# Patient Record
Sex: MALE | Race: BLACK OR AFRICAN AMERICAN | NOT HISPANIC OR LATINO | Employment: OTHER | URBAN - METROPOLITAN AREA
[De-identification: names, ages, dates, MRNs, and addresses within clinical notes are randomized per-mention and may not be internally consistent; named-entity substitution may affect disease eponyms.]

---

## 2017-01-04 ENCOUNTER — TELEPHONE (OUTPATIENT)
Dept: INTERNAL MEDICINE | Facility: CLINIC | Age: 82
End: 2017-01-04

## 2017-01-04 NOTE — TELEPHONE ENCOUNTER
----- Message from Bebeto Malcolm sent at 1/4/2017 10:41 AM CST -----  Contact: Maxwell Iglesias with [facility] [phone]  Pt is on coumadin daily but pt has not has an INR in weeks. Requesting orders for pt to receive INR by home health.   Pt also needs glucometer to check blood sugar daily. Blood sugar has not been checked in about 2 to 3 weeks.     [facility] Fax: [phone]

## 2017-01-06 RX ORDER — METOPROLOL TARTRATE 25 MG/1
TABLET, FILM COATED ORAL
Qty: 270 TABLET | Refills: 2 | Status: SHIPPED | OUTPATIENT
Start: 2017-01-06 | End: 2017-10-20 | Stop reason: SDUPTHER

## 2017-01-06 RX ORDER — GLIPIZIDE 2.5 MG/1
TABLET, EXTENDED RELEASE ORAL
Qty: 30 TABLET | Refills: 3 | Status: SHIPPED | OUTPATIENT
Start: 2017-01-06 | End: 2017-03-15

## 2017-01-12 ENCOUNTER — TELEPHONE (OUTPATIENT)
Dept: INTERNAL MEDICINE | Facility: CLINIC | Age: 82
End: 2017-01-12

## 2017-01-12 NOTE — TELEPHONE ENCOUNTER
[facility] called and states that the patient needs an order for him to do PT/INR order and he needs a glucometer and all the supplies/   Fax # [phone]  [facility] # cell [phone]

## 2017-01-18 RX ORDER — INSULIN PUMP SYRINGE, 3 ML
EACH MISCELLANEOUS
Qty: 1 EACH | Refills: 0 | Status: SHIPPED | OUTPATIENT
Start: 2017-01-18 | End: 2017-11-20 | Stop reason: SDUPTHER

## 2017-01-18 RX ORDER — LANCETS
1 EACH MISCELLANEOUS DAILY
Qty: 100 EACH | Refills: 4 | Status: SHIPPED | OUTPATIENT
Start: 2017-01-18 | End: 2018-04-03 | Stop reason: SDUPTHER

## 2017-01-18 NOTE — TELEPHONE ENCOUNTER
Called the Home health nurse [facility] @ [phone].. [facility] for a return call. Called [facility] To advise of the orders advised to fax them to [phone]    Faxed the orders to the office

## 2017-01-18 NOTE — TELEPHONE ENCOUNTER
Patient should be taking Coumadin 6 mg every evening.  Please check PT/INR every 2 weeks.  Glucometer, strips and lancets have been ordered and sent to the pharmacy electronically.

## 2017-01-20 ENCOUNTER — TELEPHONE (OUTPATIENT)
Dept: INTERNAL MEDICINE | Facility: CLINIC | Age: 82
End: 2017-01-20

## 2017-01-20 NOTE — TELEPHONE ENCOUNTER
Spoke with [facility] patient needs to continue  Home Health Care for 1x per week gave verbal order according to Dr Garcia will fax over the signed copy today.

## 2017-01-26 ENCOUNTER — TELEPHONE (OUTPATIENT)
Dept: INTERNAL MEDICINE | Facility: CLINIC | Age: 82
End: 2017-01-26

## 2017-01-26 NOTE — LETTER
January 27, 2017                 [facility]  [address] [facility]  [place] [address]  Phone: [phone]  Fax: [phone] January 27, 2017     Patient: Chris Asencio    YOB: 1931           To Whom It May Concern:    Please be advised that Chris Asencio is being cared for by his daughter, Ronal Asencio.    If you have any questions or concerns, please don't hesitate to call.    Sincerely,        Arleen Garcia MD

## 2017-01-26 NOTE — TELEPHONE ENCOUNTER
----- Message from Maria Fernanda Schneider sent at 1/26/2017  9:04 AM CST -----  Contact: Shiva daughter [phone]  Patient would like to get medical advice.  Symptoms (please be specific):  Cough Cold Congestion   How long has patient had these symptoms:  Today  Pharmacy name and phone #:  [facility] [phone]  Any drug allergies:    Comments:     Also pt  needs a refill on hydrocodone-acetaminophen 7.5-325mg and would like to get a letter stating his daughter takes care of him ,Please call

## 2017-01-27 RX ORDER — AMOXICILLIN 500 MG/1
500 TABLET, FILM COATED ORAL 2 TIMES DAILY
Qty: 20 TABLET | Refills: 0 | Status: SHIPPED | OUTPATIENT
Start: 2017-01-27 | End: 2017-03-15 | Stop reason: SDUPTHER

## 2017-01-27 RX ORDER — HYDROCODONE BITARTRATE AND ACETAMINOPHEN 7.5; 325 MG/1; MG/1
1 TABLET ORAL EVERY 6 HOURS PRN
Qty: 30 TABLET | Refills: 0 | Status: SHIPPED | OUTPATIENT
Start: 2017-02-24 | End: 2017-06-16 | Stop reason: SDUPTHER

## 2017-01-27 RX ORDER — HYDROCODONE BITARTRATE AND ACETAMINOPHEN 7.5; 325 MG/1; MG/1
1 TABLET ORAL EVERY 6 HOURS PRN
Qty: 30 TABLET | Refills: 0 | Status: SHIPPED | OUTPATIENT
Start: 2017-03-24 | End: 2017-03-10 | Stop reason: SDUPTHER

## 2017-01-27 RX ORDER — HYDROCODONE BITARTRATE AND ACETAMINOPHEN 7.5; 325 MG/1; MG/1
1 TABLET ORAL EVERY 6 HOURS PRN
Qty: 30 TABLET | Refills: 0 | Status: SHIPPED | OUTPATIENT
Start: 2017-01-27 | End: 2017-03-10 | Stop reason: SDUPTHER

## 2017-01-27 NOTE — TELEPHONE ENCOUNTER
Please inform patient that prescriptions are ready for pickup.  Prescription for amoxicillin has been sent to the pharmacy electronically.

## 2017-01-30 NOTE — TELEPHONE ENCOUNTER
Called Ronal the patients daughter @  [phone] There was no answer lmom for a return call    Called the primary # on file [phone] there was no answer lmom for a return call     Called the number listed for his daughter @  lmom for a return call

## 2017-02-10 ENCOUNTER — TELEPHONE (OUTPATIENT)
Dept: INTERNAL MEDICINE | Facility: CLINIC | Age: 82
End: 2017-02-10

## 2017-02-10 NOTE — TELEPHONE ENCOUNTER
Arleen Garcia MD        8:40 AM   Note      Patient should be taking Coumadin 6 mg every evening. Please check PT/INR every 2 weeks. Glucometer, strips and lancets have been ordered and sent to the pharmacy electronically.         January 12, 2017    Called and advised [facility] per the notes this is the order for the PT/INR [facility] ([facility])

## 2017-02-10 NOTE — TELEPHONE ENCOUNTER
----- Message from Argentina Albarran sent at 2/10/2017 10:52 AM CST -----  Contact: [facility]/ Hyacinth [phone]  When is the next PT/INR due for patient's coumadin check?

## 2017-03-07 RX ORDER — WARFARIN 1 MG/1
TABLET ORAL
Qty: 30 TABLET | Refills: 3 | Status: SHIPPED | OUTPATIENT
Start: 2017-03-07 | End: 2017-08-04 | Stop reason: SDUPTHER

## 2017-03-10 ENCOUNTER — HOSPITAL ENCOUNTER (OUTPATIENT)
Dept: RADIOLOGY | Facility: HOSPITAL | Age: 82
Discharge: HOME | End: 2017-03-10
Attending: INTERNAL MEDICINE
Payer: MEDICARE

## 2017-03-10 ENCOUNTER — HOSPITAL ENCOUNTER (EMERGENCY)
Facility: OTHER | Age: 82
Discharge: HOME | End: 2017-03-10
Attending: EMERGENCY MEDICINE
Payer: COMMERCIAL

## 2017-03-10 ENCOUNTER — OFFICE VISIT (OUTPATIENT)
Dept: INTERNAL MEDICINE | Facility: CLINIC | Age: 82
End: 2017-03-10
Payer: COMMERCIAL

## 2017-03-10 VITALS
SYSTOLIC BLOOD PRESSURE: 116 MMHG | WEIGHT: 200 LBS | BODY MASS INDEX: 27.09 KG/M2 | TEMPERATURE: 99 F | DIASTOLIC BLOOD PRESSURE: 66 MMHG | HEART RATE: 86 BPM | HEIGHT: 72 IN | OXYGEN SATURATION: 96 % | RESPIRATION RATE: 18 BRPM

## 2017-03-10 VITALS
RESPIRATION RATE: 16 BRPM | HEART RATE: 103 BPM | HEIGHT: 71 IN | BODY MASS INDEX: 30.34 KG/M2 | SYSTOLIC BLOOD PRESSURE: 149 MMHG | DIASTOLIC BLOOD PRESSURE: 86 MMHG | TEMPERATURE: 99 F | WEIGHT: 216.69 LBS
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
## 2017-03-10 DIAGNOSIS — M54.9 BACK PAIN, UNSPECIFIED BACK LOCATION, UNSPECIFIED BACK PAIN LATERALITY, UNSPECIFIED CHRONICITY: ICD-10-CM

## 2017-03-10 DIAGNOSIS — I10 ESSENTIAL HYPERTENSION: ICD-10-CM

## 2017-03-10 DIAGNOSIS — E11.8 TYPE 2 DIABETES MELLITUS WITH COMPLICATION, WITHOUT LONG-TERM CURRENT USE OF INSULIN: ICD-10-CM

## 2017-03-10 DIAGNOSIS — Z23 NEED FOR VACCINATION WITH 13-POLYVALENT PNEUMOCOCCAL CONJUGATE VACCINE: ICD-10-CM

## 2017-03-10 DIAGNOSIS — I89.0 ELEPHANTIASIS NOSTRA VERRUCOSA: ICD-10-CM

## 2017-03-10 DIAGNOSIS — M54.2 NECK PAIN: ICD-10-CM

## 2017-03-10 DIAGNOSIS — I48.20 CHRONIC ATRIAL FIBRILLATION: Primary | ICD-10-CM

## 2017-03-10 DIAGNOSIS — L85.9 HYPERKERATOSIS: ICD-10-CM

## 2017-03-10 PROCEDURE — 99999 PR PBB SHADOW E&M-EST. PATIENT-LVL IV: CPT | Mod: PBBFAC,,, | Performed by: INTERNAL MEDICINE

## 2017-03-10 PROCEDURE — 72100 X-RAY EXAM L-S SPINE 2/3 VWS: CPT | Mod: 26,,, | Performed by: RADIOLOGY

## 2017-03-10 PROCEDURE — 25000003 PHARM REV CODE 250: Performed by: EMERGENCY MEDICINE

## 2017-03-10 PROCEDURE — 99214 OFFICE O/P EST MOD 30 MIN: CPT | Mod: S$GLB,,, | Performed by: INTERNAL MEDICINE

## 2017-03-10 PROCEDURE — 72040 X-RAY EXAM NECK SPINE 2-3 VW: CPT | Mod: 26,,, | Performed by: RADIOLOGY

## 2017-03-10 PROCEDURE — 99284 EMERGENCY DEPT VISIT MOD MDM: CPT | Mod: 25

## 2017-03-10 RX ORDER — HYDROCODONE BITARTRATE AND ACETAMINOPHEN 7.5; 325 MG/1; MG/1
1 TABLET ORAL EVERY 6 HOURS PRN
Qty: 30 TABLET | Refills: 0 | Status: SHIPPED | OUTPATIENT
Start: 2017-05-19 | End: 2017-06-16 | Stop reason: SDUPTHER

## 2017-03-10 RX ORDER — ACETAMINOPHEN 325 MG/1
650 TABLET ORAL
Status: COMPLETED | OUTPATIENT
Start: 2017-03-10 | End: 2017-03-10

## 2017-03-10 RX ORDER — HYDROCODONE BITARTRATE AND ACETAMINOPHEN 7.5; 325 MG/1; MG/1
1 TABLET ORAL EVERY 6 HOURS PRN
Qty: 30 TABLET | Refills: 0 | Status: SHIPPED | OUTPATIENT
Start: 2017-04-21 | End: 2017-06-16 | Stop reason: SDUPTHER

## 2017-03-10 RX ORDER — TRIAMCINOLONE ACETONIDE 1 MG/G
CREAM TOPICAL 2 TIMES DAILY
Qty: 400 G | Refills: 2 | Status: SHIPPED | OUTPATIENT
Start: 2017-03-10 | End: 2017-10-20 | Stop reason: SDUPTHER

## 2017-03-10 RX ADMIN — ACETAMINOPHEN 650 MG: 325 TABLET ORAL at 03:03
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
## 2017-03-10 NOTE — DISCHARGE INSTRUCTIONS
Back Care Tips    Caring for your back  These are things you can do to prevent a recurrence of acute back pain and to reduce symptoms from chronic back pain:  · Maintain a healthy weight. If you are overweight, losing weight will help most types of back pain.  · Exercise is an important part of recovery from most types of back pain. The muscles behind and in front of the spine support the back. This means strengthening both the back muscles and the abdominal muscles will provide better support for your spine.   · Swimming and brisk walking are good overall exercises to improve your fitness level.  · Practice safe lifting methods (below).  · Practice good posture when sitting, standing and walking. Avoid prolonged sitting. This puts more stress on the lower back than standing or walking.  · Wear quality shoes with sufficient arch support. Foot and ankle alignment can affect back symptoms. Women should avoid wearing high heels.  · Therapeutic massage can help relax the back muscles without stretching them.  · During the first 24 to 72 hours after an acute injury or flare-up of chronic back pain, apply an ice pack to the painful area for 20 minutes and then remove it for 20 minutes, over a period of 60 to 90 minutes, or several times a day. As a safety precaution, do not use a heating pad at bedtime. Sleeping on a heating pad can lead to skin burns or tissue damage.  · You can alternate ice and heat therapies.  Medications  Talk to your healthcare provider before using medicines, especially if you have other medical problems or are taking other medicines.  · You may use acetaminophen or ibuprofen to control pain, unless your healthcare provider prescribed other pain medicine. If you have chronic conditions like diabetes, liver or kidney disease, stomach ulcers, or gastrointestinal bleeding, or are taking blood thinners, talk with your healthcare provider before taking any medicines.  · Be careful if you are given  prescription pain medicines, narcotics, or medicine for muscle spasm. They can cause drowsiness, affect your coordination, reflexes, and judgment. Do not drive or operate heavy machinery while taking these types of medicines. Take prescription pain medicine only as prescribed by your healthcare provider.  Lumbar stretch  Here is a simple stretching exercise that will help relax muscle spasm and keep your back more limber. If exercise makes your back pain worse, dont do it.  · Lie on your back with your knees bent and both feet on the ground.  · Slowly raise your left knee to your chest as you flatten your lower back against the floor. Hold for 5 seconds.  · Relax and repeat the exercise with your right knee.  · Do 10 of these exercises for each leg.  Safe lifting method  · Dont bend over at the waist to lift an object off the floor.  Instead, bend your knees and hips in a squat.   · Keep your back and head upright  · Hold the object close to your body, directly in front of you.  · Straighten your legs to lift the object.   · Lower the object to the floor in the reverse fashion.  · If you must slide something across the floor, push it.  Posture tips  Sitting  Sit in chairs with straight backs or low-back support. Keep your knees lower than your hips, with your feet flat on the floor.  When driving, sit up straight. Adjust the seat forward so you are not leaning toward the steering wheel.  A small pillow or rolled towel behind your lower back may help if you are driving long distances.   Standing  When standing for long periods, shift most of your weight to one leg at a time. Alternate legs every few minutes.   Sleeping  The best way to sleep is on your side with your knees bent. Put a low pillow under your head to support your neck in a neutral spine position. Avoid thick pillows that bend your neck to one side. Put a pillow between your legs to further relax your lower back. If you sleep on your back, put pillows  under your knees to support your legs in a slightly flexed position. Use a firm mattress. If your mattress sags, replace it, or use a 1/2-inch plywood board under the mattress to add support.  Follow-up care  Follow up with your healthcare provider, or as advised.  If X-rays, a CT scan or an MRI scan were taken, they will be reviewed by a radiologist. You will be notified of any new findings that may affect your care.  Call 911  Seek emergency medical care if any of the following occur:  · Trouble breathing  · Confusion  · Very drowsy  · Fainting or loss of consciousness  · Rapid or very slow heart rate  · Loss of  bowel or bladder control  When to seek medical care  Call your healthcare provider if any of the following occur:  · Pain becomes worse or spreads to your arms or legs  · Weakness or numbness in one or both arms or legs  · Numbness in the groin area  Date Last Reviewed: 6/1/2016  © 3735-9925 [employer]. [address] [place] [address], [place] [address]. All rights reserved. This information is not intended as a substitute for professional medical care. Always follow your healthcare professional's instructions.

## 2017-03-10 NOTE — MR AVS SNAPSHOT
[place] [facility]   [facility]  [place] [address]  Phone: [phone]  Fax: [phone]                  Chris Asencio   3/10/2017 10:00 AM   Office Visit    Description:  Male : 1931   Provider:  Arleen Garcia MD   Department:  [place] [facility]           Reason for Visit     Annual Exam           Diagnoses this Visit        Comments    Chronic atrial fibrillation    -  Primary     Hyperkeratosis         Elephantiasis nostra verrucosa         Type 2 diabetes mellitus with complication, without long-term current use of insulin         Back pain, unspecified back location, unspecified back pain laterality, unspecified chronicity         Essential hypertension         Need for vaccination with 13-polyvalent pneumococcal conjugate vaccine         Neck pain                To Do List           Goals (5 Years of Data)     None      Follow-Up and Disposition     Return in about 3 months (around 6/10/2017).       These Medications        Disp Refills Start End    triamcinolone acetonide 0.1% (KENALOG) 0.1 % cream 400 g 2 3/10/2017     Apply topically 2 (two) times daily. Apply to affected area twice daily as directed. - Topical (Top)    Pharmacy: [id number] - [address] Ph #: [phone]       hydrocodone-acetaminophen 7.5-325mg (NORCO) 7.5-325 mg per tablet 30 tablet 0 2017     Take 1 tablet by mouth every 6 (six) hours as needed for Pain. - Oral    Pharmacy: [id number] - [address] Ph #: [phone]       hydrocodone-acetaminophen 7.5-325mg (NORCO) 7.5-325 mg per tablet 30 tablet 0 2017     Take 1 tablet by mouth every 6 (six) hours as needed for Pain. - Oral    [email or web address] [address] [facility] [address] Ph #: [phone]         [facility]     [facility] Care [facility] -  Assistance  Registered nurses in  the [facility] provide clinical advisement, health education, appointment booking, and other advisory services.  Call for this free service at [phone].             Medications           Message regarding Medications     Verify the changes and/or additions to your medication regime listed below are the same as discussed with your clinician today.  If any of these changes or additions are incorrect, please notify your healthcare provider.        CHANGE how you are taking these medications     Start Taking Instead of    triamcinolone acetonide 0.1% (KENALOG) 0.1 % cream triamcinolone acetonide 0.1% (KENALOG) 0.1 % cream    Dosage:  Apply topically 2 (two) times daily. Apply to affected area twice daily as directed. Dosage:  Apply topically 2 (two) times daily. Apply to affected area twice daily as directed.    Reason for Change:  Reorder            Verify that the below list of medications is an accurate representation of the medications you are currently taking.  If none reported, the list may be blank. If incorrect, please contact your healthcare provider. Carry this list with you in case of emergency.           Current Medications     acitretin (SORIATANE) 25 MG capsule Take 1 capsule (25 mg total) by mouth once daily.    ammonium lactate (LAC-HYDRIN) 12 % lotion APPLY TOPICALLY ONCE DAILY TO BODY, ARMS, AND LEGS    ammonium lactate 12 % Crea Apply 1 application topically 2 (two) times daily. Apply to affected area    blood sugar diagnostic Strp 1 strip by Misc.(Non-Drug; Combo Route) route once daily.    blood-glucose meter kit Use as instructed    econazole nitrate 1 % cream AAA bid to feet    fluticasone (FLONASE) 50 mcg/actuation nasal spray 1 spray by Each Nare route once daily.    gentamicin (GARAMYCIN) 0.1 % ointment     glipiZIDE (GLUCOTROL) 2.5 MG TR24 take 1 tablet by mouth once daily    hydrocodone-acetaminophen 7.5-325mg (NORCO) 7.5-325 mg per tablet Take 1 tablet by mouth every 6  "(six) hours as needed for Pain.    hydrocodone-acetaminophen 7.5-325mg (NORCO) 7.5-325 mg per tablet Starting on Apr 21, 2017. Take 1 tablet by mouth every 6 (six) hours as needed for Pain.    hydrocodone-acetaminophen 7.5-325mg (NORCO) 7.5-325 mg per tablet Starting on May 19, 2017. Take 1 tablet by mouth every 6 (six) hours as needed for Pain.    lancets Misc 1 Units by Misc.(Non-Drug; Combo Route) route once daily.    metoprolol tartrate (LOPRESSOR) 25 MG tablet Take 1 tablet (25 mg total) by mouth 2 (two) times daily.    metoprolol tartrate (LOPRESSOR) 25 MG tablet take 1 tablet by mouth three times a day    metoprolol tartrate (LOPRESSOR) 25 MG tablet take 1 tablet by mouth three times a day    triamcinolone acetonide 0.1% (KENALOG) 0.1 % cream apply to affected area twice a day    triamcinolone acetonide 0.1% (KENALOG) 0.1 % cream Apply topically 2 (two) times daily. Apply to affected area twice daily as directed.    warfarin (COUMADIN) 1 MG tablet take 1 tablet by mouth once daily    warfarin (COUMADIN) 5 MG tablet take 1 tablet by mouth once daily           Clinical Reference Information           Your Vitals Were     BP Pulse Temp Resp    149/86 (BP Location: Right arm, Patient Position: Sitting, BP Method: Manual) 103 99.3 °F (37.4 °C) (Oral) 16    Height Weight BMI    5' 11" (1.803 m) 98.3 kg (216 lb 11.4 oz) 30.23 kg/m2      Blood Pressure          Most Recent Value    BP  (!)  149/86      Allergies as of 3/10/2017     No Known Allergies      Immunizations Administered on Date of Encounter - 3/10/2017     Name Date Dose VIS Date Route    Pneumococcal Conjugate - 13 Valent  Incomplete 0.5 mL 11/5/2015 Intramuscular      Orders Placed During Today's Visit      Normal Orders This Visit    Pneumococcal Conjugate Vaccine (13 Valent) (IM)     Future Labs/Procedures Expected by Expires    CBC auto differential  3/10/2017 5/9/2018    Comprehensive metabolic panel  3/10/2017 5/9/2018    Hemoglobin A1c  3/10/2017 " 3/10/2018    TSH  3/10/2017 5/9/2018    X-Ray Cervical Spine AP And Lateral  3/10/2017 3/10/2018    X-Ray Lumbar Spine Ap And Lateral  3/10/2017 3/10/2018      [facility] Sign-Up     Activating your [facility] account is as easy as 1-2-3!     1) Visit [email or web address], select Sign Up Now, enter this activation code and your date of birth, then select Next.  [email or web address]  Expires: 4/24/2017 11:00 AM      2) Create a username and password to use when you visit [facility] in the future and select a security question in case you lose your password and select Next.    3) Enter your e-mail address and click Sign Up!    Additional Information  If you have questions, please e-mail [email or web address] or call [phone] to talk to our [facility] staff. Remember, [facility] is NOT to be used for urgent needs. For medical emergencies, dial 911.         Language Assistance Services     ATTENTION: Language assistance services are available, free of charge. Please call [phone].      ATENCIÓN: Si habla español, tiene a lopez disposición servicios gratuitos de asistencia lingüística. Llame al [phone].     CHÚ Ý: N?u b?n nói Ti?ng Vi?t, có các d?ch v? h? tr? ngôn ng? mi?n phí dành cho b?n. G?i s? [phone].         [facility] complies with applicable Federal civil rights laws and does not discriminate on the basis of race, color, national origin, age, disability, or sex.

## 2017-03-10 NOTE — PROGRESS NOTES
CC: Annual review of chronic medical problems  HPI:  The patient is a 85 y.o. old male with atrial fibrillation and type 2 diabetes mellitus with complication who presents to the office for annual review of chronic medical problems.  The patient has not taken blood pressure medication yet today.    PAST MEDICAL HISTORY  Past Medical History:   Diagnosis Date    *Atrial fibrillation     Diabetes mellitus type II     Glaucoma     Hyperlipidemia     Hypertension        SURGICAL HISTORY:  Past Surgical History:   Procedure Laterality Date    CHOLECYSTECTOMY           MEDS:  Medcard reviewed and updated    ALLERGIES: Allergy Card reviewed and updated    SOCIAL HISTORY:   The patient is a nonsmoker, denies alcohol or illicit drug use.    ROS:  GENERAL: No fever, chills, fatigability or weight loss.  SKIN: Positive rash.  HEAD: No headaches or recent head trauma.  EYES: Blind.  EARS: Denies ear pain, discharge or vertigo.  NOSE: No epistaxis.  Positive postnasal drip.  MOUTH & THROAT: No hoarseness or change in voice.   NODES: Denies swollen glands.  CHEST: Positive shortness of breath occasionally.  Denies wheezing, cough and sputum production.  CARDIOVASCULAR: Denies chest pain or palpitations.  ABDOMEN: Appetite fine. Denies diarrhea, abdominal pain or constipation.  URINARY: No dysuria.  MUSCULOSKELETAL: Positive leg pain and swelling. Positive back pain.  NEUROLOGIC: No history of seizures.  ENDOCRINE: Denies polyuria or polydipsia.  PSYCHIATRIC: Denies mood swings, depression, anxiety, homicidal or suicidal thoughts.    SCREENINGS:  Last cholesterol: 2015  Last colonoscopy: several years ago  Last tetanus: unknown  Last Pneumovax: none  Last eye exam: none  Last PSA: unknown    PE:   Vitals:  Vitals:    03/10/17 0951   BP: (!) 149/86   Pulse: 103   Resp: 16   Temp: 99.3 °F (37.4 °C)       APPEARANCE: Well nourished, well developed, in no acute distress.    NECK: Pain with extension.  CHEST: Lungs clear to  auscultation with unlabored respirations.  CARDIOVASCULAR: Irregularly, irregular S1, S2. No murmurs. No carotid bruits. Bilateral pedal edema.  ABDOMEN: Bowel sounds normal. Not distended. Soft. No tenderness or masses. No organomegaly.  MUSCULOSKELETAL:  Normal gait   SKIN: Dry, scaling skin of bilateral legs.  PSYCHIATRIC: The patient is oriented to person, place, and time and has a pleasant affect.        ASSESSMENT/PLAN:  Chris was seen today for annual exam.    Diagnoses and all orders for this visit:    Chronic atrial fibrillation  -     Comprehensive metabolic panel; Future  -     TSH; Future  -     Continue Coumadin therapy  -     PT/INR; Future    Hyperkeratosis  -     triamcinolone acetonide 0.1% (KENALOG) 0.1 % cream; Apply topically 2 (two) times daily. Apply to affected area twice daily as directed.    Elephantiasis nostra verrucosa  -     triamcinolone acetonide 0.1% (KENALOG) 0.1 % cream; Apply topically 2 (two) times daily. Apply to affected area twice daily as directed.    Type 2 diabetes mellitus with complication, without long-term current use of insulin  -     Comprehensive metabolic panel; Future  -     Hemoglobin A1c; Future    Back pain, unspecified back location, unspecified back pain laterality, unspecified chronicity  -     X-Ray Lumbar Spine Ap And Lateral; Future    Essential hypertension  -     TSH; Future  -     CBC auto differential; Future  -     Blood pressure is mildly elevated, resume antihypertensive    Need for vaccination with 13-polyvalent pneumococcal conjugate vaccine  -     Pneumococcal Conjugate Vaccine (13 Valent) (IM)    Neck pain  -     X-Ray Cervical Spine AP And Lateral; Future    Other orders  -     hydrocodone-acetaminophen 7.5-325mg (NORCO) 7.5-325 mg per tablet; Take 1 tablet by mouth every 6 (six) hours as needed for Pain.  -     hydrocodone-acetaminophen 7.5-325mg (NORCO) 7.5-325 mg per tablet; Take 1 tablet by mouth every 6 (six) hours as needed for  Pain.
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
## 2017-03-15 ENCOUNTER — TELEPHONE (OUTPATIENT)
Dept: INTERNAL MEDICINE | Facility: CLINIC | Age: 82
End: 2017-03-15

## 2017-03-15 RX ORDER — AMOXICILLIN 500 MG/1
500 TABLET, FILM COATED ORAL 2 TIMES DAILY
Qty: 20 TABLET | Refills: 0 | Status: SHIPPED | OUTPATIENT
Start: 2017-03-15 | End: 2017-03-25

## 2017-03-15 NOTE — TELEPHONE ENCOUNTER
Please inform patient's daughter that we can authorize physical therapy at home.  He may need to be reevaluated if he is having significant pain.  Please advise and schedule follow-up.  Also, labs are good.  Diabetes is very well controlled.  Hemoglobin A1c is low at 4.7.  Recommend discontinuing glipizide to decrease the risk of low blood sugars.  Also, x-rays do show curvature of the spine.  This can contribute to back and neck pain.  Also, INR is normal.  Please advise if patient has been taking Coumadin as prescribed.

## 2017-03-15 NOTE — TELEPHONE ENCOUNTER
Patient's daughter  Said  She did not request the Norco  Only need  Amoxil patient has a runny nose  (clear  Mucus) and a cough
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
## 2017-03-15 NOTE — TELEPHONE ENCOUNTER
----- Message from Sheryl Oliveira MA sent at 3/15/2017 11:08 AM CDT -----  Contact: Ronal/Zvbwwyky-[phone]  Type: Rx    Name of medication(s): amoxicillin (AMOXIL) 500 MG Tab and hydrocodone-acetaminophen 7.5-325mg (NORCO) 7.5-325 mg per tablet    Is this a refill? New rx? Refill    Who prescribed medication?    Pharmacy Name, Phone, & Location: [facility] [place] - [address]    Comments: Please advise. Thanks!

## 2017-03-16 NOTE — TELEPHONE ENCOUNTER
daughter  Is concern about patient leg their is some swelling bur she think it because he sitting  up .

## 2017-03-16 NOTE — TELEPHONE ENCOUNTER
Advise patient to keep his legs elevated when seated.  Please advise if patient is experiencing shortness of breath.

## 2017-03-17 NOTE — TELEPHONE ENCOUNTER
Spoke to patient daughter and gave her  Doctor instruction .  Patient is not experience any shortness  Of breath

## 2017-03-23 ENCOUNTER — TELEPHONE (OUTPATIENT)
Dept: INTERNAL MEDICINE | Facility: CLINIC | Age: 82
End: 2017-03-23

## 2017-03-23 NOTE — TELEPHONE ENCOUNTER
----- Message from Katherin Christianson sent at 3/23/2017 10:07 AM CDT -----  Contact: [facility] [phone]  Will like orders to continue home health care services for the pt faxed to [phone],please advise

## 2017-05-08 RX ORDER — WARFARIN SODIUM 5 MG/1
TABLET ORAL
Qty: 30 TABLET | Refills: 3 | Status: SHIPPED | OUTPATIENT
Start: 2017-05-08 | End: 2017-09-03 | Stop reason: SDUPTHER

## 2017-06-05 RX ORDER — GLIPIZIDE 2.5 MG/1
TABLET, EXTENDED RELEASE ORAL
Qty: 30 TABLET | Refills: 3 | Status: SHIPPED | OUTPATIENT
Start: 2017-06-05 | End: 2017-10-03 | Stop reason: SDUPTHER

## 2017-06-15 ENCOUNTER — TELEPHONE (OUTPATIENT)
Dept: INTERNAL MEDICINE | Facility: CLINIC | Age: 82
End: 2017-06-15

## 2017-06-15 NOTE — TELEPHONE ENCOUNTER
----- Message from Taylor Bass sent at 6/15/2017  1:28 PM CDT -----  Contact: Ronal, phone [phone]  Ronal says Mr. Asencio will not have a ride until after 10:30 tomorrow morning. He is scheduled for 9:40.  She would like to know if you can get him in later tomorrow.  Please give her a call.    Thanks!

## 2017-06-16 ENCOUNTER — OFFICE VISIT (OUTPATIENT)
Dept: INTERNAL MEDICINE | Facility: CLINIC | Age: 82
End: 2017-06-16
Payer: MEDICARE

## 2017-06-16 VITALS
DIASTOLIC BLOOD PRESSURE: 60 MMHG | TEMPERATURE: 99 F | HEIGHT: 72 IN | HEART RATE: 73 BPM | WEIGHT: 199.94 LBS | BODY MASS INDEX: 27.08 KG/M2 | SYSTOLIC BLOOD PRESSURE: 142 MMHG

## 2017-06-16 DIAGNOSIS — I89.0 ELEPHANTIASIS NOSTRA VERRUCOSA: ICD-10-CM

## 2017-06-16 DIAGNOSIS — L85.9 HYPERKERATOSIS: ICD-10-CM

## 2017-06-16 DIAGNOSIS — E11.8 TYPE 2 DIABETES MELLITUS WITH COMPLICATION, WITHOUT LONG-TERM CURRENT USE OF INSULIN: Primary | ICD-10-CM

## 2017-06-16 PROCEDURE — 99999 PR PBB SHADOW E&M-EST. PATIENT-LVL III: CPT | Mod: PBBFAC,,, | Performed by: INTERNAL MEDICINE

## 2017-06-16 PROCEDURE — 99213 OFFICE O/P EST LOW 20 MIN: CPT | Mod: PBBFAC,PO | Performed by: INTERNAL MEDICINE

## 2017-06-16 PROCEDURE — 99213 OFFICE O/P EST LOW 20 MIN: CPT | Mod: S$PBB,,, | Performed by: INTERNAL MEDICINE

## 2017-06-16 PROCEDURE — 1159F MED LIST DOCD IN RCRD: CPT | Mod: ,,, | Performed by: INTERNAL MEDICINE

## 2017-06-16 RX ORDER — TRIAMCINOLONE ACETONIDE 1 MG/G
CREAM TOPICAL
Qty: 454 G | Refills: 3 | Status: SHIPPED | OUTPATIENT
Start: 2017-06-16 | End: 2017-10-04 | Stop reason: SDUPTHER

## 2017-06-16 RX ORDER — HYDROCODONE BITARTRATE AND ACETAMINOPHEN 7.5; 325 MG/1; MG/1
1 TABLET ORAL EVERY 6 HOURS PRN
Qty: 30 TABLET | Refills: 0 | Status: SHIPPED | OUTPATIENT
Start: 2017-08-11 | End: 2017-09-28 | Stop reason: SDUPTHER

## 2017-06-16 RX ORDER — HYDROCODONE BITARTRATE AND ACETAMINOPHEN 7.5; 325 MG/1; MG/1
1 TABLET ORAL EVERY 6 HOURS PRN
Qty: 30 TABLET | Refills: 0 | Status: SHIPPED | OUTPATIENT
Start: 2017-06-16 | End: 2017-10-20 | Stop reason: SDUPTHER

## 2017-06-16 RX ORDER — HYDROCODONE BITARTRATE AND ACETAMINOPHEN 7.5; 325 MG/1; MG/1
1 TABLET ORAL EVERY 6 HOURS PRN
Qty: 30 TABLET | Refills: 0 | Status: SHIPPED | OUTPATIENT
Start: 2017-07-14 | End: 2017-10-20 | Stop reason: SDUPTHER

## 2017-06-16 NOTE — PROGRESS NOTES
CC: followup of dermatitis  HPI:  The patient is a 86 y.o. year old male who presents to the office for followup of dermatitis.  His daughter reports worsening of dermatitis of leg.  The patient denies any chest pain, shortness of breath, headache, excessive fatigue, nausea or vomiting.  He complains of stiffness.    PAST MEDICAL HISTORY:  Past Medical History:   Diagnosis Date    *Atrial fibrillation     Diabetes mellitus type II     Glaucoma     Hyperlipidemia     Hypertension     Kyphosis        SURGICAL HISTORY:  Past Surgical History:   Procedure Laterality Date    CHOLECYSTECTOMY         MEDS:  Medcard reviewed and updated    ALLERGIES: Allergy Card reviewed and updated    SOCIAL HISTORY:   The patient is a nonsmoker.    PE:   APPEARANCE: Well nourished, well developed, in no acute distress.    CHEST: Lungs clear to auscultation with unlabored respirations.  CARDIOVASCULAR: Irregularly irregular S1, S2. No murmurs. No carotid bruits.  ABDOMEN: Bowel sounds normal. Not distended. Soft. No tenderness or masses.   SKIN: Positive hyperpigmentation of bilateral lower extremities, left greater than right.  Left leg with areas of crusted drainage.  PSYCHIATRIC: The patient is oriented to person, place, and time and has a pleasant affect.        ASSESSMENT/PLAN:  Chris was seen today for follow-up.    Diagnoses and all orders for this visit:    Type 2 diabetes mellitus with complication, without long-term current use of insulin  -     Good glycemic control    Elephantiasis nostra verrucosa  -     triamcinolone acetonide 0.1% (KENALOG) 0.1 % cream; apply to affected area twice a day    Hyperkeratosis    Other orders  -     hydrocodone-acetaminophen 7.5-325mg (NORCO) 7.5-325 mg per tablet; Take 1 tablet by mouth every 6 (six) hours as needed for Pain.  -     hydrocodone-acetaminophen 7.5-325mg (NORCO) 7.5-325 mg per tablet; Take 1 tablet by mouth every 6 (six) hours as needed for Pain.  -      hydrocodone-acetaminophen 7.5-325mg (NORCO) 7.5-325 mg per tablet; Take 1 tablet by mouth every 6 (six) hours as needed for Pain.

## 2017-08-04 RX ORDER — WARFARIN 1 MG/1
TABLET ORAL
Qty: 30 TABLET | Refills: 3 | Status: SHIPPED | OUTPATIENT
Start: 2017-08-04 | End: 2018-01-25 | Stop reason: SDUPTHER

## 2017-09-04 RX ORDER — WARFARIN SODIUM 5 MG/1
TABLET ORAL
Qty: 30 TABLET | Refills: 3 | Status: SHIPPED | OUTPATIENT
Start: 2017-09-04 | End: 2018-03-05 | Stop reason: SDUPTHER

## 2017-09-13 ENCOUNTER — TELEPHONE (OUTPATIENT)
Dept: INTERNAL MEDICINE | Facility: CLINIC | Age: 82
End: 2017-09-13

## 2017-09-13 NOTE — TELEPHONE ENCOUNTER
----- Message from Jerri Velasquez sent at 9/12/2017  1:59 PM CDT -----  Contact: [facility] Hyacinth [phone] ext [phone]  Hyacinth with [facility] would like a call back from the nurse/ regarding his coumidin. They state they were unable to get in to the apartment and would like to know if Dr Garcia would like them to try again to get to the pt.

## 2017-09-13 NOTE — TELEPHONE ENCOUNTER
They would like to get and order for pt inr for this week because the missed last week  Need to also get a order for and aid because patient is not getting bath  And possibles social work to eval his current living condition

## 2017-09-20 ENCOUNTER — TELEPHONE (OUTPATIENT)
Dept: INTERNAL MEDICINE | Facility: CLINIC | Age: 82
End: 2017-09-20

## 2017-09-20 NOTE — TELEPHONE ENCOUNTER
----- Message from Jerri Velasquez sent at 9/20/2017  3:58 PM CDT -----  Contact: [facility] Rafia [phone]  Rafia states she was supposed to collect PT INR but there was no answer so she will try and collect that tomorrow 9/21

## 2017-09-22 ENCOUNTER — HOSPITAL ENCOUNTER (EMERGENCY)
Facility: HOSPITAL | Age: 82
Discharge: HOME | End: 2017-09-22
Attending: EMERGENCY MEDICINE
Payer: MEDICARE

## 2017-09-22 ENCOUNTER — TELEPHONE (OUTPATIENT)
Dept: INTERNAL MEDICINE | Facility: CLINIC | Age: 82
End: 2017-09-22

## 2017-09-22 VITALS
DIASTOLIC BLOOD PRESSURE: 72 MMHG | HEIGHT: 73 IN | WEIGHT: 200 LBS | HEART RATE: 94 BPM | TEMPERATURE: 99 F | RESPIRATION RATE: 20 BRPM | OXYGEN SATURATION: 98 % | BODY MASS INDEX: 26.51 KG/M2 | SYSTOLIC BLOOD PRESSURE: 148 MMHG
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
## 2017-09-22 DIAGNOSIS — Z04.3: ICD-10-CM
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
## 2017-09-22 LAB
ALBUMIN SERPL BCP-MCNC: 2.5 G/DL
ALP SERPL-CCNC: 90 U/L
ALT SERPL W/O P-5'-P-CCNC: 13 U/L
ANION GAP SERPL CALC-SCNC: 9 MMOL/L
AST SERPL-CCNC: 28 U/L
BASOPHILS # BLD AUTO: 0.03 K/UL
BASOPHILS NFR BLD: 0.5 %
BILIRUB SERPL-MCNC: 1.6 MG/DL
BUN SERPL-MCNC: 14 MG/DL
CALCIUM SERPL-MCNC: 7.9 MG/DL
CHLORIDE SERPL-SCNC: 110 MMOL/L
CO2 SERPL-SCNC: 22 MMOL/L
CREAT SERPL-MCNC: 0.8 MG/DL
DIFFERENTIAL METHOD: ABNORMAL
EOSINOPHIL # BLD AUTO: 0 K/UL
EOSINOPHIL NFR BLD: 0.6 %
ERYTHROCYTE [DISTWIDTH] IN BLOOD BY AUTOMATED COUNT: 14.9 %
EST. GFR  (AFRICAN AMERICAN): >60 ML/MIN/1.73 M^2
EST. GFR  (NON AFRICAN AMERICAN): >60 ML/MIN/1.73 M^2
GLUCOSE SERPL-MCNC: 93 MG/DL
HCT VFR BLD AUTO: 31 %
HGB BLD-MCNC: 10.9 G/DL
LYMPHOCYTES # BLD AUTO: 1.1 K/UL
LYMPHOCYTES NFR BLD: 17.6 %
MCH RBC QN AUTO: 26.7 PG
MCHC RBC AUTO-ENTMCNC: 35.2 G/DL
MCV RBC AUTO: 76 FL
MONOCYTES # BLD AUTO: 0.6 K/UL
MONOCYTES NFR BLD: 8.8 %
NEUTROPHILS # BLD AUTO: 4.7 K/UL
NEUTROPHILS NFR BLD: 72 %
PLATELET # BLD AUTO: 128 K/UL
PMV BLD AUTO: 10 FL
POTASSIUM SERPL-SCNC: 3.6 MMOL/L
PROT SERPL-MCNC: 6.9 G/DL
RBC # BLD AUTO: 4.09 M/UL
SODIUM SERPL-SCNC: 141 MMOL/L
WBC # BLD AUTO: 6.48 K/UL

## 2017-09-22 PROCEDURE — 93010 ELECTROCARDIOGRAM REPORT: CPT | Mod: ,,, | Performed by: INTERNAL MEDICINE

## 2017-09-22 PROCEDURE — 85025 COMPLETE CBC W/AUTO DIFF WBC: CPT

## 2017-09-22 PROCEDURE — 80053 COMPREHEN METABOLIC PANEL: CPT

## 2017-09-22 PROCEDURE — 93005 ELECTROCARDIOGRAM TRACING: CPT

## 2017-09-22 PROCEDURE — 99283 EMERGENCY DEPT VISIT LOW MDM: CPT | Mod: ,,, | Performed by: EMERGENCY MEDICINE

## 2017-09-22 PROCEDURE — 99285 EMERGENCY DEPT VISIT HI MDM: CPT | Mod: 25

## 2017-09-22 RX ORDER — MELOXICAM 15 MG/1
7.5 TABLET ORAL DAILY
Qty: 20 TABLET | Refills: 0 | Status: SHIPPED | OUTPATIENT
Start: 2017-09-22 | End: 2018-08-18

## 2017-09-23 NOTE — ED NOTES
Supplies and education for urine specimen collection provided to patient. Patient verbalized understanding of procedure and agrees to call if assistance is needed.  Patient ambulatory to restroom with caregiver. Pt agrees to call for assistance if needed.  Safety Check  Bed locked and low, call bell within reach, side rails up X 2. Necessary monitoring equipment remains properly attached. Belongings remain within patients reach. Patient denies any needs at this time, advised and agrees to call with any needs or changes.

## 2017-09-23 NOTE — ED PROVIDER NOTES
"Encounter Date: 9/22/2017    SCRIBE #1 NOTE: I, Aric Haywood, am scribing for, and in the presence of,  Dr. Mcclendon. I have scribed the following portions of the note - the EKG reading and the Resident attestation.       History     Chief Complaint   Patient presents with    Fall     Pt with pain across the chest after falling asleep while sitting in bed and falling off his bed; denies hitting head.  Pt received ASA per EMS.     Pt is a 87 yo M with PMHx of Afib, Dm2, Glaucoma, HTN, HLD who presents to the ED s/p fall. Pt was at home sitting on the edge of the bed with his feet on the floor when he "dozed off" and fell off the bed landing on his side. Pts daughter who wss in the room during the interview states the the pt did not hit his head and did not have LOC. She states that the pt is behaving and acting appropriately. Pt endorses pain with movement that radiates across his chest but not presents as he rest. Pt does not have any pain at this time. He denies any Cp, SOB, N/V/D, back pain, weakness, dizziness, or adbominal pain.          Review of patient's allergies indicates:  No Known Allergies  Past Medical History:   Diagnosis Date    *Atrial fibrillation     Diabetes mellitus type II     Glaucoma     Hyperlipidemia     Hypertension     Kyphosis      Past Surgical History:   Procedure Laterality Date    CHOLECYSTECTOMY       Family History   Problem Relation Age of Onset    Melanoma Neg Hx      Social History   Substance Use Topics    Smoking status: Former Smoker     Start date: 4/16/1984    Smokeless tobacco: Former User    Alcohol use No     Review of Systems   Constitutional: Negative for fever.   HENT: Negative for congestion.    Eyes: Negative for visual disturbance.   Respiratory: Negative for shortness of breath.    Cardiovascular: Negative for chest pain.   Gastrointestinal: Negative for abdominal pain.   Endocrine: Negative for polyuria.   Genitourinary: Negative for dysuria. "   Musculoskeletal: Negative for back pain.   Skin: Negative for wound.   Neurological: Negative for weakness.       Physical Exam     Initial Vitals [09/22/17 1609]   BP Pulse Resp Temp SpO2   (!) 110/59 66 16 97.7 °F (36.5 °C) 98 %      MAP       76         Physical Exam    Nursing note and vitals reviewed.  Constitutional: He appears well-developed and well-nourished. No distress.   HENT:   Head: Normocephalic and atraumatic.   Eyes:   Pt is blind in both eyes   Neck: Normal range of motion. Neck supple. No JVD present.   No midline neck tenderness on palpiation   Cardiovascular: Normal rate and normal heart sounds.   No murmur heard.  A. Fib    Pulmonary/Chest: Breath sounds normal. No stridor. No respiratory distress. He has no wheezes. He has no rales.   Abdominal: Soft. Bowel sounds are normal. He exhibits no distension. There is no tenderness.   Musculoskeletal: Normal range of motion. He exhibits no edema or tenderness.   Neurological: He is alert. He has normal strength.   AAOx2 to person, place, but not day of the week   Skin: Skin is warm and dry. Capillary refill takes less than 2 seconds.   Hardened and dry flaky skin 2/2 to diabatic skin changes.         ED Course   Procedures  Labs Reviewed   CBC W/ AUTO DIFFERENTIAL - Abnormal; Notable for the following:        Result Value    RBC 4.09 (*)     Hemoglobin 10.9 (*)     Hematocrit 31.0 (*)     MCV 76 (*)     MCH 26.7 (*)     RDW 14.9 (*)     Platelets 128 (*)     Lymph% 17.6 (*)     All other components within normal limits   COMPREHENSIVE METABOLIC PANEL - Abnormal; Notable for the following:     CO2 22 (*)     Calcium 7.9 (*)     Albumin 2.5 (*)     Total Bilirubin 1.6 (*)     All other components within normal limits     EKG Readings: (Independently Interpreted)   Rhythm: Atrial Fibrillation. Heart Rate: 72. ST Segments: Normal ST Segments. T Waves: Normal.          Medical Decision Making:   History:   Old Medical Records: I decided to obtain old  medical records.  Initial Assessment:   Pt is a 87 yo M with PMHx of Afib, Dm2, Glaucoma, HTN, HLD who presents to the ED s/p fall. Pt was seen and evaluated by me. Pt according to daughter was mentating appropriately after the incident and is at his baseline here in the ED. In lieu of pt being on coumadin will obtain Head CT to rule out any intracranial bleeds along with CBC, CMP, and chest xray for possible rib fracture. Pt does not have c spine tenderness so I don't not believe cspine films are indicated. Pt endorsing only MSK chest complaint. I anticipate this pt will be discharged with treatment of pain pending negative labs and studies.     Reynold Bonner M.D.  [facility]  PGY-1     Pt workup was negative for any signs of acute intracranial bleeds. Patients chest Xray was also negative for any acute rib fractures but will discharge with recs on performing pulmonary toilet and encourage repetitive use of incentive spirometer w/ pain control meds PRN in the event rib fracture was not seen on xray during this admission. Pt is clinically stable for discharge and I have reviewed return precautions with the pt and his family.    Reynold Bonner M.D.  [facility]  PGY-1     Independently Interpreted Test(s):   I have ordered and independently interpreted EKG Reading(s) - see prior notes  Clinical Tests:   Lab Tests: Ordered and Reviewed  Radiological Study: Ordered and Reviewed  Medical Tests: Ordered and Reviewed            Scribe Attestation:   Scribe #1: I performed the above scribed service and the documentation accurately describes the services I performed. I attest to the accuracy of the note.    Attending Attestation:   Physician Attestation Statement for Resident:  As the supervising MD   Physician Attestation Statement: I have personally seen and examined this patient.   I agree with the above history. -: 86 year old male with history of atrial fibrillation on coumadin, DM, HTN, and HLD,  presents with chief compliant of a fall. Patient was sitting on the edge of bed, resting, when he fell asleep and fell onto the floor. Fall was witnessed by family. No LOC and did not hit his head. Patient endorses some right CP since the fall, described as a spasm. He has been compliant with medications. No fevers or recent illness. He is acting at his baseline per family.    On exam, his head is normocephalic and atraumatic.No midline tenderness to palpation of cervical spine. No tenderness to palpation throughout chest. Lungs clear. Normal heart sounds. Belly soft, non-tender. Pelvis stable. No tenderness to palpation throughout extremities.    Differential includes but is not limited to syncope, seizure, infectious issues, electrolyte abnormalities, intracranial bleed, rib fracture, and pneumothorax.As patient did not lose consciousness and did not hit his head, this is inconsistent with syncope, however CT head was obtained as patient is on coumadin. Will obtain imaging and labs and monitor in the Ed.    Reassessment: CT head negative for any acute intracranial process.  Chest x-ray does not reveal any acutely displaced fractures.  Throughout observation in the emergency department, patient remains resting at baseline according to family.  Patient's family informed that no acute fractures identified at this time however he will benefit from intensive spirometry at home.  Extensively counseled indications to return.  Close follow up with PCP.     As the supervising MD I agree with the above PE.    As the supervising MD I agree with the above treatment, course, plan, and disposition.          Physician Attestation for Scribe:  Physician Attestation Statement for Scribe #1: I, Dr. Mcclendon, reviewed documentation, as scribed by Aric Haywood in my presence, and it is both accurate and complete.                 ED Course      Clinical Impression:   The encounter diagnosis was Fall.                           Sohail  KAYLIN Mcclendon MD  09/22/17 6501

## 2017-09-23 NOTE — ED NOTES
Pt provided with IS device. Patient and caregiver provided education, both verbalized understanding

## 2017-09-26 ENCOUNTER — TELEPHONE (OUTPATIENT)
Dept: INTERNAL MEDICINE | Facility: CLINIC | Age: 82
End: 2017-09-26

## 2017-09-26 NOTE — TELEPHONE ENCOUNTER
Spoke with the patient daughter and she states she needs new orders and they have been forwarded to the PCP

## 2017-09-26 NOTE — TELEPHONE ENCOUNTER
----- Message from Maria Fernanda Schneider sent at 9/22/2017 11:12 AM CDT -----  Contact: Hyacinth [facility] [phone] ext [phone]  Hyacinth states when they went out to visit the pt for labs and home health no one was home she is calling to extend the pts Recertification,please call

## 2017-09-27 ENCOUNTER — TELEPHONE (OUTPATIENT)
Dept: INTERNAL MEDICINE | Facility: CLINIC | Age: 82
End: 2017-09-27

## 2017-09-27 DIAGNOSIS — S01.80XA OPEN WOUND OF CHIN, INITIAL ENCOUNTER: Primary | ICD-10-CM

## 2017-09-27 NOTE — TELEPHONE ENCOUNTER
----- Message from Jamey Smith sent at 9/26/2017  4:24 PM CDT -----  Contact: Rafia with [facility]   Pt has a left chin wound from a fall, needs wound care order    Please advise

## 2017-09-28 RX ORDER — HYDROCODONE BITARTRATE AND ACETAMINOPHEN 7.5; 325 MG/1; MG/1
1 TABLET ORAL EVERY 6 HOURS PRN
Qty: 30 TABLET | Refills: 0 | Status: SHIPPED | OUTPATIENT
Start: 2017-09-28 | End: 2017-10-20 | Stop reason: SDUPTHER

## 2017-09-28 NOTE — TELEPHONE ENCOUNTER
Called and spoke with Rafia and advised of the requested orders she understood and termed the call

## 2017-10-03 RX ORDER — GLIPIZIDE 2.5 MG/1
TABLET, EXTENDED RELEASE ORAL
Qty: 30 TABLET | Refills: 3 | Status: SHIPPED | OUTPATIENT
Start: 2017-10-03 | End: 2018-01-25 | Stop reason: SDUPTHER

## 2017-10-04 ENCOUNTER — TELEPHONE (OUTPATIENT)
Dept: INTERNAL MEDICINE | Facility: CLINIC | Age: 82
End: 2017-10-04

## 2017-10-04 DIAGNOSIS — I89.0 ELEPHANTIASIS NOSTRA VERRUCOSA: ICD-10-CM

## 2017-10-04 RX ORDER — AMOXICILLIN 500 MG/1
500 TABLET, FILM COATED ORAL EVERY 12 HOURS
Qty: 20 TABLET | Refills: 0 | Status: SHIPPED | OUTPATIENT
Start: 2017-10-04 | End: 2017-10-14

## 2017-10-04 RX ORDER — TRIAMCINOLONE ACETONIDE 1 MG/G
CREAM TOPICAL
Qty: 454 G | Refills: 3 | Status: SHIPPED | OUTPATIENT
Start: 2017-10-04 | End: 2017-12-21 | Stop reason: SDUPTHER

## 2017-10-11 ENCOUNTER — TELEPHONE (OUTPATIENT)
Dept: INTERNAL MEDICINE | Facility: CLINIC | Age: 82
End: 2017-10-11

## 2017-10-11 NOTE — TELEPHONE ENCOUNTER
----- Message from Soraya Butler sent at 10/10/2017  2:24 PM CDT -----  Contact: Brook/ CARRIE/ [phone]   Johnathan is calling to have the last office notes fax over. The fax number [phone]. Please call and advise     Thank you

## 2017-10-19 ENCOUNTER — TELEPHONE (OUTPATIENT)
Dept: INTERNAL MEDICINE | Facility: CLINIC | Age: 82
End: 2017-10-19

## 2017-10-19 NOTE — TELEPHONE ENCOUNTER
----- Message from Jerri Velasquez sent at 10/19/2017  4:04 PM CDT -----  Contact: [facility] Rafia [phone]  Rafia would like wound care order. Pt has open wounds on both of his feet on his toes.

## 2017-10-20 ENCOUNTER — HOSPITAL ENCOUNTER (OUTPATIENT)
Dept: RADIOLOGY | Facility: HOSPITAL | Age: 82
Discharge: HOME | End: 2017-10-20
Attending: INTERNAL MEDICINE
Payer: MEDICARE

## 2017-10-20 ENCOUNTER — OFFICE VISIT (OUTPATIENT)
Dept: INTERNAL MEDICINE | Facility: CLINIC | Age: 82
End: 2017-10-20
Payer: MEDICARE

## 2017-10-20 VITALS
HEART RATE: 79 BPM | HEIGHT: 69 IN | TEMPERATURE: 98 F | RESPIRATION RATE: 20 BRPM | DIASTOLIC BLOOD PRESSURE: 60 MMHG | SYSTOLIC BLOOD PRESSURE: 100 MMHG

## 2017-10-20 DIAGNOSIS — M25.511 ACUTE PAIN OF RIGHT SHOULDER: ICD-10-CM

## 2017-10-20 DIAGNOSIS — I10 ESSENTIAL HYPERTENSION: Primary | ICD-10-CM

## 2017-10-20 DIAGNOSIS — R07.9 CHEST PAIN, UNSPECIFIED TYPE: ICD-10-CM

## 2017-10-20 DIAGNOSIS — E11.8 TYPE 2 DIABETES MELLITUS WITH COMPLICATION, WITHOUT LONG-TERM CURRENT USE OF INSULIN: ICD-10-CM

## 2017-10-20 DIAGNOSIS — R39.89 URINE DISCOLORATION: ICD-10-CM

## 2017-10-20 DIAGNOSIS — I89.0 ELEPHANTIASIS NOSTRA VERRUCOSA: ICD-10-CM

## 2017-10-20 PROCEDURE — 71020 XR CHEST PA AND LATERAL: CPT | Mod: 26,,, | Performed by: RADIOLOGY

## 2017-10-20 PROCEDURE — 73030 X-RAY EXAM OF SHOULDER: CPT | Mod: 26,RT,, | Performed by: RADIOLOGY

## 2017-10-20 PROCEDURE — 99214 OFFICE O/P EST MOD 30 MIN: CPT | Mod: S$PBB,,, | Performed by: INTERNAL MEDICINE

## 2017-10-20 PROCEDURE — 71020 XR CHEST PA AND LATERAL: CPT | Mod: TC,PO

## 2017-10-20 PROCEDURE — 73030 X-RAY EXAM OF SHOULDER: CPT | Mod: TC,PO,RT

## 2017-10-20 PROCEDURE — 99999 PR PBB SHADOW E&M-EST. PATIENT-LVL V: CPT | Mod: PBBFAC,,, | Performed by: INTERNAL MEDICINE

## 2017-10-20 PROCEDURE — 99215 OFFICE O/P EST HI 40 MIN: CPT | Mod: PBBFAC,25,PO | Performed by: INTERNAL MEDICINE

## 2017-10-20 RX ORDER — HYDROCODONE BITARTRATE AND ACETAMINOPHEN 7.5; 325 MG/1; MG/1
1 TABLET ORAL EVERY 6 HOURS PRN
Qty: 30 TABLET | Refills: 0 | Status: SHIPPED | OUTPATIENT
Start: 2017-10-20 | End: 2018-02-26 | Stop reason: SDUPTHER

## 2017-10-20 RX ORDER — AMOXICILLIN 500 MG/1
500 CAPSULE ORAL EVERY 12 HOURS
Refills: 0 | COMMUNITY
Start: 2017-10-04 | End: 2018-03-27 | Stop reason: SDUPTHER

## 2017-10-20 RX ORDER — HYDROCODONE BITARTRATE AND ACETAMINOPHEN 7.5; 325 MG/1; MG/1
1 TABLET ORAL EVERY 6 HOURS PRN
Qty: 30 TABLET | Refills: 0 | Status: SHIPPED | OUTPATIENT
Start: 2017-12-15 | End: 2017-12-20 | Stop reason: SDUPTHER

## 2017-10-20 RX ORDER — HYDROCODONE BITARTRATE AND ACETAMINOPHEN 7.5; 325 MG/1; MG/1
1 TABLET ORAL EVERY 6 HOURS PRN
Qty: 30 TABLET | Refills: 0 | Status: SHIPPED | OUTPATIENT
Start: 2017-11-17 | End: 2018-02-26 | Stop reason: SDUPTHER

## 2017-10-20 NOTE — PROGRESS NOTES
CC: followup of hypertension and diabetes  HPI:  The patient is a 86 y.o. year old male who presents to the office for followup of hypertension and diabetes.  The patient denies any chest pain, shortness of breath, headache, excessive fatigue, nausea or vomiting.  His daughter reports he suffered a fall on two occasions recently.  The last episode, he fell out of the bed.  His daughter states he does not want to eat or drink.  The patient complains of right shoulder pain that extends across his back and chest.  His daughter reports his urine has been concentrated.    PAST MEDICAL HISTORY:  Past Medical History:   Diagnosis Date    *Atrial fibrillation     Diabetes mellitus type II     Glaucoma     Hyperlipidemia     Hypertension     Kyphosis        SURGICAL HISTORY:  Past Surgical History:   Procedure Laterality Date    CHOLECYSTECTOMY         MEDS:  Medcard reviewed and updated    ALLERGIES: Allergy Card reviewed and updated    SOCIAL HISTORY:   The patient is a nonsmoker.    PE:   APPEARANCE: Well nourished, well developed, in no acute distress.    CHEST: Lungs clear to auscultation with unlabored respirations.  CARDIOVASCULAR: Normal S1, S2. No murmurs. No carotid bruits. No pedal edema.  ABDOMEN: Bowel sounds normal. Not distended. Soft. No tenderness or masses.   PSYCHIATRIC: The patient is oriented to person, place, and time and has a pleasant affect.        ASSESSMENT/PLAN:  Chris was seen today for follow-up.    Diagnoses and all orders for this visit:    Essential hypertension  -     CBC auto differential; Future  -     Comprehensive metabolic panel; Future  -     TSH; Future  -      Blood pressure is controlled    Type 2 diabetes mellitus with complication, without long-term current use of insulin  -     Comprehensive metabolic panel; Future  -     Hemoglobin A1c; Future    Urine discoloration  -     Urinalysis; Future  -     Urine culture; Future    Acute pain of right shoulder  -     X-ray  Shoulder 2 or More Views Right; Future    Chest pain, unspecified type  -     X-Ray Chest PA And Lateral; Future    Elephantiasis nostra verrucosa  -     Ambulatory Referral to Wound Clinic    Other orders  -     hydrocodone-acetaminophen 7.5-325mg (NORCO) 7.5-325 mg per tablet; Take 1 tablet by mouth every 6 (six) hours as needed for Pain.  -     hydrocodone-acetaminophen 7.5-325mg (NORCO) 7.5-325 mg per tablet; Take 1 tablet by mouth every 6 (six) hours as needed for Pain.  -     hydrocodone-acetaminophen 7.5-325mg (NORCO) 7.5-325 mg per tablet; Take 1 tablet by mouth every 6 (six) hours as needed for Pain.

## 2017-10-24 ENCOUNTER — OUTPATIENT CASE MANAGEMENT (OUTPATIENT)
Dept: ADMINISTRATIVE | Facility: OTHER | Age: 82
End: 2017-10-24

## 2017-10-24 NOTE — PROGRESS NOTES
The following patient has been assigned to Luzma Birmingham RN with [facility] for high risk screening.    Reason: High Risk    Please contact [facility] at [phone] with any questions.    Thank you,  Tahira Wallace

## 2017-11-02 ENCOUNTER — OFFICE VISIT (OUTPATIENT)
Dept: WOUND CARE | Facility: CLINIC | Age: 82
End: 2017-11-02
Payer: MEDICARE

## 2017-11-02 ENCOUNTER — TELEPHONE (OUTPATIENT)
Dept: DERMATOLOGY | Facility: CLINIC | Age: 82
End: 2017-11-02

## 2017-11-02 VITALS
DIASTOLIC BLOOD PRESSURE: 50 MMHG | WEIGHT: 169.19 LBS | SYSTOLIC BLOOD PRESSURE: 87 MMHG | HEIGHT: 69 IN | BODY MASS INDEX: 25.06 KG/M2 | TEMPERATURE: 97 F | HEART RATE: 72 BPM

## 2017-11-02 DIAGNOSIS — E11.8 TYPE 2 DIABETES MELLITUS WITH COMPLICATION, WITHOUT LONG-TERM CURRENT USE OF INSULIN: ICD-10-CM

## 2017-11-02 DIAGNOSIS — B35.3 TINEA PEDIS OF BOTH FEET: ICD-10-CM

## 2017-11-02 DIAGNOSIS — L60.3 DYSTROPHIC NAIL: ICD-10-CM

## 2017-11-02 DIAGNOSIS — L85.9 HYPERKERATOSIS: Primary | ICD-10-CM

## 2017-11-02 PROCEDURE — 99215 OFFICE O/P EST HI 40 MIN: CPT | Mod: PBBFAC | Performed by: NURSE PRACTITIONER

## 2017-11-02 PROCEDURE — 99212 OFFICE O/P EST SF 10 MIN: CPT | Mod: S$PBB,,, | Performed by: NURSE PRACTITIONER

## 2017-11-02 PROCEDURE — 99999 PR PBB SHADOW E&M-EST. PATIENT-LVL V: CPT | Mod: PBBFAC,,, | Performed by: NURSE PRACTITIONER

## 2017-11-02 RX ORDER — ECONAZOLE NITRATE 10 MG/G
CREAM TOPICAL
Qty: 85 G | Refills: 2 | Status: SHIPPED | OUTPATIENT
Start: 2017-11-02 | End: 2018-05-18 | Stop reason: SDUPTHER

## 2017-11-02 RX ORDER — METOPROLOL TARTRATE 25 MG/1
TABLET, FILM COATED ORAL
Qty: 270 TABLET | Refills: 2 | Status: SHIPPED | OUTPATIENT
Start: 2017-11-02 | End: 2018-12-26

## 2017-11-02 NOTE — LETTER
November 2, 2017      Arleen Garcia MD  [address]  [place] [address]           [facility]  [address]  [facility] [address]  Phone: [phone]          Patient: Chris Asencio   MR Number: [id number]   YOB: 1931   Date of Visit: 11/2/2017       Dear Dr. Arleen Garcia:    Thank you for referring Chris Asencio to me for evaluation. Attached you will find relevant portions of my assessment and plan of care.    If you have questions, please do not hesitate to call me. I look forward to following Chris Asencio along with you.    Sincerely,    Kerry Rincon, JOSE JUAN    Enclosure  CC:  No Recipients    If you would like to receive this communication electronically, please contact [email or web address] or [phone] to request more information on [facility] access.    For providers and/or their staff who would like to refer a patient to [facility], please contact us through our one-stop-shop provider referral line, Efrem Bravo, at [phone].    If you feel you have received this communication in error or would no longer like to receive these types of communications, please e-mail [email or web address]

## 2017-11-02 NOTE — PATIENT INSTRUCTIONS
"Bathe legs daily with dove soap and water.  Triamcinolone cream to lower legs twice daily.  Spectazole cream to feet daily.  Medihoney gel to bilateral second toe ulcers and skin breakdown left dorsal foot daily, cover with gauze and secure with roll gauze.  Place cotton in between toes.  Cover legs with gauze and roll gauze.  Compression with two 4" ace wraps bilateral lower legs.    [facility] notified of orders via EPIC fax.  Elevate legs when seated.  Do not keep legs dependent.    "

## 2017-11-02 NOTE — PROGRESS NOTES
Subjective:       Patient ID: Chris Asencio is a 86 y.o. male.    Chief Complaint: Wound Check; Tinea Pedis; and Dermatitis    Wound Check     This patient is well known to my service.  He has had problems with hyperkeratosis of both lower legs for several years now.  At one time he was seen by Dr. Dumont who prescribed amlactin and soriatane.  He is supposed to be using triamcinolone cream on the legs twice daily and spectazole cream on the feet daily.  He has run out of the spectazole cream and it does not appear that the trimacinolone cream is being used as prescribed.  He also has wounds on both feet.  He still has the tinea pedis and stasis dermatitis.  Ace wraps are being used to control the edema.  He is afebrile.  He denies increased swelling, redness, or purulent drainage.  He does not have any pain.  His medical history is significant for type II diabetes.    Review of Systems   HENT: Negative for hearing loss, postnasal drip, rhinorrhea, sinus pressure, sneezing, tinnitus and trouble swallowing.    Eyes: Positive for visual disturbance (legally blind).   Respiratory: Positive for shortness of breath. Negative for apnea and wheezing.    Cardiovascular: Negative for palpitations and leg swelling.   Gastrointestinal: Negative for constipation and diarrhea.   Endocrine: Negative for cold intolerance, heat intolerance, polydipsia and polyuria.   Genitourinary: Negative for difficulty urinating, dysuria, frequency and hematuria.   Musculoskeletal: Negative for back pain.   Skin: Negative for wound.   Allergic/Immunologic: Negative for environmental allergies and food allergies.   Neurological: Negative for dizziness and light-headedness.   Hematological: Bruises/bleeds easily.   Psychiatric/Behavioral: Negative for confusion, decreased concentration, dysphoric mood and sleep disturbance. The patient is not nervous/anxious.        Objective:      Physical Exam   Constitutional: He is oriented to person, place,  and time. He appears well-developed and well-nourished. No distress.   HENT:   Head: Normocephalic and atraumatic.   Pulmonary/Chest: Effort normal. No respiratory distress.   Musculoskeletal: Normal range of motion. He exhibits no edema or tenderness.        Legs:       Feet:    Neurological: He is alert and oriented to person, place, and time.   Skin: Skin is warm and dry. No rash noted. He is not diaphoretic. No erythema.   Psychiatric: He has a normal mood and affect. His behavior is normal. Judgment and thought content normal.   Nursing note and vitals reviewed.      ..  Hemoglobin A1C   Date Value Ref Range Status   10/20/2017 4.8 4.0 - 5.6 % Final     Comment:     According to ADA guidelines, hemoglobin A1c <7.0% represents  optimal control in non-pregnant diabetic patients. Different  metrics may apply to specific patient populations.   Standards of Medical Care in Diabetes-2016.  For the purpose of screening for the presence of diabetes:  <5.7%     Consistent with the absence of diabetes  5.7-6.4%  Consistent with increasing risk for diabetes   (prediabetes)  >or=6.5%  Consistent with diabetes  Currently, no consensus exists for use of hemoglobin A1c  for diagnosis of diabetes for children.  This Hemoglobin A1c assay has significant interference with fetal   hemoglobin   (HbF). The results are invalid for patients with abnormal amounts of   HbF,   including those with known Hereditary Persistence   of Fetal Hemoglobin. Heterozygous hemoglobin variants (HbAS, HbAC,   HbAD, HbAE, HbA2) do not significantly interfere with this assay;   however, presence of multiple variants in a sample may impact the %   interference.     03/10/2017 4.7 4.5 - 6.2 % Final     Comment:     According to ADA guidelines, hemoglobin A1C <7.0% represents  optimal control in non-pregnant diabetic patients.  Different  metrics may apply to specific populations.   Standards of Medical Care in Diabetes - 2016.  For the purpose of  "screening for the presence of diabetes:  <5.7%     Consistent with the absence of diabetes  5.7-6.4%  Consistent with increasing risk for diabetes   (prediabetes)  >or=6.5%  Consistent with diabetes  Currently no consensus exists for use of hemoglobin A1C  for diagnosis of diabetes for children.     02/04/2016 5.1 4.5 - 6.2 % Final     Assessment:       1. Hyperkeratosis    2. Tinea pedis of both feet    3. Type 2 diabetes mellitus with complication, without long-term current use of insulin    4. Dystrophic nail        Plan:           Consult podiatry for nail reduction.  Consult dermatology for evaluation of hyperkeratosis.  Bathe legs daily with dove soap and water.  Triamcinolone cream to lower legs twice daily.  Spectazole cream to feet daily.  Medihoney gel to bilateral second toe ulcers and skin breakdown left dorsal foot daily, cover with gauze and secure with roll gauze.  Place cotton in between toes.  Cover legs with gauze and roll gauze.  Compression with two 4" ace wraps bilateral lower legs.    [facility] notified of orders via EPIC fax.  Skilled nurse visit-3 x weekly.  Elevate legs when seated.  Do not keep legs dependent.  Return to this clinic in 3 weeks.            Left foot    Right foot                                                                                                                                                                    "

## 2017-11-02 NOTE — TELEPHONE ENCOUNTER
Spk with chantal to inform that Dr. Malloy only sees patients on Tues and Thurs from 9:00am till 12:40. Chantal will call back to see when the patient can come in.  ----- Message from Gena Glynn sent at 11/2/2017  9:21 AM CDT -----  Contact: Wound Care Sara   BV-pt- Sara is calling to speak with the nurse pt needs to be seen asap for hyper Keratonosis Sara said its inflamed on both legs all over. Can you please call pts daughter Chantal  at [phone]. If you can please schedule the appt after 1:00 pt does better in the afternoon     POLO

## 2017-11-06 ENCOUNTER — TELEPHONE (OUTPATIENT)
Dept: INTERNAL MEDICINE | Facility: CLINIC | Age: 82
End: 2017-11-06

## 2017-11-06 NOTE — TELEPHONE ENCOUNTER
----- Message from Bebeto Malcolm sent at 11/6/2017  9:07 AM CST -----  Contact: Hyac[facility] at [phone]  Hyacinth calling to request a verbal order for pt to receive aid services for pt to receive a bath at home.

## 2017-11-06 NOTE — TELEPHONE ENCOUNTER
Per PCP okay for patient to received aide she states she has sent information and per Daughter they have not received the AIDE. Per Hyacinth they cant go into the home as of recently due to CMS guidelines as they have 3 people in the home and they are not giving the patient a bath daily.   Hyacinth states she will fax the form to the office as it is needed every 60 days for recert..

## 2017-11-08 ENCOUNTER — OFFICE VISIT (OUTPATIENT)
Dept: PODIATRY | Facility: CLINIC | Age: 82
End: 2017-11-08
Payer: MEDICARE

## 2017-11-08 VITALS — BODY MASS INDEX: 25.04 KG/M2 | WEIGHT: 169.06 LBS | HEIGHT: 69 IN

## 2017-11-08 DIAGNOSIS — E11.51 DIABETES MELLITUS WITH PERIPHERAL CIRCULATORY DISORDER: Primary | ICD-10-CM

## 2017-11-08 DIAGNOSIS — L57.0 KERATOSIS: ICD-10-CM

## 2017-11-08 DIAGNOSIS — I87.2 VENOUS INSUFFICIENCY: ICD-10-CM

## 2017-11-08 DIAGNOSIS — L97.521 TOE ULCER, LEFT, LIMITED TO BREAKDOWN OF SKIN: ICD-10-CM

## 2017-11-08 DIAGNOSIS — B35.1 ONYCHOMYCOSIS DUE TO DERMATOPHYTE: ICD-10-CM

## 2017-11-08 DIAGNOSIS — L97.511 SKIN ULCER OF TOE OF RIGHT FOOT, LIMITED TO BREAKDOWN OF SKIN: ICD-10-CM

## 2017-11-08 PROCEDURE — 99203 OFFICE O/P NEW LOW 30 MIN: CPT | Mod: S$GLB,,,

## 2017-11-08 NOTE — PROGRESS NOTES
Subjective:      Patient ID: Chris Asencio is a 86 y.o. male.    Chief Complaint: Diabetic Foot Exam (HgbA1c: 4.8 10/20/17 PCP: Jose 10/20/17)    Chris is a 86 y.o. male who presents to the clinic for evaluation and treatment of high risk feet. Chris has a past medical history of *Atrial fibrillation; Diabetes mellitus type II; Glaucoma; Hyperlipidemia; Hypertension; and Kyphosis. The patient's chief complaint are bilateral toe ulcers, severe dry legs and feet, le edema.  He does go to [facility] wound [facility] for his legs and toe ulcers, sees them q 3 weeks.  Compression dressings are placed by family.  This patient has documented high risk feet requiring routine maintenance secondary to peripheral vascular disease.    PCP: Arleen Garcia MD        Current shoe gear:  Affected Foot: Tennis shoes     Unaffected Foot: Tennis shoes    Hemoglobin A1C   Date Value Ref Range Status   10/20/2017 4.8 4.0 - 5.6 % Final     Comment:     According to ADA guidelines, hemoglobin A1c <7.0% represents  optimal control in non-pregnant diabetic patients. Different  metrics may apply to specific patient populations.   Standards of Medical Care in Diabetes-2016.  For the purpose of screening for the presence of diabetes:  <5.7%     Consistent with the absence of diabetes  5.7-6.4%  Consistent with increasing risk for diabetes   (prediabetes)  >or=6.5%  Consistent with diabetes  Currently, no consensus exists for use of hemoglobin A1c  for diagnosis of diabetes for children.  This Hemoglobin A1c assay has significant interference with fetal   hemoglobin   (HbF). The results are invalid for patients with abnormal amounts of   HbF,   including those with known Hereditary Persistence   of Fetal Hemoglobin. Heterozygous hemoglobin variants (HbAS, HbAC,   HbAD, HbAE, HbA2) do not significantly interfere with this assay;   however, presence of multiple variants in a sample may impact the %   interference.     03/10/2017 4.7 4.5 - 6.2 %  Final     Comment:     According to ADA guidelines, hemoglobin A1C <7.0% represents  optimal control in non-pregnant diabetic patients.  Different  metrics may apply to specific populations.   Standards of Medical Care in Diabetes - 2016.  For the purpose of screening for the presence of diabetes:  <5.7%     Consistent with the absence of diabetes  5.7-6.4%  Consistent with increasing risk for diabetes   (prediabetes)  >or=6.5%  Consistent with diabetes  Currently no consensus exists for use of hemoglobin A1C  for diagnosis of diabetes for children.     02/04/2016 5.1 4.5 - 6.2 % Final       Past Medical History:   Diagnosis Date    *Atrial fibrillation     Diabetes mellitus type II     Glaucoma     Hyperlipidemia     Hypertension     Kyphosis        Past Surgical History:   Procedure Laterality Date    CHOLECYSTECTOMY         Family History   Problem Relation Age of Onset    Melanoma Neg Hx        Social History     Social History    Marital status:      Spouse name: N/A    Number of children: N/A    Years of education: N/A     Social History Main Topics    Smoking status: Former Smoker     Start date: 4/16/1984    Smokeless tobacco: Former User    Alcohol use No    Drug use: No    Sexual activity: Not Currently     Partners: Female     Other Topics Concern    None     Social History Narrative    None       Current Outpatient Prescriptions   Medication Sig Dispense Refill    amoxicillin (AMOXIL) 500 MG capsule Take 500 mg by mouth every 12 (twelve) hours.  0    blood sugar diagnostic Strp 1 strip by Misc.(Non-Drug; Combo Route) route once daily. 100 strip 4    blood-glucose meter kit Use as instructed 1 each 0    econazole nitrate 1 % cream AAA bid to feet 85 g 2    glipiZIDE (GLUCOTROL) 2.5 MG TR24 take 1 tablet by mouth once daily 30 tablet 3    hydrocodone-acetaminophen 7.5-325mg (NORCO) 7.5-325 mg per tablet Take 1 tablet by mouth every 6 (six) hours as needed for Pain. 30 tablet  "0    [START ON 11/17/2017] hydrocodone-acetaminophen 7.5-325mg (NORCO) 7.5-325 mg per tablet Take 1 tablet by mouth every 6 (six) hours as needed for Pain. 30 tablet 0    [START ON 12/15/2017] hydrocodone-acetaminophen 7.5-325mg (NORCO) 7.5-325 mg per tablet Take 1 tablet by mouth every 6 (six) hours as needed for Pain. 30 tablet 0    lancets Misc 1 Units by Misc.(Non-Drug; Combo Route) route once daily. 100 each 4    meloxicam (MOBIC) 15 MG tablet Take 0.5 tablets (7.5 mg total) by mouth once daily. 20 tablet 0    metoprolol tartrate (LOPRESSOR) 25 MG tablet Take 1 tablet (25 mg total) by mouth 2 (two) times daily. 270 tablet 2    metoprolol tartrate (LOPRESSOR) 25 MG tablet take 1 tablet by mouth three times a day 270 tablet 2    metoprolol tartrate (LOPRESSOR) 25 MG tablet take 1 tablet by mouth three times a day 270 tablet 2    triamcinolone acetonide 0.1% (KENALOG) 0.1 % cream apply to affected area twice a day 454 g 3    warfarin (COUMADIN) 1 MG tablet take 1 tablet by mouth once daily 30 tablet 3    warfarin (COUMADIN) 5 MG tablet take 1 tablet by mouth once daily 30 tablet 3    zinc oxide 3 X 10 "-yard Bndg Apply 1 application topically Every 3 (three) days. 12 each 11     No current facility-administered medications for this visit.        Review of patient's allergies indicates:  No Known Allergies      ROS  ROS:  Constitution: Negative for chills, fever, weakness and malaise/fatigue.   HEENT: Negative for headaches.   Cardiovascular: Negative for chest pain and claudication.   Respiratory: Negative for cough and shortness of breath.   Musculoskeletal: Positive for foot pain.  Negative for muscle cramps and muscle weakness.   Gastrointestinal: Negative for nausea and vomiting.   Neurological:(--) for numbness, tingling and paresthesias.   Dermatological:  (+) for wound.          Objective:      Physical Exam  Constitutional:  Patient is oriented to person, place, and time. Vital signs are normal. "  Appears well-developed and well-nourished.     Vascular:  Dorsalis pedis pulses are 1/4 on the right side, and 1/4 on the left side.   Posterior tibial pulses are 1/4 on the right side, and 1/4 on the left side.   - digital hair growth, capillary fill time to all toes <3 seconds, toes are cool touch, mild LEl swelling    Skin/Dermatological:  Skin is warm and intact.  No cyanosis or clubbing.  No rashes noted.  No open wounds.  All ten toenails yellow discolored, thickened 2-4 mm to base with subungual debris.  Severe keratosis b/l LE, b/l 2nd toe ulcers dorsal pipj, granular    Musculoskeletal:      Hallux abducto valgus bilaterally, hammertoes 2-5 observed.  Pedal rom within normal limits.  (+) ankle joint DF restricted with both knee flexed and extened.    Neurological:  (--) deficits to sharp/dull, light touch or vibratory sensation bilateral feet, ten points tested.   Muscle strength to tibialis anterior, extensor hallucis longus, extensor digitorum longus, peroneal muscles, flexor hallucis/digotorum longus, posterior tibial and gastrosoleal complex is 5/5, normal tone without assymmetry   Patellar reflexes are 2+ on the right side and 2+ on the left side.  Achilles reflexes are 2+ on the right side and 2+ on the left side.        Assessment:       Encounter Diagnoses   Name Primary?    Diabetes mellitus with peripheral circulatory disorder Yes    Skin ulcer of toe of right foot, limited to breakdown of skin     Toe ulcer, left, limited to breakdown of skin     Venous insufficiency     Keratosis     Onychomycosis due to dermatophyte          Plan:       Chris was seen today for diabetic foot exam.    Diagnoses and all orders for this visit:    Diabetes mellitus with peripheral circulatory disorder    Skin ulcer of toe of right foot, limited to breakdown of skin    Toe ulcer, left, limited to breakdown of skin    Venous insufficiency    Keratosis    Onychomycosis due to dermatophyte    Other orders  -     " zinc oxide 3 X 10 "-yard Bndg; Apply 1 application topically Every 3 (three) days.      I counseled the patient on his conditions, their implications and medical management.    Shoe inspection. Diabetic Foot Education. Patient reminded of the importance of good nutrition and blood sugar control to help prevent podiatric complications of diabetes. Patient instructed on proper foot hygeine. We discussed wearing proper shoe gear, daily foot inspections, never walking without protective shoe gear, never putting sharp instruments to feet.  We also discussed padding and shoes with high toe boxes for foot deformities.    - With patient's permission, all ten toenails were aggressively reduced and debrided  to their soft tissue attachment mechanically with nail nipper, removing all offending nail and debris.Patient will continue to monitor the areas daily, inspect feet, wear protective shoe gear when ambulatory, moisturizer to maintain skin integrity and follow in this office in approximately 4 months, sooner p.r.n.    Continue wound care with Kerry Rincon.  He will soak feet tonight and wash with soap and water, resume compression therapy.    Border dressings to the toe ulcers.  These are nearly healed.    Too Vazquez, LOGAN        "

## 2017-11-13 ENCOUNTER — TELEPHONE (OUTPATIENT)
Dept: INTERNAL MEDICINE | Facility: CLINIC | Age: 82
End: 2017-11-13

## 2017-11-13 NOTE — TELEPHONE ENCOUNTER
----- Message from Argentina Albarran sent at 11/13/2017 11:50 AM CST -----  Contact: [phone] Hyacinth Claros spoke with Ivana last week about the re-certification for home health. Has it been signed.When will you be sending it back ?

## 2017-11-13 NOTE — TELEPHONE ENCOUNTER
Called and spoke with Hyacinth and advised the forms will be sent to the office on 11/14/2017 and termed the call

## 2017-11-14 ENCOUNTER — TELEPHONE (OUTPATIENT)
Dept: INTERNAL MEDICINE | Facility: CLINIC | Age: 82
End: 2017-11-14

## 2017-11-14 NOTE — TELEPHONE ENCOUNTER
X-rays show no acute abnormalities.  Labs are stable.  Hemoglobin A1c is low.  Discontinue glipizide.

## 2017-11-15 ENCOUNTER — TELEPHONE (OUTPATIENT)
Dept: WOUND CARE | Facility: CLINIC | Age: 82
End: 2017-11-15

## 2017-11-15 NOTE — TELEPHONE ENCOUNTER
----- Message from Hai Garcia sent at 11/15/2017 11:51 AM CST -----  Patient is returning call//please call back at [phone]//thank you

## 2017-11-20 ENCOUNTER — OUTPATIENT CASE MANAGEMENT (OUTPATIENT)
Dept: ADMINISTRATIVE | Facility: OTHER | Age: 82
End: 2017-11-20

## 2017-11-20 RX ORDER — INSULIN PUMP SYRINGE, 3 ML
EACH MISCELLANEOUS
Qty: 1 EACH | Refills: 0 | Status: SHIPPED | OUTPATIENT
Start: 2017-11-20 | End: 2018-04-03 | Stop reason: SDUPTHER

## 2017-11-20 NOTE — TELEPHONE ENCOUNTER
----- Message from Luz Jose RN sent at 11/20/2017  2:48 PM CST -----  Contact: Ronal sAencio [phone]  Hi. This is Luz Jose, MSN, RN. I am with the Outpatient case management team with [facility].  I received a referral on the above patient.  I spoke with patient's daughter today on the telephone.  Patient's daughter reports that the glucometer is broken.  Can you please write an order for a new glucometer and supplies and send it to the appropriate pharmacy?      Please notify patient's daughter of your recommendations.     Thank you,  Luz Jose, MSN, RN

## 2017-11-20 NOTE — PROGRESS NOTES
11/20/17- Case transferred to myself on 11/20/17.  Initial assessment and RN assessment completed with the pt's daughter, Ronal Asencio.  Pt's daughter reports that the patient lives in a 1st story apartment with her and her son; states that she is the primary caregiver for pt.  Ronal reports that pt needs assistance with bathing, but is otherwise able to complete his ADLs.  Pt is unable to perform his IADLs and those are completed by his daughter.  Ronal reports that pt receives home health services 1x/week and that they also monitor his monthly INR.  Ronal reports that pt uses a cane or walker to aid with ambulation and that they also have a wheelchair in the home.  Ronal reports that pt has had 1 previous fall, which resulted in an ED visit, but denies pt sustained any injuries.  I will mail educational literature about fall prevention to patient/family; will review literature with patient/family at next phone call.  Ronal reports that since the fall, pt was initially scared to get up and walk on his own; consequently, pt was intentionally eating less in order to avoid having to get up and use the bathroom.  Ronal reports that pt's decreased appetite has resolved and pt is confident in his ability to ambulate on his own.  Ronal reports that there was one month where there was an issue paying for patient's medications, but that this isn't a common occurrence and pt gets his medications daily.  In regards to pt's diabetes, Ronal reports that she used to check pt's blood sugar daily, but that she no longer does due to the glucometer at home not working.  Will message Dr. SONYA Garcia asking to write order for new glucometer and supplies and fax to the appropriate pharmacy.  Ronal reports that she checks pt's feet daily and that his ulcers are healing.  I will mail educational literature about diabetic foot check and S/S of wound infection to patient/family; will review literature with  patient/family at next phone call.  Ronal reports wanting pt to go on social outings and hopes that [facility] is able to assist with this.  Ronal does not want pt placed in nursing home, but wants assistance with other community resources.  Ronal states that prior to pt losing his vision, he enjoyed going to the casino and playing bingo.  Ronal reports that she doesn't often take pt on outings by herself because she is unable to take pt to the restroom if needed.  Senior resource guide mailed to caregiver.  Will refer to [facility] for assistance with community resources.                  Follow-up Plan:  - Continue to educate about fall prevention.  Encourage patient to follow medication and treatment regimen.  Encourage patient to maintain follow up with doctors.  - Continue to educate about diabetic foot inspections and wound infection.  Review signs and symptoms of infection.  Encourage patient to follow medication and treatment regimen.  Encourage patient to maintain follow up with doctors.  - Follow up with Dr. SONYA Garcia's recommendations  - Collaborate with [facility] about available resources.  - Inquire if interested in Life Alert system  - Is pt still active with home health?    Luz Jose, MSN, RN

## 2017-11-27 ENCOUNTER — OUTPATIENT CASE MANAGEMENT (OUTPATIENT)
Dept: ADMINISTRATIVE | Facility: OTHER | Age: 82
End: 2017-11-27

## 2017-11-27 NOTE — PROGRESS NOTES
11/27/17- Follow up call completed with patient's daughter, Ronal [phone].  Ronal reports that patient is ambulating well with the assistance of his walker.  Ronal reports that patient has not had any falls since last contact with this RN on 11/20/17.  Continue to educate about fall prevention.  Encourage patient to follow medication and treatment regimen.  Encourage patient to maintain follow up with doctors.  Ronal reports that she has not received the educational materials that this RN mailed last week.  I will re-mail educational literature about fall prevention to patient/family; will review literature with patient/family at next phone call.  Ronal reports that patient is still receiving home health services who is coming 2-3x/week.  Rnoal reports that patient's ulcers are almost completely healed and denies any signs or symptoms of infection.  Continue to educate about the importance of diabetic foot checks.  Review signs and symptoms of infection.  Encourage patient to follow medication and treatment regimen.  Encourage patient to maintain follow up with doctors.  I will re-mail educational literature about diabetic foot check and S/S of wound infection to patient/family; will review literature with patient/family at next phone call.  Ronal reports that she has not picked up the glucometer ordered by Dr. SONYA Garcia because she was not aware that a new one had been ordered.  This RN informed patient that the glucometer order was sent to [facility].  Ronal reports that she has not been notified by [facility] that a prescription was ready, but then stated that her phone has been acting up the past few days.  Ronal reports that she would call [facility] to inquire about the glucometer.  Ronal reports that patient is never left alone; if she leaves the house, she makes sure one of her sons is there with patient.  This RN educated Ronal about Life Alert and encouraged her to give it some  thought incase the patient has to be left alone in the future.  Ronal reports that she questioned patient about going on social outings and patient stated he didn't want to do anything without her.  This RN will re-mail senior resource guide and discuss the benefits of adult day cares at next phone call.        Follow-up Plan:  - Continue to educate about fall prevention.  Encourage patient to follow medication and treatment regimen.  Encourage patient to maintain follow up with doctors.  - Continue to educate about diabetic foot inspections and wound infection.  Review signs and symptoms of infection.  Encourage patient to follow medication and treatment regimen.  Encourage patient to maintain follow up with doctors.  - Collaborate with [facility] about available resources.  - Inquire if interested in Life Alert system  - Did pt's daughter  glucometer from [employer]?  - Discuss adult day care    Luz Jose, JOÃO, RN

## 2017-11-28 ENCOUNTER — TELEPHONE (OUTPATIENT)
Dept: WOUND CARE | Facility: CLINIC | Age: 82
End: 2017-11-28

## 2017-11-28 ENCOUNTER — OUTPATIENT CASE MANAGEMENT (OUTPATIENT)
Dept: ADMINISTRATIVE | Facility: OTHER | Age: 82
End: 2017-11-28

## 2017-11-28 NOTE — TELEPHONE ENCOUNTER
----- Message from eJrri Gonzalez sent at 11/28/2017  2:03 PM CST -----  Contact: Pt daughter   Pt daughter called to inform the doctor that pt will not make his appt today. Pt would like a call back to reschedule       Pt daughter can be contacted at [phone]

## 2017-11-28 NOTE — LETTER
November 28, 2017    Chris Asencio  [address]  [facility] [address]             Outpatient Case Management  [address]  [facility] [address] Dear Chrislazaro Asencio:    We understand that receiving many services from different doctors and healthcare providers is overwhelming. There are appointments to make, transportation to arrange, dietary instructions to understand, and new medications to obtain.    This is where [facility] can help.     You are eligible to receive Outpatient Case Management services when you have healthcare needs that require the coordination of many providers, treatments, and services. You also qualify if you need assistance with a new treatment plan.     There is no charge for this support. You may have been referred to this program from your doctor(s), hospital staff member(s), or insurance company but you always have a choice to participate or not participate. To participate, you must give us your permission to be enrolled.     When you are enrolled in the [facility] program, the  who is assigned to you is    JOÃO Campos, RN    Depending on your needs and wishes, your  may speak with you by phone, visit you at your place of living (for example your home, skilled nursing facility, or rehabilitation facility), or meet you at your doctors office.     Your  will tell you why you have been selected to participate in the program and will complete an assessment of your needs. Then a personalized plan of care will be developed with you and or your caregiver.             Here are examples of the services your [facility]  provides.     Coordinate communication among multiple providers.   Arrange for transportation, doctors visits, durable medical equipment, home care services, and special clinics.    Provide coaching on how to manage your health condition.    Answer  questions about your health condition.   Help you understand your doctors treatment plan.    Provide additional instruction about your health condition, treatments, and medications.    Help you obtain information about your insurance coverage.    Advocate for your individual needs.    Request a Licensed Clinical  (LCSW) to visit you if you need their services. LCSWs help with long term planning (discussing placement options, advanced planning directives), financial planning, and assistance (for example rent, utilities, medication funding).     Your  will coordinate their activities with other outpatient services you are receiving. All services provided by [facility]  are coordinated with and communicated to your primary care physician.    Our goal is to help you manage your health condition(s) safely within your living environment, whether that is your home or a medical facility. We want to help you function at the healthiest and highest level possible.     Sincerely,      Mani Cabrera MD  Medical Director    Enclosures:    Frequently Asked Questions  Patient Rights and Responsibilities   Reporting a Grievance or Complaint  Consent/Release of Information  Stamped Addressed Envelope                  Frequently Asked Questions about [facility]    What is [facility]?  Outpatient Case management is not Home healthcare services. [facility]  do not provide hands-on care. [facility]  will work with your doctor to arrange for home health services, if needed. Home health services have a limited duration and there are some restrictions as to who can get these services. There is no prescribed limit to the amount of time you receive [facility] services. [facility]  are not agents of your insurance company. However, [facility] Case  Managers can help you obtain information from your insurance company.     Who are the [facility] ?  [facility]  are Registered Nurses and Social Workers. It is important to remember that you and your  are a team that works together with your primary care physician to create your individualized plan of care. The ultimate goal of your care plan is to help you implement your doctors treatment plan and to help you function at the highest level of health possible.     What are my rights as a patient?  It is important for you to know and understand your rights and responsibilities while receiving services from the [facility] program. We have enclosed a complete description of your rights and responsibilities. You can help to make your care more effective when you understand your right and responsibilities.     What is needed to be enrolled in the program?  You are only enrolled in the [facility] Program when you give us your consent to participate. You will find enclosed a consent form. You are receiving this letter because you or your caregivers have given us a verbal consent to enroll you in [facility] Program. We ask that you sign and return the enclosed written consent in the stamped self-addressed envelope.                           Patient Rights and Responsibilities    We consider you a partner in your care. When you are well informed, participate in treatment decisions and communicate openly with your doctor and other healthcare professionals, you help make your care more effective.     While you are in the Outpatient Case Management Program, your rights include the following:     You have a right to be provided services in a non-discriminatory manner in accordance with the provisions of Title VI of the Civil Rights Act of 1964, Section 504 of the Rehabilitation Act of 1973, the Age  Discrimination Act of 1975, the Americans with Disabilities Act as well as any other applicable Federal and State laws and regulations.     You have the right to a reasonable, timely response to your request or need for care, as well as the right to considerate and respectful care including an environment that preserves dignity and contributes to a positive self-image. You are responsible for being considerate and respectful of our staff.     You have a right to information regarding patient rights, advocacy services and complaint mechanisms, and the right to prompt resolution of any complaint. You or a designee has the right to participate in the resolution of ethical issues surrounding your care. You have a right to file a complaint if you feel that your rights have been infringed, without fear or penalty from [facility] or the federal government. You may file a complaint with the Director of Outpatient Case Management by calling [phone]. At any time, you may lodge a grievance with the [facility] and [facility] by calling [phone], or the Joint Commission on Accreditation of Healthcare Organizations at [phone].     You, or someone acting on your behalf, have the right to understandable information on your health status, treatment and progress in order to make decisions. You have the right to know the nature, risks and alternatives to treatment. You have the right to be informed, when appropriate, regarding the outcome of the care that has been provided. You have the right to refuse treatment to the extent permitted by law, and the right to be informed of the alternatives and consequences of refusing treatment.     You, in collaboration with your physician, have the right to make decisions regarding care and the right to participate in the development and implementation of the plan of care and effective pain management. You have the right to know the name and professional status of  those responsible for the delivery of your care and treatment.       You have a right, within legal guidelines, to have a guardian, next-of-kin or legal designee exercises your patient rights when you are unable to do so. You have the right for your wishes regarding end-of-life decisions to be addressed by the healthcare team through advance directives. You have the right to personal privacy and confidentiality and to expect confidentiality of all records and communications pertaining to your care.      You have the right to receive communications about your health information confidentially. You have the right to request restrictions on the uses and disclosures of your health information. You have the right to inspect, copy, request amendments and receive an accounting of to whom we have disclosed your health information.     You have the right to be provided with interpretation services if you do not speak English; to alternative communication techniques if you are hearing or vision impaired; and to have any other resources needed on your behalf to ensure effective communication. These services are provided free of charge.     You have a right to personal safety (free from mental, physical, sexual and verbal abuse, neglect and exploitation). You have the right to access protective and advocacy services.     Advance Directives  A Patient Advocate is available to meet with patients to answer questions regarding advance directives.    Living Will  A document that outlines what medical treatment the patient does or does not want in the event the patient becomes unable to make those decisions at the appropriate time.    Durable Medical Power of   A document by which the patient designates an individual to be responsible for making medical decisions in the event the patient becomes unable to do so.    HIPAA Notice of Privacy Practices  Your medical information is governed by federal privacy laws. HIPAA  protects private medical information and how that information is disclosed. If you have a question regarding the HIPAA Notice of Privacy Practices, or if you believe your privacy rights have been violated, you may call our designated hotline at [phone].            Quality Improvement  Because we consistently strive to improve the care and service provided to our patients, we welcome your feedback. Your comments are an important part of our quality improvement process, as we like to know what we are doing right and which areas are in need of improvement. Our policy is to listen, be responsive and provide you with an appropriate and timely follow-up to your questions or concerns. Our goal is active patient and family involvement in all aspects of the care process.                                                                                  Reporting a Grievance or Complaint    During your time with the [facility] team you may have a grievance or complaint with our services. Your Patients Bill of Rights gives patients, families, and caregivers the right to express concerns and grievances and the right to expect a reasonable and timely response.     Your presentation of your concerns is not viewed negatively. It is an opportunity for us to improve the quality of our care and services we provide to you.     You may report your concerns directly to your , or you can phone in a complaint to:     Director of Outpatient Case Management  [phone]    You may also send a complaint letter to:    Director of Outpatient Case Management Services  [address] [place] [id number]    Tell us the details of your complaint and provide us with a contact phone number so we can contact you to obtain additional information. We will return a call to you within two business days of our receipt of your complaint, and to request additional information as needed. If you choose to  mail a letter, your complaint may take a few days longer to reach us.     All grievances will be addressed as quickly as possible. A grievance or complaint that involves situations or practices which place patients in immediate danger will be addressed as an urgent matter. We will work to resolve all other complaints within seven days of receipt. By that time, you will receive a phone call with either the resolution of your complaint, or a plan for corrective action. A formal written response will be sent to you within 30 days of receipt of your grievance.     If a resolution cannot be completed within 30 days, a letter will be sent to you or your family member with an estimated time for the final response.    Additionally, all patients have the right to file complaints with external agencies, without exception. Complaints/grievances can be addressed to the following agencies:            Patient Safety or Quality of Care Concerns  Office of Quality Monitoring   [employer] [facility] [place] [address]  [phone] Toll Free    HIPPA Privacy/Security Concerns  Office for Civil Rights Region IV  [place] Department of Health & Human Services  [address] [place] [address], [address]  [place] [address] [phone] Phone  [phone] TDD  [phone] Toll Free    Medicare/Medicaid Billing Concerns  [place] for Medicare & Medicaid Services  [address]  [address] [place] [address], [address]  [place] [address] [phone] Phone  [phone] Toll Free    General Concerns  [place] [facility] ([facility])  [phone] Toll Free Complaint Hotline                                                              Consent Form/Release of Information    By signing--     (1) I agree I have read the Outpatient Case Management information provided to me;     (2) I agree to voluntarily participate in the Outpatient Case Management program;     (3) I understand I must consent to  participation in the Outpatient Case Management program during my first interview with my ;    (4) I consent to the discussion and release of my personal health information to my healthcare team (including my personal physician, my medical home care team, any specialty physician(s), and my [facility] team);     (5) I agree my consent is valid for the length of time I am receiving Outpatient Case Management;    (6) I agree to referrals to community resources which my Case Management team recommends for me. I agree to the release of my personal information and personal health information as necessary to referral sources.    ___________________________________________________________________  Patients Printed Name     ___________________________________________________________________  Patients Signature       Date    If patient is in being cared for, please complete this section:     ___________________________________________________________________  Printed Name of Person Caring For Patient   Relationship To Patient    ___________________________________________________________________   Signature of Person Caring For Patient     Date    PLEASE SIGN AND RETURN IN THE ENCLOSED PRE-ADDRESSED ENVELOPE.

## 2017-11-28 NOTE — PROGRESS NOTES
Please note an Outpatient Complex Care Management welcome packet and consent form was created and mailed to the patient on 11/28/2017.    Please contact [facility] at [phone] with any questions.    Thank you,  Tahira Wallace

## 2017-11-30 ENCOUNTER — OUTPATIENT CASE MANAGEMENT (OUTPATIENT)
Dept: ADMINISTRATIVE | Facility: OTHER | Age: 82
End: 2017-11-30

## 2017-11-30 NOTE — PROGRESS NOTES
11-30-17--Please note the following patient has been transferred to Patito WASHBURN in Outpatient Complex Care Management. Coreen WASHBURN CCM

## 2017-12-01 ENCOUNTER — TELEPHONE (OUTPATIENT)
Dept: INTERNAL MEDICINE | Facility: CLINIC | Age: 82
End: 2017-12-01

## 2017-12-01 NOTE — TELEPHONE ENCOUNTER
Called and spoke with Kathleen and advised to resend the forms and we will fax all the signed forms. She understood and termed the call

## 2017-12-01 NOTE — TELEPHONE ENCOUNTER
----- Message from Lela Reich sent at 11/29/2017  2:47 PM CST -----  Contact: [facility] [phone]  Requesting a call back in regards to orders sent. Stated she did not receive it in its entirety. Please advise

## 2017-12-11 ENCOUNTER — OUTPATIENT CASE MANAGEMENT (OUTPATIENT)
Dept: ADMINISTRATIVE | Facility: OTHER | Age: 82
End: 2017-12-11

## 2017-12-11 NOTE — PROGRESS NOTES
Patient was referred by OPCM RN Theresa Montenegro as patient's daughter was requesting information on Adult Day Care and other programs for socialization. Telephonic encounter with patient's daughter, Ronal Asencio, to complete [facility] SW Assessment. Ronal said she spoke with patient who said he was not interested in any programs unless she would be able to go with him.  Ronal said she is not able to do that and expressed that tho the patient.  She declined [facility] SW support.  Ronal said patient was supposed to get a glucometer but is has not arrived.  She said the two home health nurses were suppose to be working on it for patient.   advised patient to contact Medicare directly in order to resolve the problem.  No other needs identified at this time.  Ms. Asencio was encouraged to call should additional support be needed in the future.  Case closed. OPCM [facility] notified via Epic mail.

## 2017-12-12 ENCOUNTER — TELEPHONE (OUTPATIENT)
Dept: WOUND CARE | Facility: CLINIC | Age: 82
End: 2017-12-12

## 2017-12-12 NOTE — TELEPHONE ENCOUNTER
Spoke with daughter who forgot about rescheduled appointment - appointment rescheduled again for 12/21/17 at 420PM. Appointment reminder mailed out

## 2017-12-14 ENCOUNTER — TELEPHONE (OUTPATIENT)
Dept: INTERNAL MEDICINE | Facility: CLINIC | Age: 82
End: 2017-12-14

## 2017-12-14 NOTE — TELEPHONE ENCOUNTER
----- Message from Maribeth Rain sent at 12/14/2017 12:56 PM CST -----  Contact: Trang with [facility]  [phone]  They are waiting on a re-cert order from you and they really really need it back. Fax [phone].

## 2017-12-20 RX ORDER — HYDROCODONE BITARTRATE AND ACETAMINOPHEN 7.5; 325 MG/1; MG/1
1 TABLET ORAL EVERY 6 HOURS PRN
Qty: 30 TABLET | Refills: 0 | Status: SHIPPED | OUTPATIENT
Start: 2017-12-20 | End: 2018-02-01 | Stop reason: SDUPTHER

## 2017-12-20 NOTE — TELEPHONE ENCOUNTER
----- Message from Rosita Dominguez sent at 12/20/2017 11:48 AM CST -----  Contact: Daughter/Ronal [phone]  Prescription Request:     Name of medication: hydrocodone-acetaminophen 7.5-325mg (NORCO) 7.5-325 mg per tablet    Reason for request: Refill    Please notify patient when Rx is ready to be picked up.    Thank You

## 2017-12-21 ENCOUNTER — OFFICE VISIT (OUTPATIENT)
Dept: WOUND CARE | Facility: CLINIC | Age: 82
End: 2017-12-21
Payer: MEDICARE

## 2017-12-21 DIAGNOSIS — L89.150 DECUBITUS ULCER OF SACRAL REGION, UNSTAGEABLE: ICD-10-CM

## 2017-12-21 DIAGNOSIS — L85.9 HYPERKERATOSIS: ICD-10-CM

## 2017-12-21 DIAGNOSIS — L89.320 DECUBITUS ULCER OF LEFT BUTTOCK, UNSTAGEABLE: ICD-10-CM

## 2017-12-21 DIAGNOSIS — I87.2 VENOUS INSUFFICIENCY: ICD-10-CM

## 2017-12-21 DIAGNOSIS — B35.3 TINEA PEDIS OF BOTH FEET: ICD-10-CM

## 2017-12-21 DIAGNOSIS — I89.0 ELEPHANTIASIS NOSTRA VERRUCOSA: ICD-10-CM

## 2017-12-21 DIAGNOSIS — E11.8 TYPE 2 DIABETES MELLITUS WITH COMPLICATION, WITHOUT LONG-TERM CURRENT USE OF INSULIN: ICD-10-CM

## 2017-12-21 DIAGNOSIS — L97.521 TOE ULCER, LEFT, LIMITED TO BREAKDOWN OF SKIN: Primary | ICD-10-CM

## 2017-12-21 PROBLEM — L89.300 DECUBITUS ULCER OF BUTTOCK, UNSTAGEABLE: Status: ACTIVE | Noted: 2017-12-21

## 2017-12-21 PROCEDURE — 99212 OFFICE O/P EST SF 10 MIN: CPT | Mod: S$PBB,,, | Performed by: NURSE PRACTITIONER

## 2017-12-21 PROCEDURE — 99212 OFFICE O/P EST SF 10 MIN: CPT | Mod: PBBFAC | Performed by: NURSE PRACTITIONER

## 2017-12-21 PROCEDURE — 99999 PR PBB SHADOW E&M-EST. PATIENT-LVL II: CPT | Mod: PBBFAC,,, | Performed by: NURSE PRACTITIONER

## 2017-12-21 RX ORDER — TRIAMCINOLONE ACETONIDE 1 MG/G
CREAM TOPICAL
Qty: 454 G | Refills: 3 | Status: SHIPPED | OUTPATIENT
Start: 2017-12-21 | End: 2018-05-18 | Stop reason: SDUPTHER

## 2017-12-21 NOTE — PATIENT INSTRUCTIONS
"Bathe legs daily with dove soap and water.  Triamcinolone cream to lower legs twice daily.  Spectazole cream to feet daily.  Medihoney gel to right second toe ulcer, cover with gauze and secure with roll gauze.  Place cotton in between toes.  Cover legs with gauze and roll gauze.  Compression with two 4" ace wraps bilateral lower legs.      "

## 2017-12-21 NOTE — PROGRESS NOTES
Subjective:       Patient ID: Chris Asencio is a 86 y.o. male.    Chief Complaint: Wound Check    Wound Check     This patient is seen today for reevaluation of hyperkeratosis of both lower legs.  At one time he was seen by Dr. Dumont who prescribed amlactin and soriatane.  He is supposed to be using triamcinolone cream on the legs twice daily and spectazole cream on the feet daily.  He was also supposed to see dermatology but this never happened.  The wound to the left second toe is healed and the right second toe wound is smaller.  He still has the tinea pedis and stasis dermatitis.  Ace wraps are being used to control the edema.  He is afebrile.  He denies increased swelling, redness, or purulent drainage.  He does not have any pain.  His medical history is significant for type II diabetes.    Review of Systems   HENT: Negative for hearing loss, postnasal drip, rhinorrhea, sinus pressure, sneezing, tinnitus and trouble swallowing.    Eyes: Positive for visual disturbance (legally blind).   Respiratory: Positive for shortness of breath. Negative for apnea and wheezing.    Cardiovascular: Negative for palpitations and leg swelling.   Gastrointestinal: Negative for constipation and diarrhea.   Endocrine: Negative for cold intolerance, heat intolerance, polydipsia and polyuria.   Genitourinary: Negative for difficulty urinating, dysuria, frequency and hematuria.   Musculoskeletal: Negative for back pain.   Skin: Negative for wound.   Allergic/Immunologic: Negative for environmental allergies and food allergies.   Neurological: Negative for dizziness and light-headedness.   Hematological: Bruises/bleeds easily.   Psychiatric/Behavioral: Negative for confusion, decreased concentration, dysphoric mood and sleep disturbance. The patient is not nervous/anxious.        Objective:      Physical Exam   Constitutional: He is oriented to person, place, and time. He appears well-developed and well-nourished. No distress.    HENT:   Head: Normocephalic and atraumatic.   Pulmonary/Chest: Effort normal. No respiratory distress.   Musculoskeletal: Normal range of motion. He exhibits no edema or tenderness.        Legs:       Feet:    Neurological: He is alert and oriented to person, place, and time.   Skin: Skin is warm and dry. No rash noted. He is not diaphoretic. No erythema.   Psychiatric: He has a normal mood and affect. His behavior is normal. Judgment and thought content normal.   Nursing note and vitals reviewed.      ..  Hemoglobin A1C   Date Value Ref Range Status   10/20/2017 4.8 4.0 - 5.6 % Final     Comment:     According to ADA guidelines, hemoglobin A1c <7.0% represents  optimal control in non-pregnant diabetic patients. Different  metrics may apply to specific patient populations.   Standards of Medical Care in Diabetes-2016.  For the purpose of screening for the presence of diabetes:  <5.7%     Consistent with the absence of diabetes  5.7-6.4%  Consistent with increasing risk for diabetes   (prediabetes)  >or=6.5%  Consistent with diabetes  Currently, no consensus exists for use of hemoglobin A1c  for diagnosis of diabetes for children.  This Hemoglobin A1c assay has significant interference with fetal   hemoglobin   (HbF). The results are invalid for patients with abnormal amounts of   HbF,   including those with known Hereditary Persistence   of Fetal Hemoglobin. Heterozygous hemoglobin variants (HbAS, HbAC,   HbAD, HbAE, HbA2) do not significantly interfere with this assay;   however, presence of multiple variants in a sample may impact the %   interference.     03/10/2017 4.7 4.5 - 6.2 % Final     Comment:     According to ADA guidelines, hemoglobin A1C <7.0% represents  optimal control in non-pregnant diabetic patients.  Different  metrics may apply to specific populations.   Standards of Medical Care in Diabetes - 2016.  For the purpose of screening for the presence of diabetes:  <5.7%     Consistent with the  "absence of diabetes  5.7-6.4%  Consistent with increasing risk for diabetes   (prediabetes)  >or=6.5%  Consistent with diabetes  Currently no consensus exists for use of hemoglobin A1C  for diagnosis of diabetes for children.     02/04/2016 5.1 4.5 - 6.2 % Final     Assessment:       1. Toe ulcer, left, limited to breakdown of skin    2. Tinea pedis of both feet    3. Hyperkeratosis    4. Type 2 diabetes mellitus with complication, without long-term current use of insulin    5. Venous insufficiency        Plan:           Consult dermatology for evaluation of hyperkeratosis.  Bathe legs daily with dove soap and water.  Triamcinolone cream to lower legs twice daily.  Spectazole cream to feet daily.  Medihoney gel to right second toe ulcer, cover with gauze and secure with roll gauze.  Place cotton in between toes.  Cover legs with gauze and roll gauze.  Compression with two 4" ace wraps bilateral lower legs.    [facility] notified of orders via EPIC fax.  Skilled nurse visit-3 x weekly.  Elevate legs when seated.  Do not keep legs dependent.  Return to this clinic in 3 weeks.      Right shin    Right second toe                                                                                                                                                                            "

## 2018-01-04 ENCOUNTER — OUTPATIENT CASE MANAGEMENT (OUTPATIENT)
Dept: ADMINISTRATIVE | Facility: OTHER | Age: 83
End: 2018-01-04

## 2018-01-04 NOTE — PROGRESS NOTES
"Spoke with pt's daughter, Ronal. She states there have been many losses in the family last year. She states she just received news that her uncle was diagnosed with cancer. Discussed grief counseling/support groups. Offered referral to LCSW. She declined. She states she did receive the education materials in the mail and hasn't had a chance to read through them. She states pt has not had any falls. She states pt's blood sugars are "ok:". She could not recall the numbers.       Plan:   Continue education on diabetic ulcers and safety precautions.     "

## 2018-01-18 ENCOUNTER — OUTPATIENT CASE MANAGEMENT (OUTPATIENT)
Dept: ADMINISTRATIVE | Facility: OTHER | Age: 83
End: 2018-01-18

## 2018-01-18 NOTE — PROGRESS NOTES
01/18/18-Good morning. My name is Alana Schuster RN, I work for [facility]s Outpatient case management department with . I wanted to call and review your disaster plan with you. I also wanted to go over some crucial information and provide you with emergency phone numbers for your [place]. Called and spoke to patient's daughter Ronal.                                                                                                                                                                                                                                                                                            [] Called patient, no answer, I left a message with the phone number for the [place] Office of Emergency Preparedness.   [x] Please be sure to have a supply of water, non-perishable food items, flashlights and batteries with you in your house.   [x] Please be sure you bring all of your medications with you. Bring at least a five day supply. It is best to bring your medication bottles, in case you are displaced for a longer period of time, therefore you can get your medication refilled from your temporary location.   [x] Please bring sure to bring any DME that you may need. This includes walkers, wheelchairs, shower chairs, nebulizer machine, etc.   [x] If you are a diabetic- be sure to bring your glucometer and all glucometer monitoring supplies.   [x] If you have high blood pressure- be sure to bring your blood pressure cuff so you can continue to monitor.   [x] If you have CHF-be sure to bring your scale so you can continue to monitor.  [] If on PEG feeding- be sure to bring tube feedings and feeding supplies.  [] If on oxygen- be sure to bring all of your oxygen supplies: Cannula, portable tanks, concentrator, etc. Also contact you Oxygen Supply Company to find out the nearest location of an oxygen supply company to where you will be located. ([employer]: [phone], [facility]: [phone]:[phone], [employer]: [phone]).   [] If you receive hemodialysis- you should already have a plan in place with your dialysis center. If you do not know where you need to evacuate to in order to be close to a dialysis center- reach out to your dialysis center for further direction. ([facility]  service line: [phone], [facility]  service line [phone])  [] If receiving treatment at an infusion center- please contact the infusion center to find out which infusion center they have a contract with. Also ask your infusion center for location of the infusion center they are in contract with, so if need be you can evacuate to that area.   Office of Emergency Preparedness Phone number:  [] [facility]: [phone]  [x] [facility]: [phone]  [] [facility]: [phone]  [] [place]: [phone]  [] [facility]: [phone]  [] [facility]: [phone]  [] [facility]: [phone]  [] [facility]: [phone]  [] [facility]: [phone]  [] [facility]: [phone]  [] [facility]: [phone]  [] [facility]: [phone]  [] [facility]: [phone]    [] [facility]:  [phone]   [] [facility]:  [phone]   [] [facility]:  [phone]   [] [facility]:  [phone]   [] [facility]:  [phone]   [] [facility]: [phone]   [] [facility]:  [phone]   [] [facility]:  [phone]   [] [facility]:  [phone]   [] [facility]:  [phone]   [] [facility]:  [phone]   [] [place]:  [phone]   [] [facility]:  [phone]   [] [facility]:  [phone]   [] [facility]:  [phone]   [] [facility]: [phone]   [] [facility]:  [phone]   [] [facility]: [phone]   [] [facility]: [phone]

## 2018-01-25 RX ORDER — GLIPIZIDE 2.5 MG/1
TABLET, EXTENDED RELEASE ORAL
Qty: 30 TABLET | Refills: 0 | Status: SHIPPED | OUTPATIENT
Start: 2018-01-25 | End: 2018-03-02 | Stop reason: SDUPTHER

## 2018-01-25 RX ORDER — WARFARIN 1 MG/1
TABLET ORAL
Qty: 30 TABLET | Refills: 0 | Status: SHIPPED | OUTPATIENT
Start: 2018-01-25 | End: 2018-06-26 | Stop reason: SDUPTHER

## 2018-01-31 ENCOUNTER — OUTPATIENT CASE MANAGEMENT (OUTPATIENT)
Dept: ADMINISTRATIVE | Facility: OTHER | Age: 83
End: 2018-01-31

## 2018-02-01 ENCOUNTER — OFFICE VISIT (OUTPATIENT)
Dept: WOUND CARE | Facility: CLINIC | Age: 83
End: 2018-02-01
Payer: MEDICARE

## 2018-02-01 VITALS
HEART RATE: 81 BPM | DIASTOLIC BLOOD PRESSURE: 54 MMHG | TEMPERATURE: 98 F | BODY MASS INDEX: 26.58 KG/M2 | HEIGHT: 69 IN | WEIGHT: 179.44 LBS | SYSTOLIC BLOOD PRESSURE: 94 MMHG

## 2018-02-01 DIAGNOSIS — Z12.5 ENCOUNTER FOR PROSTATE CANCER SCREENING: ICD-10-CM

## 2018-02-01 DIAGNOSIS — E11.8 UNCONTROLLED TYPE 2 DIABETES MELLITUS WITH COMPLICATION, UNSPECIFIED LONG TERM INSULIN USE STATUS: Primary | ICD-10-CM

## 2018-02-01 DIAGNOSIS — L85.9 HYPERKERATOSIS: ICD-10-CM

## 2018-02-01 DIAGNOSIS — E11.65 UNCONTROLLED TYPE 2 DIABETES MELLITUS WITH COMPLICATION, UNSPECIFIED LONG TERM INSULIN USE STATUS: Primary | ICD-10-CM

## 2018-02-01 DIAGNOSIS — E11.8 TYPE 2 DIABETES MELLITUS WITH COMPLICATION, WITHOUT LONG-TERM CURRENT USE OF INSULIN: ICD-10-CM

## 2018-02-01 DIAGNOSIS — L89.150 DECUBITUS ULCER OF SACRAL REGION, UNSTAGEABLE: ICD-10-CM

## 2018-02-01 DIAGNOSIS — I10 ESSENTIAL HYPERTENSION: ICD-10-CM

## 2018-02-01 DIAGNOSIS — L97.521 TOE ULCER, LEFT, LIMITED TO BREAKDOWN OF SKIN: Primary | ICD-10-CM

## 2018-02-01 DIAGNOSIS — L89.320 DECUBITUS ULCER OF LEFT BUTTOCK, UNSTAGEABLE: ICD-10-CM

## 2018-02-01 PROCEDURE — 1159F MED LIST DOCD IN RCRD: CPT | Mod: ,,, | Performed by: NURSE PRACTITIONER

## 2018-02-01 PROCEDURE — 99999 PR PBB SHADOW E&M-EST. PATIENT-LVL V: CPT | Mod: PBBFAC,,, | Performed by: NURSE PRACTITIONER

## 2018-02-01 PROCEDURE — 1126F AMNT PAIN NOTED NONE PRSNT: CPT | Mod: ,,, | Performed by: NURSE PRACTITIONER

## 2018-02-01 PROCEDURE — 99212 OFFICE O/P EST SF 10 MIN: CPT | Mod: S$PBB,,, | Performed by: NURSE PRACTITIONER

## 2018-02-01 PROCEDURE — 99215 OFFICE O/P EST HI 40 MIN: CPT | Mod: PBBFAC | Performed by: NURSE PRACTITIONER

## 2018-02-01 NOTE — TELEPHONE ENCOUNTER
----- Message from Gabriela Melgoza sent at 2/1/2018 11:17 AM CST -----  Contact: Daughter/Ronal [phone]  Is this a refill or new RX:  Refill    RX name and strength: hydrocodone-acetaminophen 7.5-325mg (NORCO) 7.5-325 mg per tablet    Comments:  She would like a callback from [facility] concerning yourself

## 2018-02-01 NOTE — PROGRESS NOTES
Subjective:       Patient ID: Chris Asencio is a 86 y.o. male.    Chief Complaint: Wound Check    Wound Check     This patient is seen today for reevaluation of hyperkeratosis of both lower legs.  At one time he was seen by Dr. Dumont who prescribed amlactin and soriatane.  He is supposed to be using triamcinolone cream on the legs twice daily and spectazole cream on the feet daily.  He was also supposed to see dermatology but this never happened.  The wound to the right second toe wound is healed.  He still has the tinea pedis and stasis dermatitis.  Ace wraps are being used to control the edema.  He is afebrile.  He denies increased swelling, redness, or purulent drainage.  He does not have any pain.  His medical history is significant for type II diabetes.    Review of Systems   HENT: Negative for hearing loss, postnasal drip, rhinorrhea, sinus pressure, sneezing, tinnitus and trouble swallowing.    Eyes: Positive for visual disturbance (legally blind).   Respiratory: Positive for shortness of breath. Negative for apnea and wheezing.    Cardiovascular: Negative for palpitations and leg swelling.   Gastrointestinal: Negative for constipation and diarrhea.   Endocrine: Negative for cold intolerance, heat intolerance, polydipsia and polyuria.   Genitourinary: Negative for difficulty urinating, dysuria, frequency and hematuria.   Musculoskeletal: Negative for back pain.   Skin: Negative for wound.   Allergic/Immunologic: Negative for environmental allergies and food allergies.   Neurological: Negative for dizziness and light-headedness.   Hematological: Bruises/bleeds easily.   Psychiatric/Behavioral: Negative for confusion, decreased concentration, dysphoric mood and sleep disturbance. The patient is not nervous/anxious.        Objective:      Physical Exam   Constitutional: He is oriented to person, place, and time. He appears well-developed and well-nourished. No distress.   HENT:   Head: Normocephalic and  atraumatic.   Pulmonary/Chest: Effort normal. No respiratory distress.   Musculoskeletal: Normal range of motion. He exhibits no edema or tenderness.        Legs:       Feet:    Neurological: He is alert and oriented to person, place, and time.   Skin: Skin is warm and dry. No rash noted. He is not diaphoretic. No erythema.   Psychiatric: He has a normal mood and affect. His behavior is normal. Judgment and thought content normal.   Nursing note and vitals reviewed.      ..  Hemoglobin A1C   Date Value Ref Range Status   10/20/2017 4.8 4.0 - 5.6 % Final     Comment:     According to ADA guidelines, hemoglobin A1c <7.0% represents  optimal control in non-pregnant diabetic patients. Different  metrics may apply to specific patient populations.   Standards of Medical Care in Diabetes-2016.  For the purpose of screening for the presence of diabetes:  <5.7%     Consistent with the absence of diabetes  5.7-6.4%  Consistent with increasing risk for diabetes   (prediabetes)  >or=6.5%  Consistent with diabetes  Currently, no consensus exists for use of hemoglobin A1c  for diagnosis of diabetes for children.  This Hemoglobin A1c assay has significant interference with fetal   hemoglobin   (HbF). The results are invalid for patients with abnormal amounts of   HbF,   including those with known Hereditary Persistence   of Fetal Hemoglobin. Heterozygous hemoglobin variants (HbAS, HbAC,   HbAD, HbAE, HbA2) do not significantly interfere with this assay;   however, presence of multiple variants in a sample may impact the %   interference.     03/10/2017 4.7 4.5 - 6.2 % Final     Comment:     According to ADA guidelines, hemoglobin A1C <7.0% represents  optimal control in non-pregnant diabetic patients.  Different  metrics may apply to specific populations.   Standards of Medical Care in Diabetes - 2016.  For the purpose of screening for the presence of diabetes:  <5.7%     Consistent with the absence of diabetes  5.7-6.4%   "Consistent with increasing risk for diabetes   (prediabetes)  >or=6.5%  Consistent with diabetes  Currently no consensus exists for use of hemoglobin A1C  for diagnosis of diabetes for children.     02/04/2016 5.1 4.5 - 6.2 % Final     Assessment:       1. Toe ulcer, left, limited to breakdown of skin    2. Hyperkeratosis    3. Decubitus ulcer of sacral region, unstageable    4. Decubitus ulcer of left buttock, unstageable    5. Type 2 diabetes mellitus with complication, without long-term current use of insulin        Plan:           Keep appointment with Dr. Cardoso for evaluation and management of hyperkeratosis.  Bathe legs daily with dove soap and water.  Triamcinolone cream to lower legs twice daily.  Spectazole cream to feet daily.  Medihoney gel to right second toe ulcer, cover with gauze and secure with roll gauze.  Place cotton in between toes.  Cover legs with gauze and roll gauze.  Compression with two 4" ace wraps bilateral lower legs.    [facility] notified of orders via EPIC fax.  Skilled nurse visit-3 x weekly.  Elevate legs when seated.  Do not keep legs dependent.  Return to this clinic as needed.      Right shin        Right second toe                                                                                                                                                                              "

## 2018-02-01 NOTE — TELEPHONE ENCOUNTER
----- Message from Maria Fernanda Schneider sent at 2/1/2018 11:42 AM CST -----  Contact: Hyac[facility] health [phone] ext [phone]  Hyacinth would like to speak with the nurse regarding the pts diabetes,please call

## 2018-02-01 NOTE — PATIENT INSTRUCTIONS
"Bathe legs daily with dove soap and water.  Triamcinolone cream to lower legs twice daily.  Spectazole cream to feet daily.  Medihoney gel to right second toe ulcer, cover with gauze and secure with roll gauze.  Place cotton in between toes.  Cover legs with gauze and roll gauze.  Compression with two 4" ace wraps bilateral lower legs.    Elevate legs when seated. Do not keep legs dependent.  Keep appointment with Dr. Cardoso as scheduled for 2/26/18.  "

## 2018-02-02 ENCOUNTER — OUTPATIENT CASE MANAGEMENT (OUTPATIENT)
Dept: ADMINISTRATIVE | Facility: OTHER | Age: 83
End: 2018-02-02

## 2018-02-02 RX ORDER — HYDROCODONE BITARTRATE AND ACETAMINOPHEN 7.5; 325 MG/1; MG/1
1 TABLET ORAL EVERY 6 HOURS PRN
Qty: 30 TABLET | Refills: 0 | Status: SHIPPED | OUTPATIENT
Start: 2018-02-02 | End: 2018-02-26 | Stop reason: SDUPTHER

## 2018-02-02 NOTE — TELEPHONE ENCOUNTER
----- Message from Gabriela Melgoza sent at 2/1/2018 11:25 AM CST -----  Doctor appointment and lab have been scheduled.  Please link lab orders to the lab appointment.  Date of doctor appointment:  4/27/18  Physical or EP:  Physical  Date of lab appointment:  4/24/18

## 2018-02-02 NOTE — PROGRESS NOTES
Noted that pt does not have a working glucometer at home. Reviewed chart and noted that order was sent to pharmacy in November. All placed to Ronal, pt's daughter. She states pt has a glucometer at home and she is getting error messages. She spoke to nurse with [facility] health and was told they would try to get one for pt. Call placed to [facility]. They will need a DWO form completed for the meter, test strips and lancets. She will sent to Dr. Garcia electronically to have completed. She cannot say whether pt with have a copayment. Ronal states she thought Medicare was sending one through the mail. Explained to her that the pharmacy will file a claim with Medicare. Will continue to follow.     Interventions performed:   Contact [facility] pharmacy about glucometer  Send message to Dr. Garcia about the DWO forms  Review s/s hypo/hyperglycemia    Plan:   Follow up on glucometer  Continue education

## 2018-02-07 ENCOUNTER — TELEPHONE (OUTPATIENT)
Dept: INTERNAL MEDICINE | Facility: CLINIC | Age: 83
End: 2018-02-07

## 2018-02-15 ENCOUNTER — OUTPATIENT CASE MANAGEMENT (OUTPATIENT)
Dept: ADMINISTRATIVE | Facility: OTHER | Age: 83
End: 2018-02-15

## 2018-02-15 NOTE — PROGRESS NOTES
2-15-18--1st Attempt to follow-up for Outpatient Care Management; Called daughter-Ronal Asencio [phone]-No answer, unable to leave a message. I called [phone], spoke with a gentleman named Delonte- he will ask Ronal to call me when he sees her later today. I called [facility] and spoke with Ligia ([phone]). Ligia reports that they only received a form for the Lancets. [employer] is faxing additional forms for the glucometer and for the strips to . 3 forms needed to be filled out for the glucometer, lancets and strips. Message sent to  to alert that form needs to be filled for glucometer and strips, the pharmacy already has form for lancets.  Coreen WASHBURN CCM      Interventions performed:   Message left for Ronal to call back  Message  with instructions for filling out forms from pharmacy  Contact [facility] about glucometer      Plan:   Follow up on glucometer, lancets and strips  Continue education      Coreen WASHBURN CCM

## 2018-02-21 ENCOUNTER — TELEPHONE (OUTPATIENT)
Dept: INTERNAL MEDICINE | Facility: CLINIC | Age: 83
End: 2018-02-21

## 2018-02-21 NOTE — TELEPHONE ENCOUNTER
----- Message from Argentina Albarran sent at 2/20/2018  3:46 PM CST -----  Contact: [facility] Hyacinth  [phone]  Nurse from [facility] wanted to f/u and ask if you want to order any labs since they are in the home drawing his PT/INR. His hx shows his last labs were drawn in 2017.

## 2018-02-22 ENCOUNTER — OUTPATIENT CASE MANAGEMENT (OUTPATIENT)
Dept: ADMINISTRATIVE | Facility: OTHER | Age: 83
End: 2018-02-22

## 2018-02-26 ENCOUNTER — OFFICE VISIT (OUTPATIENT)
Dept: DERMATOLOGY | Facility: CLINIC | Age: 83
End: 2018-02-26
Payer: MEDICARE

## 2018-02-26 VITALS — BODY MASS INDEX: 26.43 KG/M2 | WEIGHT: 179 LBS

## 2018-02-26 DIAGNOSIS — L08.0 PYODERMA: Primary | ICD-10-CM

## 2018-02-26 DIAGNOSIS — I89.0 ELEPHANTIASIS NOSTRA VERRUCOSA: ICD-10-CM

## 2018-02-26 PROCEDURE — 1159F MED LIST DOCD IN RCRD: CPT | Mod: ,,, | Performed by: DERMATOLOGY

## 2018-02-26 PROCEDURE — 99212 OFFICE O/P EST SF 10 MIN: CPT | Mod: S$PBB,,, | Performed by: DERMATOLOGY

## 2018-02-26 PROCEDURE — 1126F AMNT PAIN NOTED NONE PRSNT: CPT | Mod: ,,, | Performed by: DERMATOLOGY

## 2018-02-26 PROCEDURE — 87186 SC STD MICRODIL/AGAR DIL: CPT | Mod: 59

## 2018-02-26 PROCEDURE — 99213 OFFICE O/P EST LOW 20 MIN: CPT | Mod: PBBFAC,PO | Performed by: DERMATOLOGY

## 2018-02-26 PROCEDURE — 87070 CULTURE OTHR SPECIMN AEROBIC: CPT

## 2018-02-26 PROCEDURE — 99999 PR PBB SHADOW E&M-EST. PATIENT-LVL III: CPT | Mod: PBBFAC,,, | Performed by: DERMATOLOGY

## 2018-02-26 PROCEDURE — 87077 CULTURE AEROBIC IDENTIFY: CPT | Mod: 59

## 2018-02-26 RX ORDER — HYDROCODONE BITARTRATE AND ACETAMINOPHEN 7.5; 325 MG/1; MG/1
1 TABLET ORAL EVERY 6 HOURS PRN
Qty: 30 TABLET | Refills: 0 | Status: SHIPPED | OUTPATIENT
Start: 2018-03-26 | End: 2018-04-27 | Stop reason: SDUPTHER

## 2018-02-26 RX ORDER — HYDROCODONE BITARTRATE AND ACETAMINOPHEN 7.5; 325 MG/1; MG/1
1 TABLET ORAL EVERY 6 HOURS PRN
Qty: 30 TABLET | Refills: 0 | Status: SHIPPED | OUTPATIENT
Start: 2018-04-23 | End: 2018-08-18 | Stop reason: SDUPTHER

## 2018-02-26 RX ORDER — HYDROCODONE BITARTRATE AND ACETAMINOPHEN 7.5; 325 MG/1; MG/1
1 TABLET ORAL EVERY 6 HOURS PRN
Qty: 30 TABLET | Refills: 0 | Status: SHIPPED | OUTPATIENT
Start: 2018-02-26 | End: 2018-04-27 | Stop reason: SDUPTHER

## 2018-02-26 NOTE — PROGRESS NOTES
"  Subjective:       Patient ID:  Chris Asencio is a 86 y.o. male who presents for   Chief Complaint   Patient presents with    Lesion     left leg     See previous notes Dr Malloy, referred for dry skin on thighs per Fela Rincon:  "Keep appointment with Dr. Cardoso for evaluation and management of hyperkeratosis.  Bathe legs daily with dove soap and water.  Triamcinolone cream to lower legs twice daily.  Spectazole cream to feet daily.  Medihoney gel to right second toe ulcer, cover with gauze and secure with roll gauze.  Place cotton in between toes.  Cover legs with gauze and roll gauze.  Compression with two 4" ace wraps bilateral lower legs.    [facility] notified of orders via EPIC fax.  Skilled nurse visit-3 x weekly.  Elevate legs when seated.  Do not keep legs dependent."    Also has some drainage from left post sup calf for over a week somewhat tender.     Photos of legs reviewed.         Review of Systems   Constitutional: Negative for fever.   Skin: Positive for rash. Negative for itching.   Hematologic/Lymphatic: Bruises/bleeds easily.        Objective:    Physical Exam   Skin:   Areas Examined (abnormalities noted in diagram):   RLE Inspected  LLE Inspection Performed              Diagram Legend     Erythematous scaling macule/papule c/w actinic keratosis       Vascular papule c/w angioma      Pigmented verrucoid papule/plaque c/w seborrheic keratosis      Yellow umbilicated papule c/w sebaceous hyperplasia      Irregularly shaped tan macule c/w lentigo     1-2 mm smooth white papules consistent with Milia      Movable subcutaneous cyst with punctum c/w epidermal inclusion cyst      Subcutaneous movable cyst c/w pilar cyst      Firm pink to brown papule c/w dermatofibroma      Pedunculated fleshy papule(s) c/w skin tag(s)      Evenly pigmented macule c/w junctional nevus     Mildly variegated pigmented, slightly irregular-bordered macule c/w mildly atypical nevus      Flesh colored to evenly " pigmented papule c/w intradermal nevus       Pink pearly papule/plaque c/w basal cell carcinoma      Erythematous hyperkeratotic cursted plaque c/w SCC      Surgical scar with no sign of skin cancer recurrence      Open and closed comedones      Inflammatory papules and pustules      Verrucoid papule consistent consistent with wart     Erythematous eczematous patches and plaques     Dystrophic onycholytic nail with subungual debris c/w onychomycosis     Umbilicated papule    Erythematous-base heme-crusted tan verrucoid plaque consistent with inflamed seborrheic keratosis     Erythematous Silvery Scaling Plaque c/w Psoriasis     See annotation      Assessment / Plan:        Pyoderma  -     Aerobic culture    Elephantiasis nostra verrucosa/ ichthyosis  Continue with PT  Consider unna boots  hibiclens weekly  Am lactin for ichthyosis on thighs

## 2018-02-26 NOTE — Clinical Note
I treated Mr Rincon for his infection, his daughter requested a trial of unna boots to see if they helped.  He needs follow up in wound clinic to monitor to see type of dressing to use.  thanks

## 2018-02-26 NOTE — TELEPHONE ENCOUNTER
Daughter walked in clinic with pt requesting pain med refill and to advise that pt has been experiencing several days of cough and congestion and they are wanting to know what to give him.    Please advise as they are sitting in the lobby.    Thanks.

## 2018-02-26 NOTE — TELEPHONE ENCOUNTER
Refills provided to pt's daughter.    Dr. Garcia advised that pt take sugar free robitussin.    Verbalized understanding.

## 2018-02-28 ENCOUNTER — OUTPATIENT CASE MANAGEMENT (OUTPATIENT)
Dept: ADMINISTRATIVE | Facility: OTHER | Age: 83
End: 2018-02-28

## 2018-03-01 NOTE — PROGRESS NOTES
Spoke with pt's daughter, Ronal for follow up. Ronal states pt has not received a new glucometer. She states home health is monitoring his blood sugars when they come and they have stayed in the low 100s. Ronal states she is not sure how because pt has been eating whatever he wants. Ronal states she thought the glucometer was going to be mailed to her home and she thinks someone could have stolen it from the porch. Explained to her that if it is coming from [employer], she will have to . She states she has not contacted [employer] again and has not heard from them. Call placed to [facility] on [address] in [place], which is now [place]. Pharmacist states that since they are now [facility], they had to send a new DWO form to the PCP. He states they did this last week and have not received the completed form. Message sent to Dr. Garcia asking if she received the form.       Plan:   Follow up on glucometer (DWO form)  Continue education

## 2018-03-02 LAB
BACTERIA SPEC AEROBE CULT: NORMAL
BACTERIA SPEC AEROBE CULT: NORMAL

## 2018-03-05 ENCOUNTER — TELEPHONE (OUTPATIENT)
Dept: INTERNAL MEDICINE | Facility: CLINIC | Age: 83
End: 2018-03-05

## 2018-03-05 ENCOUNTER — TELEPHONE (OUTPATIENT)
Dept: DERMATOLOGY | Facility: CLINIC | Age: 83
End: 2018-03-05

## 2018-03-05 DIAGNOSIS — L08.0 PYODERMA: Primary | ICD-10-CM

## 2018-03-05 RX ORDER — GLIPIZIDE 2.5 MG/1
TABLET, EXTENDED RELEASE ORAL
Qty: 30 TABLET | Refills: 3 | Status: SHIPPED | OUTPATIENT
Start: 2018-03-05 | End: 2018-08-15 | Stop reason: SDUPTHER

## 2018-03-05 RX ORDER — GENTAMICIN SULFATE 1 MG/G
CREAM TOPICAL
Qty: 30 G | Refills: 3 | Status: SHIPPED | OUTPATIENT
Start: 2018-03-05 | End: 2018-05-18

## 2018-03-05 RX ORDER — CLINDAMYCIN HYDROCHLORIDE 300 MG/1
CAPSULE ORAL
Qty: 40 CAPSULE | Refills: 0 | Status: SHIPPED | OUTPATIENT
Start: 2018-03-05 | End: 2018-12-26

## 2018-03-05 NOTE — TELEPHONE ENCOUNTER
Explained the culture grew 2 bacteria, many mrsa and few gram negatives.  Called in clindamycin 300mg 2 bid for 10 days, she will contact coumadin clinic to let them know about rx.  Also called in gentamycin cream to use on the area left leg with bandage changes.

## 2018-03-05 NOTE — TELEPHONE ENCOUNTER
----- Message from Tata Forman RN sent at 2/28/2018  3:46 PM CST -----  Dr. Garcia,   Mr. [place] has not received the glucometer yet. I called [facility], which is now [employer], and they said they sent a new DWO to you last week. They are waiting for it to be completed and returned. Do you know if your office received it?     Thanks,     Tata Forman RN, [facility]  Outpatient Case Management  [facility]

## 2018-03-05 NOTE — TELEPHONE ENCOUNTER
----- Message from Niki Isaac sent at 3/5/2018 11:45 AM CST -----  Contact: home health nurse   Please home health  on above patient at number and ext in message  ext#[phone]- thanks

## 2018-03-06 ENCOUNTER — TELEPHONE (OUTPATIENT)
Dept: DERMATOLOGY | Facility: CLINIC | Age: 83
End: 2018-03-06

## 2018-03-06 ENCOUNTER — TELEPHONE (OUTPATIENT)
Dept: WOUND CARE | Facility: CLINIC | Age: 83
End: 2018-03-06

## 2018-03-06 RX ORDER — WARFARIN SODIUM 5 MG/1
TABLET ORAL
Qty: 30 TABLET | Refills: 3 | Status: SHIPPED | OUTPATIENT
Start: 2018-03-06 | End: 2019-07-29 | Stop reason: SDUPTHER

## 2018-03-06 NOTE — TELEPHONE ENCOUNTER
----- Message from Olivia Cardoso MD sent at 3/6/2018 11:31 AM CST -----  Contact: [facility]   Only aware of one type, what are the choices?  ----- Message -----  From: Niki Copeland MA  Sent: 3/6/2018  11:08 AM  To: MD Dr. Walker Fink there asking what kind of UNNA boots to get Hyacinth want's to make sure that she have the right order  ----- Message -----  From: Gena Glynn  Sent: 3/6/2018  10:05 AM  To: Walker ALEXANDRE Staff    [facility]-pt- Hyacinth is calling to speak with the nurse they need to make sure the right unna boots for the pt they need to make sure they have the right order Hyacinth left a message on yesterday and hasn't heard back from anyone can you please call Hyacinth at [phone] ext [phone]    POLO

## 2018-03-06 NOTE — TELEPHONE ENCOUNTER
----- Message from Juliocesar Trevizo sent at 3/6/2018 11:41 AM CST -----  Contact: Chitra with [facility]  Caller has questions regarding pt wound care orders. Caller said pt went to Dermatology and has new orders. Caller wants to know if they consulted with office or what to do. Please call regarding this at  [phone]

## 2018-03-07 ENCOUNTER — TELEPHONE (OUTPATIENT)
Dept: DERMATOLOGY | Facility: CLINIC | Age: 83
End: 2018-03-07

## 2018-03-07 ENCOUNTER — TELEPHONE (OUTPATIENT)
Dept: INTERNAL MEDICINE | Facility: CLINIC | Age: 83
End: 2018-03-07

## 2018-03-07 RX ORDER — SULFAMETHOXAZOLE AND TRIMETHOPRIM 800; 160 MG/1; MG/1
1 TABLET ORAL 2 TIMES DAILY
Qty: 14 TABLET | Refills: 0 | Status: SHIPPED | OUTPATIENT
Start: 2018-03-07 | End: 2018-03-14

## 2018-03-07 NOTE — TELEPHONE ENCOUNTER
We can try Bactrim.  However, we will need home health to check PT/INR more frequently.  Please asked home health to check INR 2 days after patient starts Bactrim.

## 2018-03-07 NOTE — TELEPHONE ENCOUNTER
----- Message from Gena Glynn sent at 3/7/2018 10:21 AM CST -----  Contact: Chitra Buenrostro is calling to speak with the nurse Dr Cardoso ordered Clindamycin pts family doesn't want to give the medication because of his blood pressure drops they haven't started the medication yet can you please call Chitra [phone]    POLO

## 2018-03-07 NOTE — TELEPHONE ENCOUNTER
----- Message from Olivia Cardoso MD sent at 3/7/2018 12:12 PM CST -----  Contact: Chitra   Please see culture results, what can you suggest for him to use with current meds and medical issues?    ----- Message -----  From: Niki Copeland MA  Sent: 3/7/2018  10:58 AM  To: MD Dr. Walker Fink stated that the family of Mr. Perez don't want him to use clindamycin because of his blood pressure  ----- Message -----  From: Gena Glynn  Sent: 3/7/2018  10:21 AM  To: Walker Buenrostro is calling to speak with the nurse Dr Cardoso ordered Clindamycin pts family doesn't want to give the medication because of his blood pressure drops they haven't started the medication yet can you please call Chitra [phone]    POLO

## 2018-03-08 ENCOUNTER — TELEPHONE (OUTPATIENT)
Dept: INTERNAL MEDICINE | Facility: CLINIC | Age: 83
End: 2018-03-08

## 2018-03-08 ENCOUNTER — OUTPATIENT CASE MANAGEMENT (OUTPATIENT)
Dept: ADMINISTRATIVE | Facility: OTHER | Age: 83
End: 2018-03-08

## 2018-03-08 NOTE — PROGRESS NOTES
Spoke with Ronal. She states she received a call from Chitra at [facility] and was told to start patients antibiotics. Ronal gave pt 1 clindamycin. Noted on chart that Bactrim was ordered and sent to pt's pharmacy. Ronal states no one called her to inform her of this. She also states she has not been contacted about the glucometer. She will send her son to the pharmacy to  the Rx and check on glucometer    Interventions performed:   Send message to Dr. Garcia regarding antibiotics    Plan:   Follow up tomorrow on outcome of message to Dr. Garcia  Plan to dis-enroll once above resolved

## 2018-03-08 NOTE — TELEPHONE ENCOUNTER
----- Message from Tata Forman RN sent at 3/8/2018  4:10 PM CST -----  Genaro Ortize received a call from the home health nurse telling her to start the antibiotics. She gave him the clindamycin. She was not informed that you ordered the Bactrim. She states no one called to tell her about the new order. Please contact Ronal to advise.     Thanks,     Tata Forman RN, [facility]  Outpatient Case Management  [facility]

## 2018-03-15 ENCOUNTER — TELEPHONE (OUTPATIENT)
Dept: INTERNAL MEDICINE | Facility: CLINIC | Age: 83
End: 2018-03-15

## 2018-03-15 NOTE — TELEPHONE ENCOUNTER
----- Message from Tata Mejia sent at 3/14/2018 12:06 PM CDT -----  Contact: Hyacinth Tripathi [id number]- [phone] ext   Sent over lab results INR was 2.3 on Monday and it was faxed and patient is on a new antibiotic. So checking to see when Dr rizo's the next one done.

## 2018-03-16 ENCOUNTER — OUTPATIENT CASE MANAGEMENT (OUTPATIENT)
Dept: ADMINISTRATIVE | Facility: OTHER | Age: 83
End: 2018-03-16

## 2018-03-16 NOTE — PROGRESS NOTES
Summary:   Call placed to Ronal. She states pt has not received glucometer yet. Pt was contacted regarding antibiotics and Ronal states it was worked out.      Interventions:   Call placed to [facility] pharmacy. They did not receive the DWO form  Message sent to Dr. Garcia's office to have DWO form faxed to [phone]      Plan:  Follow up on glucometer  Dis-enroll from [facility]

## 2018-03-26 ENCOUNTER — OUTPATIENT CASE MANAGEMENT (OUTPATIENT)
Dept: ADMINISTRATIVE | Facility: OTHER | Age: 83
End: 2018-03-26

## 2018-03-27 ENCOUNTER — TELEPHONE (OUTPATIENT)
Dept: INTERNAL MEDICINE | Facility: CLINIC | Age: 83
End: 2018-03-27

## 2018-03-27 RX ORDER — BENZONATATE 100 MG/1
100 CAPSULE ORAL 3 TIMES DAILY PRN
Qty: 30 CAPSULE | Refills: 1 | Status: SHIPPED | OUTPATIENT
Start: 2018-03-27 | End: 2018-04-06

## 2018-03-27 RX ORDER — AMOXICILLIN 500 MG/1
500 CAPSULE ORAL EVERY 12 HOURS
Qty: 14 CAPSULE | Refills: 0 | Status: SHIPPED | OUTPATIENT
Start: 2018-03-27 | End: 2018-12-26

## 2018-03-27 NOTE — TELEPHONE ENCOUNTER
Called to speak with Rnoal in regards to her concerns and she states patient has a runny nose and she states its constantly running  and she gave her some Robitussin for Diabetics and he is now coughing with mucus in his chest confirmed appointment for the Annual and she is requesting a Rx for the coughing.

## 2018-03-27 NOTE — TELEPHONE ENCOUNTER
Please informed patient that prescription for Tessalon Perles has been sent to the pharmacy electronically for cough and a prescription for amoxicillin..

## 2018-03-27 NOTE — TELEPHONE ENCOUNTER
----- Message from Argentina Albarran sent at 3/27/2018 10:38 AM CDT -----  Contact: Ronal/ daughter [phone]  Pt's daughter called to ask if you can order meds for a cough/ runny nose. He took Robitussin DM and it didn't help much. Please call Ronal to let her know when meds have been ordered.    [facility] [place] [phone]

## 2018-03-27 NOTE — PROGRESS NOTES
Summary:   Call placed to [facility] pharmacy in [facility]. Spoke to Tau. She states the DWO form has been faxed to Dr. Garcia's office several times. The form needs to be completed for the glucometer, test strips and lancets. Call placed to pt's daughter also. She has not been contacted by Dr. Garcia's office      Interventions:   Called [facility] regarding glucometer  Sent message to Dr. Mcdermott regarding difficulty       Plan:   Follow up on glucometer  Dis-enroll from opcm once pt receives glucometer.

## 2018-03-29 NOTE — TELEPHONE ENCOUNTER
Called and spoke with Ronal and she understood the Rx and she states he has started the Rx as of Yesterday. Termed the call

## 2018-04-03 ENCOUNTER — OUTPATIENT CASE MANAGEMENT (OUTPATIENT)
Dept: ADMINISTRATIVE | Facility: OTHER | Age: 83
End: 2018-04-03

## 2018-04-03 ENCOUNTER — TELEPHONE (OUTPATIENT)
Dept: INTERNAL MEDICINE | Facility: CLINIC | Age: 83
End: 2018-04-03

## 2018-04-03 DIAGNOSIS — E11.8 TYPE 2 DIABETES MELLITUS WITH COMPLICATION, WITHOUT LONG-TERM CURRENT USE OF INSULIN: Primary | ICD-10-CM

## 2018-04-03 RX ORDER — LANCETS
1 EACH MISCELLANEOUS DAILY
Qty: 100 EACH | Refills: 4 | Status: SHIPPED | OUTPATIENT
Start: 2018-04-03

## 2018-04-03 RX ORDER — INSULIN PUMP SYRINGE, 3 ML
EACH MISCELLANEOUS
Qty: 1 EACH | Refills: 0 | Status: SHIPPED | OUTPATIENT
Start: 2018-04-03 | End: 2019-04-03

## 2018-04-03 NOTE — TELEPHONE ENCOUNTER
"Tata B- Please let patient know that per [facility] pharmacy, his testing supplies are covered by medicare part B. All script for testing supplies sent to [facility] on [address] with message in pharmacy notes to fill by "Brand preferred by insurance provider (medicare part B)". Could you follow-up with patient's pharmacy to make sure everything is processed accordingly? If they still won't process, please recommend to him that we can fill at the [facility] pharmacy.    Thanks,  KJ    ----- Message -----   From: Seema Dave, PharmD   Sent: 4/2/2018   6:39 PM   To: Alesha Barahona, PharmD, *   Subject: Glucose meter not covered                         Patient's meter and testing supplies will be covered under Medicare Part B. Please send a new script to his preferred pharmacy or mail order pharmacy. They will be able to bill Medicare Part B for the patient's testing supplies. Thanks!   "

## 2018-04-03 NOTE — PROGRESS NOTES
Summary:   Call placed to [facility]. DWO form received and glucometer ready for . Call placed to Ronal. She states no one contacted her to let her know if was ready. She will  today. She denies any other needs.       Interventions:   Call placed to [facility] pharmacy  Message sent to Dr. IRENE  Dis-enrolled from OPCM       Plan:   None

## 2018-04-04 ENCOUNTER — TELEPHONE (OUTPATIENT)
Dept: DERMATOLOGY | Facility: CLINIC | Age: 83
End: 2018-04-04

## 2018-04-04 NOTE — TELEPHONE ENCOUNTER
----- Message from Gena Glynn sent at 4/4/2018 10:11 AM CDT -----  Contact: [facility]-pt- Trang is calling to speak with the nurse to see if Niki received the paper work can you please call Trang at [phone]    POLO

## 2018-04-12 ENCOUNTER — TELEPHONE (OUTPATIENT)
Dept: DERMATOLOGY | Facility: CLINIC | Age: 83
End: 2018-04-12

## 2018-04-12 NOTE — TELEPHONE ENCOUNTER
----- Message from Zuri Marmolejo sent at 4/12/2018  9:19 AM CDT -----  Contact: [facility]/Trang at [phone]  [facility] was sent to  you to get Mcr to  sign. Has not received it yet.  Please call today with update.

## 2018-04-13 ENCOUNTER — TELEPHONE (OUTPATIENT)
Dept: WOUND CARE | Facility: CLINIC | Age: 83
End: 2018-04-13

## 2018-04-13 NOTE — TELEPHONE ENCOUNTER
----- Message from Kerry Rincon NP sent at 4/11/2018  4:55 PM CDT -----  Good afternoon!    I will forward this to my nurse so we can schedule him to come back in.  His problem is that no one in his household will take responsibility to help him clean his legs, apply creams and apply his stockings on a daily basis.  Without that he cannot stay well.    Thanks for your help!    Fela    ----- Message -----  From: Olivia Cardoso MD  Sent: 4/11/2018   4:47 PM  To: Kerry Rincon NP    I treated Mr Rincon for his infection, his daughter requested a trial of unna boots to see if they helped.  He needs follow up in wound clinic to monitor to see type of dressing to use.   thanks

## 2018-04-18 ENCOUNTER — OFFICE VISIT (OUTPATIENT)
Dept: WOUND CARE | Facility: CLINIC | Age: 83
End: 2018-04-18
Payer: MEDICARE

## 2018-04-18 VITALS
HEART RATE: 86 BPM | SYSTOLIC BLOOD PRESSURE: 111 MMHG | WEIGHT: 164.25 LBS | TEMPERATURE: 98 F | HEIGHT: 69 IN | DIASTOLIC BLOOD PRESSURE: 64 MMHG | BODY MASS INDEX: 24.33 KG/M2

## 2018-04-18 DIAGNOSIS — L85.9 HYPERKERATOSIS: ICD-10-CM

## 2018-04-18 DIAGNOSIS — B35.3 TINEA PEDIS OF BOTH FEET: ICD-10-CM

## 2018-04-18 DIAGNOSIS — L97.921 BILATERAL LEG ULCER, LIMITED TO BREAKDOWN OF SKIN: Primary | ICD-10-CM

## 2018-04-18 DIAGNOSIS — I87.2 VENOUS INSUFFICIENCY: ICD-10-CM

## 2018-04-18 DIAGNOSIS — L97.911 BILATERAL LEG ULCER, LIMITED TO BREAKDOWN OF SKIN: Primary | ICD-10-CM

## 2018-04-18 PROBLEM — L89.300 DECUBITUS ULCER OF BUTTOCK, UNSTAGEABLE: Status: RESOLVED | Noted: 2017-12-21 | Resolved: 2018-04-18

## 2018-04-18 PROBLEM — L97.521 TOE ULCER, LEFT, LIMITED TO BREAKDOWN OF SKIN: Status: RESOLVED | Noted: 2017-11-08 | Resolved: 2018-04-18

## 2018-04-18 PROBLEM — L89.150 DECUBITUS ULCER OF SACRAL REGION, UNSTAGEABLE: Status: RESOLVED | Noted: 2017-12-21 | Resolved: 2018-04-18

## 2018-04-18 PROCEDURE — 99999 PR PBB SHADOW E&M-EST. PATIENT-LVL V: CPT | Mod: PBBFAC,,, | Performed by: NURSE PRACTITIONER

## 2018-04-18 PROCEDURE — 29580 STRAPPING UNNA BOOT: CPT | Mod: 50,S$PBB,, | Performed by: NURSE PRACTITIONER

## 2018-04-18 PROCEDURE — 99215 OFFICE O/P EST HI 40 MIN: CPT | Mod: PBBFAC,25 | Performed by: NURSE PRACTITIONER

## 2018-04-18 PROCEDURE — 29580 STRAPPING UNNA BOOT: CPT | Mod: 50,PBBFAC | Performed by: NURSE PRACTITIONER

## 2018-04-18 PROCEDURE — 99212 OFFICE O/P EST SF 10 MIN: CPT | Mod: 25,S$PBB,, | Performed by: NURSE PRACTITIONER

## 2018-04-18 NOTE — PATIENT INSTRUCTIONS
Elevate legs as much as possible. Do not get the dressings wet and use cast covers for showering.  Should the dressing become wet, remove it, place a wet-to-dry dressing over the wound, cover with gauze and roll gauze and use ace wraps for compression and to secure bandages.  Notify home health as soon as possible to have a new dressing applied.      Patient must shower DAILY using a mild soap such as dove.  After showering apply amlactin to both thighs DAILY.

## 2018-04-18 NOTE — PROGRESS NOTES
Subjective:       Patient ID: Chris Asencio is a 86 y.o. male.    Chief Complaint: No chief complaint on file.    Wound Check     This patient is seen today for evaluation and management of ulcers to both lower legs.  He has Elephantiasis nostra verrucosa/ ichthyosis and was seen by Dr. Cardoso who prescribed amlactin cream for his thighs.  This is not being applied as ordered.    [facility] is seeing the patient.  He has zinc oxide wraps with kerlix an ace wraps to his leg.  He has a wound on both shins  He is afebrile.  He denies increased swelling, redness, or purulent drainage.  He does not have any pain.  His medical history is significant for type II diabetes.    Review of Systems   HENT: Negative for hearing loss, postnasal drip, rhinorrhea, sinus pressure, sneezing, tinnitus and trouble swallowing.    Eyes: Positive for visual disturbance (legally blind).   Respiratory: Positive for shortness of breath. Negative for apnea and wheezing.    Cardiovascular: Negative for palpitations and leg swelling.   Gastrointestinal: Negative for constipation and diarrhea.   Endocrine: Negative for cold intolerance, heat intolerance, polydipsia and polyuria.   Genitourinary: Negative for difficulty urinating, dysuria, frequency and hematuria.   Musculoskeletal: Negative for back pain.   Skin: Negative for wound.   Allergic/Immunologic: Negative for environmental allergies and food allergies.   Neurological: Negative for dizziness and light-headedness.   Hematological: Bruises/bleeds easily.   Psychiatric/Behavioral: Negative for confusion, decreased concentration, dysphoric mood and sleep disturbance. The patient is not nervous/anxious.        Objective:      Physical Exam   Constitutional: He is oriented to person, place, and time. He appears well-developed and well-nourished. No distress.   HENT:   Head: Normocephalic and atraumatic.   Pulmonary/Chest: Effort normal. No respiratory distress.   Musculoskeletal: Normal  range of motion. He exhibits no edema or tenderness.        Legs:  Neurological: He is alert and oriented to person, place, and time.   Skin: Skin is warm and dry. No rash noted. He is not diaphoretic. No erythema.   Psychiatric: He has a normal mood and affect. His behavior is normal. Judgment and thought content normal.   Nursing note and vitals reviewed.      ..  Hemoglobin A1C   Date Value Ref Range Status   10/20/2017 4.8 4.0 - 5.6 % Final     Comment:     According to ADA guidelines, hemoglobin A1c <7.0% represents  optimal control in non-pregnant diabetic patients. Different  metrics may apply to specific patient populations.   Standards of Medical Care in Diabetes-2016.  For the purpose of screening for the presence of diabetes:  <5.7%     Consistent with the absence of diabetes  5.7-6.4%  Consistent with increasing risk for diabetes   (prediabetes)  >or=6.5%  Consistent with diabetes  Currently, no consensus exists for use of hemoglobin A1c  for diagnosis of diabetes for children.  This Hemoglobin A1c assay has significant interference with fetal   hemoglobin   (HbF). The results are invalid for patients with abnormal amounts of   HbF,   including those with known Hereditary Persistence   of Fetal Hemoglobin. Heterozygous hemoglobin variants (HbAS, HbAC,   HbAD, HbAE, HbA2) do not significantly interfere with this assay;   however, presence of multiple variants in a sample may impact the %   interference.     03/10/2017 4.7 4.5 - 6.2 % Final     Comment:     According to ADA guidelines, hemoglobin A1C <7.0% represents  optimal control in non-pregnant diabetic patients.  Different  metrics may apply to specific populations.   Standards of Medical Care in Diabetes - 2016.  For the purpose of screening for the presence of diabetes:  <5.7%     Consistent with the absence of diabetes  5.7-6.4%  Consistent with increasing risk for diabetes   (prediabetes)  >or=6.5%  Consistent with diabetes  Currently no  consensus exists for use of hemoglobin A1C  for diagnosis of diabetes for children.     02/04/2016 5.1 4.5 - 6.2 % Aditya Perez was seen in the clinic room and placed in the supine position on the treatment table.  The legs were cleansed with Easi-clense sponges and dried thoroughly.  Eucerin cream was applied to the lower legs. The patient's feet were positioned at a 90 degree angle.  The coflex with zinc oxide was applied per package instructions.  A two layered application was performed using a spiral technique beginning with the foam layer followed by the cohesive bandage avoiding creases or folds.  The wraps were started behind the first metatarsal and ended below the tibial tubercle of the knee.  There was overlap of each turn half the width of the previous turn.  The compression wrap will be changed every 2-3 days.    Assessment:       1. Bilateral leg ulcer, limited to breakdown of skin    2. Hyperkeratosis    3. Tinea pedis of both feet    4. Venous insufficiency        Plan:           Coflex compression wraps bilateral lower legs as detailed above.  Amlactin to bilateral thighs as directed.  Patient was warned not to get the dressings wet and to use cast covers for showering.  Should the dressing become wet, he is to remove it, place a wet-to-dry dressing over the wound, cover with gauze and roll gauze and use ace wraps for compression and to secure bandages.  He should then notify home health as soon as possible to have a new dressing applied.  [facility] notified of orders via [facility] fax.  Skilled nurse visit-3 x weekly.  Elevate legs when seated.  Do not keep legs dependent.  Return to this clinic as needed.    Home health orders:  Bathe legs with a mild soap such as dove and dry thoroughly.  Apply amlactin to bilateral thighs.  Apply triamcinolone cream to lower legs.  Apply spectazole cream to feet.  Apply hydrofiber to bilateral leg ulcers.  Pad shins with ABD pads.  Coflex compression  wrap with zinc oxide bilateral lower legs.  Change dressings three times weekly.          Right shin    Left shin

## 2018-04-20 ENCOUNTER — TELEPHONE (OUTPATIENT)
Dept: WOUND CARE | Facility: CLINIC | Age: 83
End: 2018-04-20

## 2018-04-20 NOTE — TELEPHONE ENCOUNTER
----- Message from Sara Cruz LPN sent at 4/20/2018 12:08 PM CDT -----  Contact: Hyacinth with [facility]  Hyacinth with [facility] agency needs to speak with you regarding placing another type of compression wrap on Mr. Asencio - needs a change in wound care orders - please call at number listed below  Sara    ----- Message -----  From: Juliocesar Trevizo  Sent: 4/20/2018  12:02 PM  To: [facility] Staff    Caller said they don't have the coflex zinc compression wrap. Caller said they don't have it in stock and want to know if this can be changed. Please call regarding this at [phone]

## 2018-04-27 ENCOUNTER — OFFICE VISIT (OUTPATIENT)
Dept: INTERNAL MEDICINE | Facility: CLINIC | Age: 83
End: 2018-04-27
Payer: MEDICARE

## 2018-04-27 ENCOUNTER — TELEPHONE (OUTPATIENT)
Dept: INTERNAL MEDICINE | Facility: CLINIC | Age: 83
End: 2018-04-27

## 2018-04-27 VITALS
BODY MASS INDEX: 23.9 KG/M2 | TEMPERATURE: 98 F | RESPIRATION RATE: 14 BRPM | SYSTOLIC BLOOD PRESSURE: 89 MMHG | DIASTOLIC BLOOD PRESSURE: 41 MMHG | HEIGHT: 69 IN | WEIGHT: 161.38 LBS | HEART RATE: 62 BPM

## 2018-04-27 DIAGNOSIS — I87.2 VENOUS INSUFFICIENCY: ICD-10-CM

## 2018-04-27 DIAGNOSIS — E11.51 DIABETES MELLITUS WITH PERIPHERAL CIRCULATORY DISORDER: ICD-10-CM

## 2018-04-27 DIAGNOSIS — I48.20 CHRONIC ATRIAL FIBRILLATION: Primary | ICD-10-CM

## 2018-04-27 PROCEDURE — 99213 OFFICE O/P EST LOW 20 MIN: CPT | Mod: PBBFAC,PO | Performed by: INTERNAL MEDICINE

## 2018-04-27 PROCEDURE — 99214 OFFICE O/P EST MOD 30 MIN: CPT | Mod: S$PBB,,, | Performed by: INTERNAL MEDICINE

## 2018-04-27 PROCEDURE — 99999 PR PBB SHADOW E&M-EST. PATIENT-LVL III: CPT | Mod: PBBFAC,,, | Performed by: INTERNAL MEDICINE

## 2018-04-27 RX ORDER — HYDROCODONE BITARTRATE AND ACETAMINOPHEN 7.5; 325 MG/1; MG/1
1 TABLET ORAL EVERY 6 HOURS PRN
Qty: 30 TABLET | Refills: 0 | Status: SHIPPED | OUTPATIENT
Start: 2018-05-21 | End: 2018-05-23 | Stop reason: SDUPTHER

## 2018-04-27 RX ORDER — HYDROCODONE BITARTRATE AND ACETAMINOPHEN 7.5; 325 MG/1; MG/1
1 TABLET ORAL EVERY 6 HOURS PRN
Qty: 30 TABLET | Refills: 0 | Status: SHIPPED | OUTPATIENT
Start: 2018-06-18 | End: 2018-06-14 | Stop reason: SDUPTHER

## 2018-04-27 NOTE — PROGRESS NOTES
CC: Annual review of chronic medical problems  HPI:  The patient is a 87 y.o. old female who presents to the office for annual review of chronic medical problems.    PAST MEDICAL HISTORY  Past Medical History:   Diagnosis Date    *Atrial fibrillation     Diabetes mellitus type II     Glaucoma     Hyperlipidemia     Hypertension     Kyphosis        SURGICAL HISTORY:  Past Surgical History:   Procedure Laterality Date    CHOLECYSTECTOMY           MEDS:  Medcard reviewed and updated    ALLERGIES: Allergy Card reviewed and updated    SOCIAL HISTORY:   The patient is a nonsmoker, denies alcohol or illicit drug use.    ROS:  GENERAL: No fever, chills, fatigability or weight loss.  SKIN: Positive dry skin.  HEAD: No headaches or recent head trauma.  EYES: No vision.  EARS: Denies ear pain, discharge or vertigo.  NOSE: No epistaxis or postnasal drip.  MOUTH & THROAT: No hoarseness or change in voice.   NODES: Denies swollen glands.  CHEST: Denies shortness of breath or wheezing.  Occasional cough without sputum production.  CARDIOVASCULAR: Denies chest pain or palpitations.  ABDOMEN: Appetite fine. Denies diarrhea, abdominal pain or constipation.  URINARY: No dysuria.  MUSCULOSKELETAL: No joint stiffness or swelling. Positive back pain.  NEUROLOGIC: No history of seizures.  ENDOCRINE: Denies polyuria or polydipsia.  PSYCHIATRIC: Denies mood swings, depression, anxiety, homicidal or suicidal thoughts.    SCREENINGS:  Last cholesterol: 2017  Last colonoscopy: unknown  Last tetanus: unknown  Last Pneumovax: 2017  Last eye exam: unknown  Last PSA: unknown    PE:   Vitals:  Vitals:    04/27/18 1452   BP: (!) 89/41   Pulse: 62   Resp: 14   Temp: 98.1 °F (36.7 °C)       APPEARANCE: Well nourished, well developed, in no acute distress.      EARS: TM's intact. No retraction or perforation.    NOSE: Mucosa pink. Airway clear.  MOUTH & THROAT: No tonsillar enlargement. No pharyngeal erythema or exudate. No stridor.  Poor  dentition.  NECK: Supple, no thyromegaly.  CHEST: Lungs clear to auscultation with unlabored respirations.  CARDIOVASCULAR: Irregularly irregular S1, S2. No murmurs. No carotid bruits. No pedal edema.  ABDOMEN: Bowel sounds normal. Not distended. Soft. No tenderness or masses.  MUSCULOSKELETAL:  In wheelchair.   SKIN: Compression dressings intact.  PSYCHIATRIC: The patient is oriented to person, place, and time and has a pleasant affect.        ASSESSMENT/PLAN:  Chris was seen today for annual exam.    Diagnoses and all orders for this visit:    Chronic atrial fibrillation  -     Continue Coumadin therapy    Venous insufficiency  -     Continue wound care    Diabetes mellitus with peripheral circulatory disorder  -   Check labs  -    Encourage hydration      Other orders  -     hydrocodone-acetaminophen 7.5-325mg (NORCO) 7.5-325 mg per tablet; Take 1 tablet by mouth every 6 (six) hours as needed for Pain.  -     hydrocodone-acetaminophen 7.5-325mg (NORCO) 7.5-325 mg per tablet; Take 1 tablet by mouth every 6 (six) hours as needed for Pain.

## 2018-05-02 ENCOUNTER — TELEPHONE (OUTPATIENT)
Dept: INTERNAL MEDICINE | Facility: CLINIC | Age: 83
End: 2018-05-02

## 2018-05-02 NOTE — TELEPHONE ENCOUNTER
----- Message from Jerri Velasquez sent at 5/2/2018 12:43 PM CDT -----  Contact: [phone] ext [phone]  Hyacinth would like an order to be put in for an Aid for bathing the patient. She states he has some wounds on his legs that his family is not taking care of. Please advise.

## 2018-05-02 NOTE — TELEPHONE ENCOUNTER
Spoke with Ronal and she states she has been trying to help with bathing as she states sometimes he will not let her give him a bathe. Ronal states her son has been giving him a full bathe she states she does need some help for HHC for bathing.     Ronal states wound care is supposed to come and clean the wound and they don't have the supplies. She states she was advised that she has to do the wounds care herself if they don't come to the home. Ronal states she wants to change the HHC as she is not sure who she can change to. Ronal states she wants a new company

## 2018-05-03 NOTE — TELEPHONE ENCOUNTER
Please advise of name of home health company so orders can be entered.  In the meantime, if possible provide verbal order for home health aide to assist with bathing 1-2 times weekly.

## 2018-05-03 NOTE — TELEPHONE ENCOUNTER
Contact: [phone] ext [phone] Spoke with Liz Duarte) and she states the nurse went to the home and at 10 am and the patient was drinking beers and she states the PTINR is not within good ranges she states they will get someone in the home to give him a bath and take care of wounds.

## 2018-05-09 ENCOUNTER — TELEPHONE (OUTPATIENT)
Dept: INTERNAL MEDICINE | Facility: CLINIC | Age: 83
End: 2018-05-09

## 2018-05-09 NOTE — TELEPHONE ENCOUNTER
Linda called from [facility]. She states the patient has been complaining  That he is in pain per Ronal. Per Linda Villeda has been giving him more than the prescribed as he just get the Rx per Linda she states the patient is never asking for pain medications and or complain about pain during the wound changes as well. She also advised the patient is getting baths temporarily. As they will have to get baths done when the services has ended. Termed the call

## 2018-05-17 ENCOUNTER — TELEPHONE (OUTPATIENT)
Dept: INTERNAL MEDICINE | Facility: CLINIC | Age: 83
End: 2018-05-17

## 2018-05-17 NOTE — TELEPHONE ENCOUNTER
"----- Message from Phil Jarrett sent at 5/17/2018 10:31 AM CDT -----  Contact: Hyacinth Tripathi [phone]  Calling Requesting a ""     Please call and advise  Thank you  "

## 2018-05-17 NOTE — TELEPHONE ENCOUNTER
Returned call to Hyacinth with [facility]. No answer. Left message giving the verbal ok for a  yara.

## 2018-05-18 ENCOUNTER — OFFICE VISIT (OUTPATIENT)
Dept: WOUND CARE | Facility: CLINIC | Age: 83
End: 2018-05-18
Payer: MEDICARE

## 2018-05-18 VITALS
SYSTOLIC BLOOD PRESSURE: 94 MMHG | HEART RATE: 72 BPM | BODY MASS INDEX: 23.85 KG/M2 | HEIGHT: 69 IN | WEIGHT: 161 LBS | TEMPERATURE: 98 F | DIASTOLIC BLOOD PRESSURE: 59 MMHG

## 2018-05-18 DIAGNOSIS — I87.2 VENOUS INSUFFICIENCY: Primary | ICD-10-CM

## 2018-05-18 DIAGNOSIS — E11.8 TYPE 2 DIABETES MELLITUS WITH COMPLICATION, WITHOUT LONG-TERM CURRENT USE OF INSULIN: ICD-10-CM

## 2018-05-18 DIAGNOSIS — B35.3 TINEA PEDIS OF BOTH FEET: ICD-10-CM

## 2018-05-18 DIAGNOSIS — I89.0 ELEPHANTIASIS NOSTRA VERRUCOSA: ICD-10-CM

## 2018-05-18 DIAGNOSIS — L85.9 HYPERKERATOSIS: ICD-10-CM

## 2018-05-18 PROBLEM — L97.921 BILATERAL LEG ULCER, LIMITED TO BREAKDOWN OF SKIN: Status: RESOLVED | Noted: 2018-04-18 | Resolved: 2018-05-18

## 2018-05-18 PROBLEM — L97.911 BILATERAL LEG ULCER, LIMITED TO BREAKDOWN OF SKIN: Status: RESOLVED | Noted: 2018-04-18 | Resolved: 2018-05-18

## 2018-05-18 PROCEDURE — 99999 PR PBB SHADOW E&M-EST. PATIENT-LVL IV: CPT | Mod: PBBFAC,,, | Performed by: NURSE PRACTITIONER

## 2018-05-18 PROCEDURE — 99211 OFF/OP EST MAY X REQ PHY/QHP: CPT | Mod: S$PBB,,, | Performed by: NURSE PRACTITIONER

## 2018-05-18 PROCEDURE — 99214 OFFICE O/P EST MOD 30 MIN: CPT | Mod: PBBFAC | Performed by: NURSE PRACTITIONER

## 2018-05-18 RX ORDER — TRIAMCINOLONE ACETONIDE 1 MG/G
CREAM TOPICAL
Qty: 454 G | Refills: 3 | Status: SHIPPED | OUTPATIENT
Start: 2018-05-18 | End: 2019-04-29

## 2018-05-18 RX ORDER — ECONAZOLE NITRATE 10 MG/G
CREAM TOPICAL
Qty: 85 G | Refills: 2 | Status: SHIPPED | OUTPATIENT
Start: 2018-05-18

## 2018-05-18 NOTE — PROGRESS NOTES
Subjective:       Patient ID: Chris Asencio is a 87 y.o. male.    Chief Complaint: Wound Check    Wound Check     This patient is seen today for reevaluation  of ulcers to both lower legs.  He has Elephantiasis nostra verrucosa/ ichthyosis and was seen by Dr. Cardoso who prescribed amlactin cream for his thighs.  He has unna boots on both legs and his wounds are once again healed.  He keeps having recurrent ulcers because once home health discharges him, his legs are not washed nor are his creams applied on a daily basis as ordered.  His stockings are not applied daily nor are his legs wrapped with compression. He is afebrile.  He denies increased swelling, redness, or purulent drainage.  He does not have any pain.  His medical history is significant for type II diabetes.    Review of Systems   HENT: Negative for hearing loss, postnasal drip, rhinorrhea, sinus pressure, sneezing, tinnitus and trouble swallowing.    Eyes: Positive for visual disturbance (legally blind).   Respiratory: Positive for shortness of breath. Negative for apnea and wheezing.    Cardiovascular: Negative for palpitations and leg swelling.   Gastrointestinal: Negative for constipation and diarrhea.   Endocrine: Negative for cold intolerance, heat intolerance, polydipsia and polyuria.   Genitourinary: Negative for difficulty urinating, dysuria, frequency and hematuria.   Musculoskeletal: Negative for back pain.   Skin: Negative for wound.   Allergic/Immunologic: Negative for environmental allergies and food allergies.   Neurological: Negative for dizziness and light-headedness.   Hematological: Bruises/bleeds easily.   Psychiatric/Behavioral: Negative for confusion, decreased concentration, dysphoric mood and sleep disturbance. The patient is not nervous/anxious.        Objective:      Physical Exam   Constitutional: He is oriented to person, place, and time. He appears well-developed and well-nourished. No distress.   HENT:   Head: Normocephalic  and atraumatic.   Pulmonary/Chest: Effort normal. No respiratory distress.   Musculoskeletal: Normal range of motion. He exhibits no edema or tenderness.        Legs:  Neurological: He is alert and oriented to person, place, and time.   Skin: Skin is warm and dry. No rash noted. He is not diaphoretic. No erythema.   Psychiatric: He has a normal mood and affect. His behavior is normal. Judgment and thought content normal.   Nursing note and vitals reviewed.      ..  Hemoglobin A1C   Date Value Ref Range Status   10/20/2017 4.8 4.0 - 5.6 % Final     Comment:     According to ADA guidelines, hemoglobin A1c <7.0% represents  optimal control in non-pregnant diabetic patients. Different  metrics may apply to specific patient populations.   Standards of Medical Care in Diabetes-2016.  For the purpose of screening for the presence of diabetes:  <5.7%     Consistent with the absence of diabetes  5.7-6.4%  Consistent with increasing risk for diabetes   (prediabetes)  >or=6.5%  Consistent with diabetes  Currently, no consensus exists for use of hemoglobin A1c  for diagnosis of diabetes for children.  This Hemoglobin A1c assay has significant interference with fetal   hemoglobin   (HbF). The results are invalid for patients with abnormal amounts of   HbF,   including those with known Hereditary Persistence   of Fetal Hemoglobin. Heterozygous hemoglobin variants (HbAS, HbAC,   HbAD, HbAE, HbA2) do not significantly interfere with this assay;   however, presence of multiple variants in a sample may impact the %   interference.     03/10/2017 4.7 4.5 - 6.2 % Final     Comment:     According to ADA guidelines, hemoglobin A1C <7.0% represents  optimal control in non-pregnant diabetic patients.  Different  metrics may apply to specific populations.   Standards of Medical Care in Diabetes - 2016.  For the purpose of screening for the presence of diabetes:  <5.7%     Consistent with the absence of diabetes  5.7-6.4%  Consistent with  increasing risk for diabetes   (prediabetes)  >or=6.5%  Consistent with diabetes  Currently no consensus exists for use of hemoglobin A1C  for diagnosis of diabetes for children.     02/04/2016 5.1 4.5 - 6.2 % Final     Assessment:       1. Venous insufficiency    2. Tinea pedis of both feet    3. Hyperkeratosis    4. Type 2 diabetes mellitus with complication, without long-term current use of insulin        Plan:           Wash lower legs daily with dove soap and water. Dry thoroughly.  Pad shins with ABD pads. Kerlix and coban bilateral lower legs. May resume compression hose daily beginning tomorrow.  Apply amlactin to bilateral thighs as directed.  Apply triamcinolone cream to lower legs twice daily.  Apply spectazole cream to feet daily.  [facility] asked to see patient weekly x 2 weeks. If his legs break down again, notify our office.  Elevate legs when seated.  Do not keep legs dependent.  Return to this clinic as needed.

## 2018-05-18 NOTE — PATIENT INSTRUCTIONS
Unwrap lower legs tomorrow. May resume compression hose daily beginning tomorrow.  Wash lower legs daily with dove soap and water. Dry thoroughly.  Apply amlactin to bilateral thighs as directed.  Apply triamcinolone cream to lower legs twice daily.  Apply spectazole cream to feet daily.  Elevate legs when seated.  Do not keep legs dependent.

## 2018-05-23 NOTE — TELEPHONE ENCOUNTER
Spoke with the patients Daughter and she advised the patient was In pain. Advised her of the information from [facility] that he ws not in any pain. She advised he was and the [facility] was not stating facts. Ronal also states she has lost the Rx and she needs another one,

## 2018-05-23 NOTE — TELEPHONE ENCOUNTER
----- Message from Argentina Albarran sent at 5/23/2018  9:03 AM CDT -----  Contact: Ronal/ wife [phone]  Pt's wife called because she was holding a rx for patient's pain medicine which was due on 5/21/18 but she misplaced it. She said pt is in a lot of pain . She wants to send her son to pick it up today because he gets off around 1:30. She would like to speak with the nurse.

## 2018-05-24 RX ORDER — HYDROCODONE BITARTRATE AND ACETAMINOPHEN 7.5; 325 MG/1; MG/1
1 TABLET ORAL EVERY 6 HOURS PRN
Qty: 30 TABLET | Refills: 0 | Status: SHIPPED | OUTPATIENT
Start: 2018-05-24 | End: 2018-08-18 | Stop reason: SDUPTHER

## 2018-05-24 NOTE — TELEPHONE ENCOUNTER
Called [facility] spoke with Jacqui and advised of the [place]. Order has been faxed.

## 2018-05-24 NOTE — TELEPHONE ENCOUNTER
Please inform patient that I will only replace the prescription once.  I will no longer be able to provide multiple prescriptions at once.  Someone will have to  the prescription monthly.  Also, please asked home health if they're able to collect a urine toxicology.

## 2018-06-06 ENCOUNTER — TELEPHONE (OUTPATIENT)
Dept: ADMINISTRATIVE | Facility: CLINIC | Age: 83
End: 2018-06-06

## 2018-06-06 NOTE — PROGRESS NOTES
Home Health Recertification with [facility]. Dr. Arleen Garcia.  SN, MSW and HHA services.

## 2018-06-14 NOTE — TELEPHONE ENCOUNTER
"----- Message from Jamey Smith sent at 6/14/2018 10:44 AM CDT -----  Contact: Ronal Asencio (Daughter)  RX request - refill or new RX.  Is this a refill or new RX:  Refill   RX name and strength: hydrocodone-acetaminophen 7.5-325mg (NORCO) 7.5-325 mg per tablet  Directions:Take 1 tablet by mouth every 6 (six) hours as needed for Pain.    Is this a 30 day or 90 day RX:  30  Local pharmacy or mail order pharmacy:  local  Pharmacy name and phone # (DON'T enter "on file" or "in chart"): Please call pt once RX ready for pickup   Comments:          "

## 2018-06-15 RX ORDER — HYDROCODONE BITARTRATE AND ACETAMINOPHEN 7.5; 325 MG/1; MG/1
1 TABLET ORAL EVERY 6 HOURS PRN
Qty: 30 TABLET | Refills: 0 | Status: SHIPPED | OUTPATIENT
Start: 2018-06-18 | End: 2018-08-18 | Stop reason: SDUPTHER

## 2018-06-27 RX ORDER — WARFARIN 1 MG/1
TABLET ORAL
Qty: 30 TABLET | Refills: 0 | Status: SHIPPED | OUTPATIENT
Start: 2018-06-27 | End: 2018-08-15 | Stop reason: SDUPTHER

## 2018-07-26 ENCOUNTER — TELEPHONE (OUTPATIENT)
Dept: ADMINISTRATIVE | Facility: CLINIC | Age: 83
End: 2018-07-26

## 2018-07-26 NOTE — TELEPHONE ENCOUNTER
Home Health Recert 07/19/2018 to 09/16/2018 with [facility] , Dr Arleen Garcia. SN, MSW, HHA service.

## 2018-08-01 ENCOUNTER — TELEPHONE (OUTPATIENT)
Dept: INTERNAL MEDICINE | Facility: CLINIC | Age: 83
End: 2018-08-01

## 2018-08-01 NOTE — TELEPHONE ENCOUNTER
Fax from [facility] dated 7/30/18 sayd pt 15.0  inr 1.2  Drawn at 1300pm    Current dose is 6mg ev evening. Patient doesn't remember last time took coumadin and daughter was unavailable.    Continue same dosage?    Since you are on vacation- I can put notes on form and fax back once I get  Reply.    Thanks elizabet

## 2018-08-01 NOTE — TELEPHONE ENCOUNTER
When patient takes coumadin consistently, INR is therapeutic.  Please ask home health to recheck in 2 weeks, and stress medication adherence with patient.  Patient is visually impaired and requires assistance from daughter and home health.

## 2018-08-15 RX ORDER — WARFARIN 1 MG/1
TABLET ORAL
Qty: 30 TABLET | Refills: 0 | Status: SHIPPED | OUTPATIENT
Start: 2018-08-15 | End: 2018-10-10 | Stop reason: SDUPTHER

## 2018-08-15 RX ORDER — GLIPIZIDE 2.5 MG/1
TABLET, EXTENDED RELEASE ORAL
Qty: 30 TABLET | Refills: 0 | Status: SHIPPED | OUTPATIENT
Start: 2018-08-15 | End: 2018-09-24 | Stop reason: SDUPTHER

## 2018-08-18 ENCOUNTER — OFFICE VISIT (OUTPATIENT)
Dept: INTERNAL MEDICINE | Facility: CLINIC | Age: 83
End: 2018-08-18
Payer: MEDICARE

## 2018-08-18 VITALS
TEMPERATURE: 98 F | DIASTOLIC BLOOD PRESSURE: 64 MMHG | HEIGHT: 69 IN | RESPIRATION RATE: 16 BRPM | HEART RATE: 92 BPM | BODY MASS INDEX: 23.84 KG/M2 | SYSTOLIC BLOOD PRESSURE: 117 MMHG | WEIGHT: 160.94 LBS

## 2018-08-18 DIAGNOSIS — M25.569 KNEE PAIN, UNSPECIFIED CHRONICITY, UNSPECIFIED LATERALITY: ICD-10-CM

## 2018-08-18 DIAGNOSIS — I10 ESSENTIAL HYPERTENSION: ICD-10-CM

## 2018-08-18 DIAGNOSIS — J30.9 ALLERGIC RHINITIS, UNSPECIFIED SEASONALITY, UNSPECIFIED TRIGGER: ICD-10-CM

## 2018-08-18 DIAGNOSIS — E11.8 TYPE 2 DIABETES MELLITUS WITH COMPLICATION, WITHOUT LONG-TERM CURRENT USE OF INSULIN: ICD-10-CM

## 2018-08-18 DIAGNOSIS — I48.20 CHRONIC ATRIAL FIBRILLATION: Primary | ICD-10-CM

## 2018-08-18 PROCEDURE — 99999 PR PBB SHADOW E&M-EST. PATIENT-LVL III: CPT | Mod: PBBFAC,,, | Performed by: INTERNAL MEDICINE

## 2018-08-18 PROCEDURE — 99213 OFFICE O/P EST LOW 20 MIN: CPT | Mod: S$PBB,,, | Performed by: INTERNAL MEDICINE

## 2018-08-18 PROCEDURE — 99213 OFFICE O/P EST LOW 20 MIN: CPT | Mod: PBBFAC,PO | Performed by: INTERNAL MEDICINE

## 2018-08-18 RX ORDER — MONTELUKAST SODIUM 10 MG/1
10 TABLET ORAL NIGHTLY
Qty: 30 TABLET | Refills: 3 | Status: SHIPPED | OUTPATIENT
Start: 2018-08-18 | End: 2018-09-17

## 2018-08-18 RX ORDER — HYDROCODONE BITARTRATE AND ACETAMINOPHEN 7.5; 325 MG/1; MG/1
1 TABLET ORAL EVERY 6 HOURS PRN
Qty: 30 TABLET | Refills: 0 | Status: SHIPPED | OUTPATIENT
Start: 2018-08-18 | End: 2019-04-29 | Stop reason: SDUPTHER

## 2018-08-18 RX ORDER — HYDROCODONE BITARTRATE AND ACETAMINOPHEN 7.5; 325 MG/1; MG/1
1 TABLET ORAL EVERY 6 HOURS PRN
Qty: 30 TABLET | Refills: 0 | Status: SHIPPED | OUTPATIENT
Start: 2018-10-13 | End: 2018-12-11 | Stop reason: SDUPTHER

## 2018-08-18 RX ORDER — HYDROCODONE BITARTRATE AND ACETAMINOPHEN 7.5; 325 MG/1; MG/1
1 TABLET ORAL EVERY 6 HOURS PRN
Qty: 30 TABLET | Refills: 0 | Status: SHIPPED | OUTPATIENT
Start: 2018-09-15 | End: 2019-01-29 | Stop reason: SDUPTHER

## 2018-08-18 NOTE — PROGRESS NOTES
CC: followup of hypertension  HPI:  The patient is a 87 y.o. year old male who presents to the office for followup of hypertension.  The patient denies any chest pain, shortness of breath, headache, excessive fatigue, nausea or vomiting.  He complains of bilateral knee pain and stiffness.  He also reports rhinorrhea.    PAST MEDICAL HISTORY:  Past Medical History:   Diagnosis Date    *Atrial fibrillation     Diabetes mellitus type II     Glaucoma     Hyperlipidemia     Hypertension     Kyphosis        SURGICAL HISTORY:  Past Surgical History:   Procedure Laterality Date    CHOLECYSTECTOMY         MEDS:  Medcard reviewed and updated    ALLERGIES: Allergy Card reviewed and updated    SOCIAL HISTORY:   The patient is a nonsmoker.    PE:   APPEARANCE: Well nourished, well developed, in no acute distress.    CHEST: Lungs clear to auscultation with unlabored respirations.  CARDIOVASCULAR: Normal S1, S2. No murmurs. No carotid bruits. No pedal edema.  ABDOMEN: Bowel sounds normal. Not distended. Soft. No tenderness or masses.   MUSCULOSKELETAL:  In wheelchair.  PSYCHIATRIC: The patient is oriented to person, place, and time and has a pleasant affect.        ASSESSMENT/PLAN:  Chris was seen today for follow-up.    Diagnoses and all orders for this visit:    Chronic atrial fibrillation  -     continue Coumadin    Essential hypertension  -     blood pressure is controlled    Type 2 diabetes mellitus with complication, without long-term current use of insulin  -     continue glipizide    Knee pain, unspecified chronicity, unspecified laterality  -     HYDROcodone-acetaminophen (NORCO) 7.5-325 mg per tablet; Take 1 tablet by mouth every 6 (six) hours as needed for Pain.  -     HYDROcodone-acetaminophen (NORCO) 7.5-325 mg per tablet; Take 1 tablet by mouth every 6 (six) hours as needed for Pain.  -     HYDROcodone-acetaminophen (NORCO) 7.5-325 mg per tablet; Take 1 tablet by mouth every 6 (six) hours as needed for  Pain.    Allergic rhinitis, unspecified seasonality, unspecified trigger  -     start Singulair    Other orders  -     montelukast (SINGULAIR) 10 mg tablet; Take 1 tablet (10 mg total) by mouth every evening.

## 2018-09-18 ENCOUNTER — TELEPHONE (OUTPATIENT)
Dept: INTERNAL MEDICINE | Facility: CLINIC | Age: 83
End: 2018-09-18

## 2018-09-18 NOTE — TELEPHONE ENCOUNTER
----- Message from Sandy Burris sent at 9/18/2018  2:45 PM CDT -----  Contact: wife/[phone]  Stating that script HYDROcodone-acetaminophen (NORCO) 7.5-325 mg per tablet was thrown away and would like to get a new script. Please advise.

## 2018-09-21 ENCOUNTER — TELEPHONE (OUTPATIENT)
Dept: INTERNAL MEDICINE | Facility: CLINIC | Age: 83
End: 2018-09-21

## 2018-09-24 RX ORDER — GLIPIZIDE 2.5 MG/1
TABLET, EXTENDED RELEASE ORAL
Qty: 30 TABLET | Refills: 3 | Status: SHIPPED | OUTPATIENT
Start: 2018-09-24 | End: 2018-12-30

## 2018-10-10 RX ORDER — WARFARIN 1 MG/1
TABLET ORAL
Qty: 30 TABLET | Refills: 0 | Status: SHIPPED | OUTPATIENT
Start: 2018-10-10 | End: 2018-12-06 | Stop reason: SDUPTHER

## 2018-10-12 ENCOUNTER — TELEPHONE (OUTPATIENT)
Dept: ADMINISTRATIVE | Facility: CLINIC | Age: 83
End: 2018-10-12

## 2018-10-12 NOTE — TELEPHONE ENCOUNTER
Home Health Recert 09/17/2018 - 11/15/2018 with [facility] ([place]) - Dr. Arleen Garcia. Patient received SN, MSW and HHA services.

## 2018-11-13 RX ORDER — METOPROLOL TARTRATE 25 MG/1
TABLET, FILM COATED ORAL
Qty: 270 TABLET | Refills: 0 | Status: SHIPPED | OUTPATIENT
Start: 2018-11-13 | End: 2019-04-29

## 2018-12-06 RX ORDER — WARFARIN 1 MG/1
TABLET ORAL
Qty: 30 TABLET | Refills: 3 | Status: SHIPPED | OUTPATIENT
Start: 2018-12-06 | End: 2019-07-29 | Stop reason: SDUPTHER

## 2018-12-11 ENCOUNTER — TELEPHONE (OUTPATIENT)
Dept: ADMINISTRATIVE | Facility: CLINIC | Age: 83
End: 2018-12-11

## 2018-12-11 DIAGNOSIS — M25.569 KNEE PAIN, UNSPECIFIED CHRONICITY, UNSPECIFIED LATERALITY: ICD-10-CM

## 2018-12-11 RX ORDER — HYDROCODONE BITARTRATE AND ACETAMINOPHEN 7.5; 325 MG/1; MG/1
1 TABLET ORAL EVERY 6 HOURS PRN
Qty: 30 TABLET | Refills: 0 | Status: CANCELLED | OUTPATIENT
Start: 2018-12-11

## 2018-12-11 RX ORDER — HYDROCODONE BITARTRATE AND ACETAMINOPHEN 7.5; 325 MG/1; MG/1
1 TABLET ORAL EVERY 6 HOURS PRN
Qty: 30 TABLET | Refills: 0 | Status: SHIPPED | OUTPATIENT
Start: 2018-12-11 | End: 2018-12-26 | Stop reason: SDUPTHER

## 2018-12-11 NOTE — TELEPHONE ENCOUNTER
----- Message from Maribeth Rain sent at 12/10/2018 12:46 PM CST -----  Contact: daughter , Ronal Asencio call [phone]  Patient refuses to come out due to it being to cold to him. Daughter ask if you can call his pain medication in for him  [facility]  [phone] (Phone)  [phone] (Fax)

## 2018-12-11 NOTE — TELEPHONE ENCOUNTER
Home Health Recert 11/16/2018 - 01/14/2019 with [facility] ([place]) - Dr. Arleen Garcia. Patient received SN and HHA services.

## 2018-12-11 NOTE — TELEPHONE ENCOUNTER
Called and spoke with the daughter and I advised her per PCP she cant get the Pain Medications she states the patient was not cooperating she states he had on 3 jackets and long sleeve and he refuses to come outside and he had on 3 hats she states he said he was in pain and he requested she states she advised she was out of the Rx and the patient is in pain. Advised don't share medications and she understood and she states she was taking the same Norco as the patient and she was giving him her Rx as she was d/c from the medication and placed on a topical. Patient daughter states she advised him that he needs to come in the office she rescheduled the appointment. Message sent to PCP

## 2018-12-26 ENCOUNTER — LAB VISIT (OUTPATIENT)
Dept: LAB | Facility: HOSPITAL | Age: 83
End: 2018-12-26
Attending: INTERNAL MEDICINE
Payer: MEDICARE

## 2018-12-26 ENCOUNTER — OFFICE VISIT (OUTPATIENT)
Dept: INTERNAL MEDICINE | Facility: CLINIC | Age: 83
End: 2018-12-26
Payer: MEDICARE

## 2018-12-26 VITALS
WEIGHT: 166.69 LBS | SYSTOLIC BLOOD PRESSURE: 110 MMHG | BODY MASS INDEX: 24.69 KG/M2 | TEMPERATURE: 98 F | HEART RATE: 68 BPM | DIASTOLIC BLOOD PRESSURE: 60 MMHG | HEIGHT: 69 IN

## 2018-12-26 DIAGNOSIS — E11.8 TYPE 2 DIABETES MELLITUS WITH COMPLICATION, WITHOUT LONG-TERM CURRENT USE OF INSULIN: ICD-10-CM

## 2018-12-26 DIAGNOSIS — I10 ESSENTIAL HYPERTENSION: Primary | ICD-10-CM

## 2018-12-26 DIAGNOSIS — I10 ESSENTIAL HYPERTENSION: ICD-10-CM

## 2018-12-26 DIAGNOSIS — Z12.5 ENCOUNTER FOR PROSTATE CANCER SCREENING: ICD-10-CM

## 2018-12-26 DIAGNOSIS — M25.569 KNEE PAIN, UNSPECIFIED CHRONICITY, UNSPECIFIED LATERALITY: ICD-10-CM

## 2018-12-26 LAB
ALBUMIN SERPL BCP-MCNC: 3.3 G/DL
ALBUMIN SERPL BCP-MCNC: 3.3 G/DL
ALP SERPL-CCNC: 120 U/L
ALP SERPL-CCNC: 120 U/L
ALT SERPL W/O P-5'-P-CCNC: 15 U/L
ALT SERPL W/O P-5'-P-CCNC: 15 U/L
ANION GAP SERPL CALC-SCNC: 5 MMOL/L
ANION GAP SERPL CALC-SCNC: 5 MMOL/L
AST SERPL-CCNC: 24 U/L
AST SERPL-CCNC: 24 U/L
BASOPHILS # BLD AUTO: 0.02 K/UL
BASOPHILS NFR BLD: 0.4 %
BILIRUB SERPL-MCNC: 1.5 MG/DL
BILIRUB SERPL-MCNC: 1.5 MG/DL
BUN SERPL-MCNC: 15 MG/DL
BUN SERPL-MCNC: 15 MG/DL
CALCIUM SERPL-MCNC: 9.3 MG/DL
CALCIUM SERPL-MCNC: 9.3 MG/DL
CHLORIDE SERPL-SCNC: 108 MMOL/L
CHLORIDE SERPL-SCNC: 108 MMOL/L
CHOLEST SERPL-MCNC: 134 MG/DL
CHOLEST/HDLC SERPL: 2.2 {RATIO}
CO2 SERPL-SCNC: 28 MMOL/L
CO2 SERPL-SCNC: 28 MMOL/L
COMPLEXED PSA SERPL-MCNC: 3 NG/ML
CREAT SERPL-MCNC: 0.9 MG/DL
CREAT SERPL-MCNC: 0.9 MG/DL
DIFFERENTIAL METHOD: ABNORMAL
EOSINOPHIL # BLD AUTO: 0.1 K/UL
EOSINOPHIL NFR BLD: 2.3 %
ERYTHROCYTE [DISTWIDTH] IN BLOOD BY AUTOMATED COUNT: 15 %
EST. GFR  (AFRICAN AMERICAN): >60 ML/MIN/1.73 M^2
EST. GFR  (AFRICAN AMERICAN): >60 ML/MIN/1.73 M^2
EST. GFR  (NON AFRICAN AMERICAN): >60 ML/MIN/1.73 M^2
EST. GFR  (NON AFRICAN AMERICAN): >60 ML/MIN/1.73 M^2
ESTIMATED AVG GLUCOSE: 85 MG/DL
ESTIMATED AVG GLUCOSE: 85 MG/DL
GLUCOSE SERPL-MCNC: 54 MG/DL
GLUCOSE SERPL-MCNC: 54 MG/DL
HBA1C MFR BLD HPLC: 4.6 %
HBA1C MFR BLD HPLC: 4.6 %
HCT VFR BLD AUTO: 37.6 %
HDLC SERPL-MCNC: 61 MG/DL
HDLC SERPL: 45.5 %
HGB BLD-MCNC: 12.2 G/DL
IMM GRANULOCYTES # BLD AUTO: 0.01 K/UL
IMM GRANULOCYTES NFR BLD AUTO: 0.2 %
LDLC SERPL CALC-MCNC: 63.6 MG/DL
LYMPHOCYTES # BLD AUTO: 2.8 K/UL
LYMPHOCYTES NFR BLD: 49.8 %
MCH RBC QN AUTO: 27.2 PG
MCHC RBC AUTO-ENTMCNC: 32.4 G/DL
MCV RBC AUTO: 84 FL
MONOCYTES # BLD AUTO: 0.6 K/UL
MONOCYTES NFR BLD: 10.4 %
NEUTROPHILS # BLD AUTO: 2.1 K/UL
NEUTROPHILS NFR BLD: 36.9 %
NONHDLC SERPL-MCNC: 73 MG/DL
NRBC BLD-RTO: 0 /100 WBC
PLATELET # BLD AUTO: 158 K/UL
PMV BLD AUTO: 10.6 FL
POTASSIUM SERPL-SCNC: 4.5 MMOL/L
POTASSIUM SERPL-SCNC: 4.5 MMOL/L
PROT SERPL-MCNC: 7.8 G/DL
PROT SERPL-MCNC: 7.8 G/DL
RBC # BLD AUTO: 4.48 M/UL
SODIUM SERPL-SCNC: 141 MMOL/L
SODIUM SERPL-SCNC: 141 MMOL/L
TRIGL SERPL-MCNC: 47 MG/DL
TSH SERPL DL<=0.005 MIU/L-ACNC: 1.67 UIU/ML
WBC # BLD AUTO: 5.56 K/UL

## 2018-12-26 PROCEDURE — 84443 ASSAY THYROID STIM HORMONE: CPT

## 2018-12-26 PROCEDURE — 99213 OFFICE O/P EST LOW 20 MIN: CPT | Mod: PBBFAC,PO | Performed by: INTERNAL MEDICINE

## 2018-12-26 PROCEDURE — 99213 OFFICE O/P EST LOW 20 MIN: CPT | Mod: S$PBB,,, | Performed by: INTERNAL MEDICINE

## 2018-12-26 PROCEDURE — 80061 LIPID PANEL: CPT

## 2018-12-26 PROCEDURE — 84153 ASSAY OF PSA TOTAL: CPT

## 2018-12-26 PROCEDURE — 85025 COMPLETE CBC W/AUTO DIFF WBC: CPT

## 2018-12-26 PROCEDURE — 80053 COMPREHEN METABOLIC PANEL: CPT

## 2018-12-26 PROCEDURE — 83036 HEMOGLOBIN GLYCOSYLATED A1C: CPT

## 2018-12-26 PROCEDURE — 36415 COLL VENOUS BLD VENIPUNCTURE: CPT | Mod: PO

## 2018-12-26 PROCEDURE — 99999 PR PBB SHADOW E&M-EST. PATIENT-LVL III: CPT | Mod: PBBFAC,,, | Performed by: INTERNAL MEDICINE

## 2018-12-26 RX ORDER — HYDROCODONE BITARTRATE AND ACETAMINOPHEN 7.5; 325 MG/1; MG/1
1 TABLET ORAL EVERY 6 HOURS PRN
Qty: 30 TABLET | Refills: 0 | Status: SHIPPED | OUTPATIENT
Start: 2018-12-26 | End: 2019-07-10 | Stop reason: SDUPTHER

## 2018-12-26 NOTE — PROGRESS NOTES
CC: followup of hypertension  HPI:  The patient is a 87 y.o. year old male who presents to the office for followup of hypertension.  The patient denies any chest pain, shortness of breath, headache, excessive fatigue, nausea or vomiting.  He complains of neck pain.  Patient's daughter reports he fell out of the bed recently and exacerbated knee pain.  Daughter requests a hospital bed.    PAST MEDICAL HISTORY:  Past Medical History:   Diagnosis Date    *Atrial fibrillation     Diabetes mellitus type II     Glaucoma     Hyperlipidemia     Hypertension     Kyphosis        SURGICAL HISTORY:  Past Surgical History:   Procedure Laterality Date    CHOLECYSTECTOMY         MEDS:  Medcard reviewed and updated    ALLERGIES: Allergy Card reviewed and updated    SOCIAL HISTORY:   The patient is a nonsmoker.    PE:   APPEARANCE: Well nourished, well developed, in no acute distress.    EYES: Sclerae anicteric. PERRL. EOMI.      EARS: TM's intact. No retraction or perforation.    NOSE: Mucosa pink. Airway clear.  MOUTH & THROAT: No tonsillar enlargement. No pharyngeal erythema or exudate. No stridor.  NECK: Supple, no thyromegaly.  NODES: No cervical, axillary or inguinal lymph node enlargement.  CHEST: Lungs clear to auscultation with unlabored respirations.  CARDIOVASCULAR: Normal S1, S2. No murmurs. No carotid bruits. No pedal edema.  ABDOMEN: Bowel sounds normal. Not distended. Soft. No tenderness or masses. No organomegaly.  MUSCULOSKELETAL:  In wheelchair.  Ambulates with cane.  PSYCHIATRIC: The patient is oriented to person, place, and time and has a pleasant affect.        ASSESSMENT/PLAN:  Chris was seen today for follow-up.    Diagnoses and all orders for this visit:    Essential hypertension  -     blood pressure is controlled    Type 2 diabetes mellitus with complication, without long-term current use of insulin  -     Comprehensive metabolic panel; Future  -     Hemoglobin A1c; Future    Knee pain, unspecified  chronicity, unspecified laterality  -     HYDROcodone-acetaminophen (NORCO) 7.5-325 mg per tablet; Take 1 tablet by mouth every 6 (six) hours as needed for Pain.

## 2018-12-30 ENCOUNTER — TELEPHONE (OUTPATIENT)
Dept: INTERNAL MEDICINE | Facility: CLINIC | Age: 83
End: 2018-12-30

## 2018-12-31 NOTE — TELEPHONE ENCOUNTER
Called and spoke with the patients daughter Ronal and she understood to d/c the Rx she also states she will monitor his levels and call if needed. Termed the call

## 2019-01-09 ENCOUNTER — TELEPHONE (OUTPATIENT)
Dept: INTERNAL MEDICINE | Facility: CLINIC | Age: 84
End: 2019-01-09

## 2019-01-10 ENCOUNTER — TELEPHONE (OUTPATIENT)
Dept: INTERNAL MEDICINE | Facility: CLINIC | Age: 84
End: 2019-01-10

## 2019-01-10 NOTE — TELEPHONE ENCOUNTER
Called patient for an update on if bleeding has stopped.  No answer at home number and cell number is out of order.    Message left at home number to return call.    Spoke with Rafia (STEPHEN) and informed of my attempts to reach patient.  She states she did re-wrap with compression and informed grandson to keep an eye on bleeding.  Informed that if she's able to reach patient to inform if bleeding hasn't stopped to report to UC or ER.

## 2019-01-10 NOTE — TELEPHONE ENCOUNTER
----- Message from Sharlene Garcia sent at 1/10/2019  2:33 PM CST -----  Contact: Rafia @ [facility] Cell# [phone]  Rafia is calling in regards to letting you know that the patient had a visit on today and she noticed blood on the bed, the patient said that he hit is left foot third toe and his toe nail came completely off. She said that the patient said that he don't know where he hit his toe at,  but it was a lot of blood and the patient was not taken to the doctor. Patient had his toe rapped but when the nurse un rapped it the bleeding wasn't as bad she said but it still is bleeding some.

## 2019-01-16 ENCOUNTER — TELEPHONE (OUTPATIENT)
Dept: ADMINISTRATIVE | Facility: CLINIC | Age: 84
End: 2019-01-16

## 2019-01-16 NOTE — TELEPHONE ENCOUNTER
Home Health SOC 12/19/2018 to 02/16/2019 with [facility] , Dr Arleen Garcia. SN , HHA services provided.

## 2019-01-17 ENCOUNTER — TELEPHONE (OUTPATIENT)
Dept: ADMINISTRATIVE | Facility: CLINIC | Age: 84
End: 2019-01-17

## 2019-01-29 ENCOUNTER — TELEPHONE (OUTPATIENT)
Dept: INTERNAL MEDICINE | Facility: CLINIC | Age: 84
End: 2019-01-29

## 2019-01-29 DIAGNOSIS — M25.569 KNEE PAIN, UNSPECIFIED CHRONICITY, UNSPECIFIED LATERALITY: ICD-10-CM

## 2019-01-29 RX ORDER — MONTELUKAST SODIUM 10 MG/1
10 TABLET ORAL DAILY
Qty: 30 TABLET | Refills: 3 | Status: SHIPPED | OUTPATIENT
Start: 2019-01-29 | End: 2019-01-31 | Stop reason: SDUPTHER

## 2019-01-29 RX ORDER — HYDROCODONE BITARTRATE AND ACETAMINOPHEN 7.5; 325 MG/1; MG/1
1 TABLET ORAL EVERY 6 HOURS PRN
Qty: 30 TABLET | Refills: 0 | Status: SHIPPED | OUTPATIENT
Start: 2019-01-29 | End: 2019-03-07 | Stop reason: SDUPTHER

## 2019-01-29 NOTE — TELEPHONE ENCOUNTER
Prescription for Singulair has been sent to pharmacy electronically.  Prescription for hydrocodone is ready for pickup.

## 2019-01-29 NOTE — TELEPHONE ENCOUNTER
"----- Message from Katrina Agee sent at 1/29/2019  4:20 PM CST -----  Contact: Ronal/daughter/ [phone]  Patient would like to get medical advice.    Symptoms (please be specific):  Nose running,     How long has patient had these symptoms:      Pharmacy name and phone # (DON'T enter "on file" or "in chart"):      Any drug allergies:      Would you prefer a response via [employer]?:      Comments:  HYDROcodone-acetaminophen (NORCO) 7.5-325 mg per tablet 30 tablet , [facility] on [address]  "

## 2019-01-31 RX ORDER — MONTELUKAST SODIUM 10 MG/1
10 TABLET ORAL DAILY
Qty: 30 TABLET | Refills: 3 | Status: SHIPPED | OUTPATIENT
Start: 2019-01-31 | End: 2019-03-02

## 2019-02-12 ENCOUNTER — TELEPHONE (OUTPATIENT)
Dept: INTERNAL MEDICINE | Facility: CLINIC | Age: 84
End: 2019-02-12

## 2019-02-12 NOTE — TELEPHONE ENCOUNTER
----- Message from Kathrine Reich sent at 2/12/2019 11:15 AM CST -----  Contact: [facility] Chitra [phone]  Caller is calling to report pt had a fall. Pt is complaining of severe left shoulder pain.  Pt also has a sore or wound on right foot, second toe.Caller is requesting orders for  Physical therapy and a hospital bed.Please advise.

## 2019-02-12 NOTE — TELEPHONE ENCOUNTER
Ronal called and advised that the patient is refusing to go the ED she states she try to lift him to get him in the bed she states he did eat his food and she states patient advised he was hurting in the upper extremity she states when she touch it its in pain and he takes the pain medication [facility] advised they where not comfortable with how he is sitting. Patient advised he is alert and talking as normal. Daughter states he walked to the rest room and she advised he did get out the wheel chair and she will follow up if he agrees to go the ED [facility] nurse also states he refused to go to the ED as well.

## 2019-02-18 RX ORDER — NYSTATIN AND TRIAMCINOLONE ACETONIDE 100000; 1 [USP'U]/G; MG/G
CREAM TOPICAL 4 TIMES DAILY
Qty: 30 G | Refills: 1 | Status: ON HOLD | OUTPATIENT
Start: 2019-02-18 | End: 2020-01-24 | Stop reason: HOSPADM
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
## 2019-03-07 DIAGNOSIS — M25.569 KNEE PAIN, UNSPECIFIED CHRONICITY, UNSPECIFIED LATERALITY: ICD-10-CM

## 2019-03-07 RX ORDER — HYDROCODONE BITARTRATE AND ACETAMINOPHEN 7.5; 325 MG/1; MG/1
1 TABLET ORAL EVERY 6 HOURS PRN
Qty: 30 TABLET | Refills: 0 | Status: SHIPPED | OUTPATIENT
Start: 2019-03-07 | End: 2019-06-04 | Stop reason: SDUPTHER

## 2019-03-07 NOTE — TELEPHONE ENCOUNTER
"----- Message from Katrina Agee sent at 3/7/2019  2:34 PM CST -----  Contact: pt daughter/partice [phone]  RX request - refill or new RX.  Is this a refill or new RX:  refill  RX name and strength: HYDROcodone-acetaminophen (NORCO) 7.5-325 mg per tablet    Directions:   Is this a 30 day or 90 day RX: 30   Local pharmacy or mail order pharmacy:  local  Pharmacy name and phone # (DON'T enter "on file" or "in chart"):   Comments:  [facility]      "

## 2019-04-08 ENCOUNTER — TELEPHONE (OUTPATIENT)
Dept: INTERNAL MEDICINE | Facility: CLINIC | Age: 84
End: 2019-04-08

## 2019-04-08 DIAGNOSIS — I48.20 CHRONIC ATRIAL FIBRILLATION: Primary | ICD-10-CM

## 2019-04-08 NOTE — TELEPHONE ENCOUNTER
----- Message from Maribeth Rain sent at 4/8/2019  9:23 AM CDT -----  Contact: Chitra EWING  with [facility]  [phone]  She has not been able to get in touch with this patient to get his INR done. Please send a new order to take it again.

## 2019-04-08 NOTE — TELEPHONE ENCOUNTER
----- Message from Maribeth Rain sent at 4/8/2019  9:23 AM CDT -----  Contact: Chitra EWING  with [facility]  [phone]  She has not been able to get in touch with this patient to get his INR done. Please send a new order to take it again.

## 2019-04-23 ENCOUNTER — TELEPHONE (OUTPATIENT)
Dept: INTERNAL MEDICINE | Facility: CLINIC | Age: 84
End: 2019-04-23

## 2019-04-23 NOTE — TELEPHONE ENCOUNTER
Daughter called and advised she wants a Refill Rx on the Norco and I advised they have missed the appointments she agreed and she was scheduled to come in sooner. She also advised she wants another HHC as she not satisfied with her current HHC. Apt was scheduled

## 2019-04-29 ENCOUNTER — OFFICE VISIT (OUTPATIENT)
Dept: INTERNAL MEDICINE | Facility: CLINIC | Age: 84
End: 2019-04-29
Payer: MEDICARE

## 2019-04-29 ENCOUNTER — LAB VISIT (OUTPATIENT)
Dept: LAB | Facility: HOSPITAL | Age: 84
End: 2019-04-29
Attending: INTERNAL MEDICINE
Payer: MEDICARE

## 2019-04-29 VITALS
BODY MASS INDEX: 24.59 KG/M2 | TEMPERATURE: 98 F | DIASTOLIC BLOOD PRESSURE: 55 MMHG | WEIGHT: 166 LBS | HEART RATE: 93 BPM | HEIGHT: 69 IN | SYSTOLIC BLOOD PRESSURE: 110 MMHG

## 2019-04-29 DIAGNOSIS — M40.209 KYPHOSIS, UNSPECIFIED KYPHOSIS TYPE, UNSPECIFIED SPINAL REGION: ICD-10-CM

## 2019-04-29 DIAGNOSIS — I10 ESSENTIAL HYPERTENSION: ICD-10-CM

## 2019-04-29 DIAGNOSIS — E11.8 TYPE 2 DIABETES MELLITUS WITH COMPLICATION, WITHOUT LONG-TERM CURRENT USE OF INSULIN: ICD-10-CM

## 2019-04-29 DIAGNOSIS — M25.569 KNEE PAIN, UNSPECIFIED CHRONICITY, UNSPECIFIED LATERALITY: ICD-10-CM

## 2019-04-29 DIAGNOSIS — I48.20 CHRONIC ATRIAL FIBRILLATION: ICD-10-CM

## 2019-04-29 DIAGNOSIS — I10 ESSENTIAL HYPERTENSION: Primary | ICD-10-CM

## 2019-04-29 LAB
ALBUMIN SERPL BCP-MCNC: 3 G/DL (ref 3.5–5.2)
ALP SERPL-CCNC: 127 U/L (ref 55–135)
ALT SERPL W/O P-5'-P-CCNC: 8 U/L (ref 10–44)
ANION GAP SERPL CALC-SCNC: 6 MMOL/L (ref 8–16)
AST SERPL-CCNC: 14 U/L (ref 10–40)
BASOPHILS # BLD AUTO: 0.03 K/UL (ref 0–0.2)
BASOPHILS NFR BLD: 0.7 % (ref 0–1.9)
BILIRUB DIRECT SERPL-MCNC: 0.7 MG/DL (ref 0.1–0.3)
BILIRUB SERPL-MCNC: 1.6 MG/DL (ref 0.1–1)
BUN SERPL-MCNC: 13 MG/DL (ref 8–23)
CALCIUM SERPL-MCNC: 9.3 MG/DL (ref 8.7–10.5)
CHLORIDE SERPL-SCNC: 108 MMOL/L (ref 95–110)
CO2 SERPL-SCNC: 26 MMOL/L (ref 23–29)
CREAT SERPL-MCNC: 0.7 MG/DL (ref 0.5–1.4)
DIFFERENTIAL METHOD: ABNORMAL
EOSINOPHIL # BLD AUTO: 0.1 K/UL (ref 0–0.5)
EOSINOPHIL NFR BLD: 2.5 % (ref 0–8)
ERYTHROCYTE [DISTWIDTH] IN BLOOD BY AUTOMATED COUNT: 15.2 % (ref 11.5–14.5)
EST. GFR  (AFRICAN AMERICAN): >60 ML/MIN/1.73 M^2
EST. GFR  (NON AFRICAN AMERICAN): >60 ML/MIN/1.73 M^2
ESTIMATED AVG GLUCOSE: 91 MG/DL (ref 68–131)
GLUCOSE SERPL-MCNC: 72 MG/DL (ref 70–110)
HBA1C MFR BLD HPLC: 4.8 % (ref 4–5.6)
HCT VFR BLD AUTO: 32.2 % (ref 40–54)
HGB BLD-MCNC: 10.7 G/DL (ref 14–18)
IMM GRANULOCYTES # BLD AUTO: 0.01 K/UL (ref 0–0.04)
IMM GRANULOCYTES NFR BLD AUTO: 0.2 % (ref 0–0.5)
LYMPHOCYTES # BLD AUTO: 2.1 K/UL (ref 1–4.8)
LYMPHOCYTES NFR BLD: 47 % (ref 18–48)
MCH RBC QN AUTO: 27.2 PG (ref 27–31)
MCHC RBC AUTO-ENTMCNC: 33.2 G/DL (ref 32–36)
MCV RBC AUTO: 82 FL (ref 82–98)
MONOCYTES # BLD AUTO: 0.4 K/UL (ref 0.3–1)
MONOCYTES NFR BLD: 8.5 % (ref 4–15)
NEUTROPHILS # BLD AUTO: 1.8 K/UL (ref 1.8–7.7)
NEUTROPHILS NFR BLD: 41.1 % (ref 38–73)
NRBC BLD-RTO: 0 /100 WBC
PLATELET # BLD AUTO: 184 K/UL (ref 150–350)
PMV BLD AUTO: 10.3 FL (ref 9.2–12.9)
POTASSIUM SERPL-SCNC: 3.9 MMOL/L (ref 3.5–5.1)
PROT SERPL-MCNC: 7.1 G/DL (ref 6–8.4)
RBC # BLD AUTO: 3.93 M/UL (ref 4.6–6.2)
SODIUM SERPL-SCNC: 140 MMOL/L (ref 136–145)
WBC # BLD AUTO: 4.47 K/UL (ref 3.9–12.7)

## 2019-04-29 PROCEDURE — 85025 COMPLETE CBC W/AUTO DIFF WBC: CPT

## 2019-04-29 PROCEDURE — 99499 UNLISTED E&M SERVICE: CPT | Mod: S$PBB,,, | Performed by: INTERNAL MEDICINE

## 2019-04-29 PROCEDURE — 80048 BASIC METABOLIC PNL TOTAL CA: CPT

## 2019-04-29 PROCEDURE — 99999 PR PBB SHADOW E&M-EST. PATIENT-LVL III: CPT | Mod: PBBFAC,,, | Performed by: INTERNAL MEDICINE

## 2019-04-29 PROCEDURE — 36415 COLL VENOUS BLD VENIPUNCTURE: CPT | Mod: PO

## 2019-04-29 PROCEDURE — 99213 OFFICE O/P EST LOW 20 MIN: CPT | Mod: PBBFAC,PO | Performed by: INTERNAL MEDICINE

## 2019-04-29 PROCEDURE — 80076 HEPATIC FUNCTION PANEL: CPT

## 2019-04-29 PROCEDURE — 99499 RISK ADDL DX/OHS AUDIT: ICD-10-PCS | Mod: S$PBB,,, | Performed by: INTERNAL MEDICINE

## 2019-04-29 PROCEDURE — 83036 HEMOGLOBIN GLYCOSYLATED A1C: CPT

## 2019-04-29 PROCEDURE — 99999 PR PBB SHADOW E&M-EST. PATIENT-LVL III: ICD-10-PCS | Mod: PBBFAC,,, | Performed by: INTERNAL MEDICINE

## 2019-04-29 PROCEDURE — 99214 PR OFFICE/OUTPT VISIT, EST, LEVL IV, 30-39 MIN: ICD-10-PCS | Mod: S$PBB,,, | Performed by: INTERNAL MEDICINE

## 2019-04-29 PROCEDURE — 99214 OFFICE O/P EST MOD 30 MIN: CPT | Mod: S$PBB,,, | Performed by: INTERNAL MEDICINE

## 2019-04-29 RX ORDER — MONTELUKAST SODIUM 10 MG/1
10 TABLET ORAL DAILY
Qty: 30 TABLET | Refills: 3 | Status: SHIPPED | OUTPATIENT
Start: 2019-04-29 | End: 2020-07-20

## 2019-04-29 RX ORDER — HYDROCODONE BITARTRATE AND ACETAMINOPHEN 7.5; 325 MG/1; MG/1
1 TABLET ORAL EVERY 6 HOURS PRN
Qty: 30 TABLET | Refills: 0 | Status: SHIPPED | OUTPATIENT
Start: 2019-04-29 | End: 2019-07-29 | Stop reason: SDUPTHER

## 2019-04-29 NOTE — PROGRESS NOTES
CC: followup of hypertension  HPI:  The patient is a 88 y.o. year old male who presents to the office for followup of hypertension.  The patient denies any chest pain, shortness of breath, headache, excessive fatigue, nausea or vomiting.  He complains of stiffness across the middle of his back.  His daughter states he is not eating as much.  He continues to experience knee pain.    PAST MEDICAL HISTORY:  Past Medical History:   Diagnosis Date    *Atrial fibrillation     Diabetes mellitus type II     Glaucoma     Hyperlipidemia     Hypertension     Kyphosis        SURGICAL HISTORY:  Past Surgical History:   Procedure Laterality Date    CHOLECYSTECTOMY         MEDS:  Medcard reviewed and updated    ALLERGIES: Allergy Card reviewed and updated    SOCIAL HISTORY:   The patient is a nonsmoker.    PE:   APPEARANCE: Well nourished, well developed, in no acute distress.    NECK: Positive kyphosis.  CHEST: Lungs clear to auscultation with unlabored respirations.  CARDIOVASCULAR: Irregularly, irregular S1, S2. No murmurs. No carotid bruits. No pedal edema.  ABDOMEN: Bowel sounds normal. Not distended. Soft. No tenderness or masses.   PSYCHIATRIC: The patient is oriented to person, place, and time and has a pleasant affect.        ASSESSMENT/PLAN:  Chris was seen today for annual exam.    Diagnoses and all orders for this visit:    Essential hypertension  -     Hepatic function panel; Future  -     CBC auto differential; Future  -     blood pressure is controlled    Chronic atrial fibrillation  -     continue Coumadin    Type 2 diabetes mellitus with complication, without long-term current use of insulin  -     Basic metabolic panel; Future  -     Hemoglobin A1c; Future    Kyphosis, unspecified kyphosis type, unspecified spinal region    Knee pain, unspecified chronicity, unspecified laterality  -     HYDROcodone-acetaminophen (NORCO) 7.5-325 mg per tablet; Take 1 tablet by mouth every 6 (six) hours as needed for  Pain.    Other orders  -     montelukast (SINGULAIR) 10 mg tablet; Take 1 tablet (10 mg total) by mouth once daily.

## 2019-05-15 PROCEDURE — G0179 MD RECERTIFICATION HHA PT: HCPCS | Mod: ,,, | Performed by: INTERNAL MEDICINE

## 2019-05-15 PROCEDURE — G0179 PR HOME HEALTH MD RECERTIFICATION: ICD-10-PCS | Mod: ,,, | Performed by: INTERNAL MEDICINE

## 2019-06-04 ENCOUNTER — TELEPHONE (OUTPATIENT)
Dept: INTERNAL MEDICINE | Facility: CLINIC | Age: 84
End: 2019-06-04

## 2019-06-04 DIAGNOSIS — R53.81 DEBILITY: ICD-10-CM

## 2019-06-04 DIAGNOSIS — M40.209 KYPHOSIS, UNSPECIFIED KYPHOSIS TYPE, UNSPECIFIED SPINAL REGION: Primary | ICD-10-CM

## 2019-06-04 DIAGNOSIS — M25.569 KNEE PAIN, UNSPECIFIED CHRONICITY, UNSPECIFIED LATERALITY: ICD-10-CM

## 2019-06-04 RX ORDER — HYDROCODONE BITARTRATE AND ACETAMINOPHEN 7.5; 325 MG/1; MG/1
1 TABLET ORAL EVERY 6 HOURS PRN
Qty: 30 TABLET | Refills: 0 | Status: SHIPPED | OUTPATIENT
Start: 2019-06-04 | End: 2019-09-09 | Stop reason: SDUPTHER

## 2019-06-04 NOTE — TELEPHONE ENCOUNTER
The patient does not need to restart the diabetes medication.  His last hemoglobin A1c was normal.  Prescription is ready for pickup, and hospital bed has been ordered.

## 2019-06-04 NOTE — TELEPHONE ENCOUNTER
----- Message from Argentina Albarran sent at 6/4/2019  1:44 PM CDT -----  Contact: Ronal / daughter [phone]  Patient's daughter called to request a rx for norco. Also, they requested a hospital bed and have not heard back from anyone. She also wants to ask Ivana whether pt should start taking his diabetes medicine again.

## 2019-06-12 ENCOUNTER — EXTERNAL HOME HEALTH (OUTPATIENT)
Dept: HOME HEALTH SERVICES | Facility: HOSPITAL | Age: 84
End: 2019-06-12
Payer: MEDICARE

## 2019-07-10 DIAGNOSIS — M25.569 KNEE PAIN, UNSPECIFIED CHRONICITY, UNSPECIFIED LATERALITY: ICD-10-CM

## 2019-07-10 RX ORDER — HYDROCODONE BITARTRATE AND ACETAMINOPHEN 7.5; 325 MG/1; MG/1
1 TABLET ORAL EVERY 6 HOURS PRN
Qty: 30 TABLET | Refills: 0 | Status: ON HOLD | OUTPATIENT
Start: 2019-07-10 | End: 2020-01-24 | Stop reason: HOSPADM

## 2019-07-10 NOTE — TELEPHONE ENCOUNTER
----- Message from Julieta Buckley sent at 7/10/2019 11:51 AM CDT -----  Contact: self   Patient is calling for an RX refill or new RX.  Is this a refill or new RX:  refill  RX name and strength: HYDROcodone-acetaminophen (NORCO) 7.5-325 mg per tablet  Directions (copy/paste from chart):    Is this a 30 day or 90 day RX:    Local pharmacy or mail order pharmacy:  print  Pharmacy name and phone # (copy/paste from chart):     Comments:

## 2019-07-23 ENCOUNTER — TELEPHONE (OUTPATIENT)
Dept: INTERNAL MEDICINE | Facility: CLINIC | Age: 84
End: 2019-07-23

## 2019-07-23 NOTE — TELEPHONE ENCOUNTER
Message from Daughter Ronal [phone] she advised she changed his insurance advised she needs to change his insurance call transferred. -

## 2019-07-29 ENCOUNTER — LAB VISIT (OUTPATIENT)
Dept: LAB | Facility: HOSPITAL | Age: 84
End: 2019-07-29
Attending: INTERNAL MEDICINE
Payer: MEDICARE

## 2019-07-29 ENCOUNTER — OFFICE VISIT (OUTPATIENT)
Dept: INTERNAL MEDICINE | Facility: CLINIC | Age: 84
End: 2019-07-29
Payer: MEDICARE

## 2019-07-29 VITALS
HEIGHT: 69 IN | DIASTOLIC BLOOD PRESSURE: 50 MMHG | SYSTOLIC BLOOD PRESSURE: 81 MMHG | WEIGHT: 166 LBS | BODY MASS INDEX: 24.59 KG/M2 | HEART RATE: 75 BPM | TEMPERATURE: 99 F

## 2019-07-29 DIAGNOSIS — I48.20 CHRONIC ATRIAL FIBRILLATION: ICD-10-CM

## 2019-07-29 DIAGNOSIS — E11.8 TYPE 2 DIABETES MELLITUS WITH COMPLICATION, WITHOUT LONG-TERM CURRENT USE OF INSULIN: ICD-10-CM

## 2019-07-29 DIAGNOSIS — M54.2 NECK PAIN: Primary | ICD-10-CM

## 2019-07-29 DIAGNOSIS — M25.569 KNEE PAIN, UNSPECIFIED CHRONICITY, UNSPECIFIED LATERALITY: ICD-10-CM

## 2019-07-29 LAB
ALBUMIN SERPL BCP-MCNC: 3 G/DL (ref 3.5–5.2)
ALP SERPL-CCNC: 88 U/L (ref 55–135)
ALT SERPL W/O P-5'-P-CCNC: 9 U/L (ref 10–44)
ANION GAP SERPL CALC-SCNC: 6 MMOL/L (ref 8–16)
AST SERPL-CCNC: 16 U/L (ref 10–40)
BASOPHILS # BLD AUTO: 0.02 K/UL (ref 0–0.2)
BASOPHILS NFR BLD: 0.5 % (ref 0–1.9)
BILIRUB SERPL-MCNC: 1.2 MG/DL (ref 0.1–1)
BUN SERPL-MCNC: 11 MG/DL (ref 8–23)
CALCIUM SERPL-MCNC: 9 MG/DL (ref 8.7–10.5)
CHLORIDE SERPL-SCNC: 107 MMOL/L (ref 95–110)
CO2 SERPL-SCNC: 27 MMOL/L (ref 23–29)
CREAT SERPL-MCNC: 0.7 MG/DL (ref 0.5–1.4)
DIFFERENTIAL METHOD: ABNORMAL
EOSINOPHIL # BLD AUTO: 0.1 K/UL (ref 0–0.5)
EOSINOPHIL NFR BLD: 3.5 % (ref 0–8)
ERYTHROCYTE [DISTWIDTH] IN BLOOD BY AUTOMATED COUNT: 14.9 % (ref 11.5–14.5)
EST. GFR  (AFRICAN AMERICAN): >60 ML/MIN/1.73 M^2
EST. GFR  (NON AFRICAN AMERICAN): >60 ML/MIN/1.73 M^2
ESTIMATED AVG GLUCOSE: 94 MG/DL (ref 68–131)
GLUCOSE SERPL-MCNC: 94 MG/DL (ref 70–110)
HBA1C MFR BLD HPLC: 4.9 % (ref 4–5.6)
HCT VFR BLD AUTO: 36.1 % (ref 40–54)
HGB BLD-MCNC: 11.8 G/DL (ref 14–18)
IMM GRANULOCYTES # BLD AUTO: 0.02 K/UL (ref 0–0.04)
IMM GRANULOCYTES NFR BLD AUTO: 0.5 % (ref 0–0.5)
LYMPHOCYTES # BLD AUTO: 2.2 K/UL (ref 1–4.8)
LYMPHOCYTES NFR BLD: 58.5 % (ref 18–48)
MCH RBC QN AUTO: 27 PG (ref 27–31)
MCHC RBC AUTO-ENTMCNC: 32.7 G/DL (ref 32–36)
MCV RBC AUTO: 83 FL (ref 82–98)
MONOCYTES # BLD AUTO: 0.3 K/UL (ref 0.3–1)
MONOCYTES NFR BLD: 7.7 % (ref 4–15)
NEUTROPHILS # BLD AUTO: 1.1 K/UL (ref 1.8–7.7)
NEUTROPHILS NFR BLD: 29.3 % (ref 38–73)
NRBC BLD-RTO: 0 /100 WBC
PLATELET # BLD AUTO: 118 K/UL (ref 150–350)
PMV BLD AUTO: 10.4 FL (ref 9.2–12.9)
POTASSIUM SERPL-SCNC: 4.6 MMOL/L (ref 3.5–5.1)
PROT SERPL-MCNC: 6.7 G/DL (ref 6–8.4)
RBC # BLD AUTO: 4.37 M/UL (ref 4.6–6.2)
SODIUM SERPL-SCNC: 140 MMOL/L (ref 136–145)
WBC # BLD AUTO: 3.76 K/UL (ref 3.9–12.7)

## 2019-07-29 PROCEDURE — 99213 OFFICE O/P EST LOW 20 MIN: CPT | Mod: S$GLB,,, | Performed by: INTERNAL MEDICINE

## 2019-07-29 PROCEDURE — 99999 PR PBB SHADOW E&M-EST. PATIENT-LVL III: CPT | Mod: PBBFAC,,, | Performed by: INTERNAL MEDICINE

## 2019-07-29 PROCEDURE — 85025 COMPLETE CBC W/AUTO DIFF WBC: CPT

## 2019-07-29 PROCEDURE — 1101F PR PT FALLS ASSESS DOC 0-1 FALLS W/OUT INJ PAST YR: ICD-10-PCS | Mod: CPTII,S$GLB,, | Performed by: INTERNAL MEDICINE

## 2019-07-29 PROCEDURE — 99213 PR OFFICE/OUTPT VISIT, EST, LEVL III, 20-29 MIN: ICD-10-PCS | Mod: S$GLB,,, | Performed by: INTERNAL MEDICINE

## 2019-07-29 PROCEDURE — 36415 COLL VENOUS BLD VENIPUNCTURE: CPT | Mod: PO

## 2019-07-29 PROCEDURE — 1101F PT FALLS ASSESS-DOCD LE1/YR: CPT | Mod: CPTII,S$GLB,, | Performed by: INTERNAL MEDICINE

## 2019-07-29 PROCEDURE — 80053 COMPREHEN METABOLIC PANEL: CPT

## 2019-07-29 PROCEDURE — 83036 HEMOGLOBIN GLYCOSYLATED A1C: CPT

## 2019-07-29 PROCEDURE — 99999 PR PBB SHADOW E&M-EST. PATIENT-LVL III: ICD-10-PCS | Mod: PBBFAC,,, | Performed by: INTERNAL MEDICINE

## 2019-07-29 RX ORDER — WARFARIN 1 MG/1
TABLET ORAL
Qty: 30 TABLET | Refills: 3 | Status: SHIPPED | OUTPATIENT
Start: 2019-07-29 | End: 2019-10-25 | Stop reason: SDUPTHER

## 2019-07-29 RX ORDER — WARFARIN SODIUM 5 MG/1
TABLET ORAL
Qty: 30 TABLET | Refills: 3 | Status: SHIPPED | OUTPATIENT
Start: 2019-07-29

## 2019-07-29 RX ORDER — METOPROLOL TARTRATE 25 MG/1
25 TABLET, FILM COATED ORAL 2 TIMES DAILY
Qty: 180 TABLET | Refills: 2 | Status: ON HOLD | OUTPATIENT
Start: 2019-07-29 | End: 2020-01-25 | Stop reason: HOSPADM

## 2019-07-29 RX ORDER — HYDROCODONE BITARTRATE AND ACETAMINOPHEN 7.5; 325 MG/1; MG/1
1 TABLET ORAL EVERY 6 HOURS PRN
Qty: 30 TABLET | Refills: 0 | Status: ON HOLD | OUTPATIENT
Start: 2019-07-29 | End: 2020-01-24 | Stop reason: HOSPADM

## 2019-07-29 NOTE — PROGRESS NOTES
CC: followup of hypertension and diabetes  HPI:  The patient is a 88 y.o. year old male who presents to the office for followup of hypertension and diabetes.  The patient denies any chest pain, shortness of breath, headache, excessive fatigue, nausea or vomiting.  His daughter reports that they recently moved, and all of his medication has been displaced.  He complains of neck pain.  He was holding his head up yesterday, but usually keeps his neck bent.  They are currently staying with family, and will notify us when they get a permanent residence.    PAST MEDICAL HISTORY:  Past Medical History:   Diagnosis Date    *Atrial fibrillation     Diabetes mellitus type II     Glaucoma     Hyperlipidemia     Hypertension     Kyphosis        SURGICAL HISTORY:  Past Surgical History:   Procedure Laterality Date    CHOLECYSTECTOMY         MEDS:  Medcard reviewed and updated    ALLERGIES: Allergy Card reviewed and updated    SOCIAL HISTORY:   The patient is a nonsmoker.    PE:   APPEARANCE: Well nourished, well developed, in no acute distress.    CHEST: Lungs clear to auscultation with unlabored respirations.  CARDIOVASCULAR: Normal S1, S2. No murmurs. No carotid bruits. No pedal edema.  ABDOMEN: Bowel sounds normal. Not distended. Soft. No tenderness or masses.   PSYCHIATRIC: The patient is oriented to person, place, and time and has a pleasant affect.        ASSESSMENT/PLAN:  Chris was seen today for follow-up.    Diagnoses and all orders for this visit:    Neck pain  -     HYDROcodone-acetaminophen (NORCO) 7.5-325 mg per tablet; Take 1 tablet by mouth every 6 (six) hours as needed for Pain.    Knee pain, unspecified chronicity, unspecified laterality  -     HYDROcodone-acetaminophen (NORCO) 7.5-325 mg per tablet; Take 1 tablet by mouth every 6 (six) hours as needed for Pain.    Chronic atrial fibrillation  -     CBC auto differential; Future    Type 2 diabetes mellitus with complication, without long-term current use  of insulin  -     Comprehensive metabolic panel; Future  -     Hemoglobin A1c; Future    Other orders  -     warfarin (COUMADIN) 5 MG tablet; take 1 tablet by mouth once daily  -     warfarin (COUMADIN) 1 MG tablet; TAKE 1 TABLET BY MOUTH EVERY DAY  -     metoprolol tartrate (LOPRESSOR) 25 MG tablet; Take 1 tablet (25 mg total) by mouth 2 (two) times daily.

## 2019-08-01 ENCOUNTER — TELEPHONE (OUTPATIENT)
Dept: INTERNAL MEDICINE | Facility: CLINIC | Age: 84
End: 2019-08-01

## 2019-08-01 NOTE — TELEPHONE ENCOUNTER
Please inform patient that labs are stable.  Hemoglobin A1c is normal.  There is no need to restart diabetes medication.

## 2019-08-01 NOTE — TELEPHONE ENCOUNTER
Called and spoke with Ronal and she states she has been stressed out and things have not been the same she has been moving and she think she has packed the pills in storage advised she needs to go there the weekend and see if she can find the meds as she has filled it. Termed the call

## 2019-09-09 DIAGNOSIS — M25.569 KNEE PAIN, UNSPECIFIED CHRONICITY, UNSPECIFIED LATERALITY: ICD-10-CM

## 2019-09-09 RX ORDER — HYDROCODONE BITARTRATE AND ACETAMINOPHEN 7.5; 325 MG/1; MG/1
1 TABLET ORAL EVERY 6 HOURS PRN
Qty: 30 TABLET | Refills: 0 | Status: SHIPPED | OUTPATIENT
Start: 2019-09-09 | End: 2019-10-17 | Stop reason: SDUPTHER

## 2019-09-27 ENCOUNTER — TELEPHONE (OUTPATIENT)
Dept: INTERNAL MEDICINE | Facility: CLINIC | Age: 84
End: 2019-09-27

## 2019-09-27 NOTE — TELEPHONE ENCOUNTER
Called and spoke with Ronal and gave new information. She will call if further assistance is needed.

## 2019-09-27 NOTE — TELEPHONE ENCOUNTER
----- Message from Judi Schaeffer sent at 9/27/2019  9:24 AM CDT -----  Contact: pt Daughter Ronal [phone]  Pt called stating that she hs moved to [address] [place] [address] and would like to have home health sent to that location for patient.  Please advise

## 2019-10-17 DIAGNOSIS — M25.569 KNEE PAIN, UNSPECIFIED CHRONICITY, UNSPECIFIED LATERALITY: ICD-10-CM

## 2019-10-17 RX ORDER — HYDROCODONE BITARTRATE AND ACETAMINOPHEN 7.5; 325 MG/1; MG/1
1 TABLET ORAL EVERY 6 HOURS PRN
Qty: 30 TABLET | Refills: 0 | Status: ON HOLD | OUTPATIENT
Start: 2019-10-17 | End: 2020-01-24 | Stop reason: HOSPADM

## 2019-10-17 NOTE — TELEPHONE ENCOUNTER
----- Message from Argentina Yen sent at 10/17/2019  9:36 AM CDT -----  Contact: daughter/chantal/[phone]  Pt daughter  called in regards to getting a Rx refill for     Hydrocodone-acetaminophen (NORCO) 7.5-325 mg per tablet 30 tablet 0 9/9/2019 No Take 1 tablet by mouth every 6 (six) hours as needed for Pain    [facility] [phone]  Please advise

## 2019-10-25 RX ORDER — WARFARIN 1 MG/1
TABLET ORAL
Qty: 90 TABLET | Refills: 3 | Status: SHIPPED | OUTPATIENT
Start: 2019-10-25

## 2019-10-30 ENCOUNTER — TELEPHONE (OUTPATIENT)
Dept: INTERNAL MEDICINE | Facility: CLINIC | Age: 84
End: 2019-10-30

## 2019-10-30 NOTE — TELEPHONE ENCOUNTER
Called to speak with the Ronal and she advised [facility] does not accept his insurance advised she can call the insurance company and they will advise what facility he can use I also advised her to call  and have them enter his new insurance information and call office back and leave a message with the name of the facility she agreed and termed the call

## 2020-01-17 ENCOUNTER — TELEPHONE (OUTPATIENT)
Dept: INTERNAL MEDICINE | Facility: CLINIC | Age: 85
End: 2020-01-17

## 2020-01-17 RX ORDER — AMOXICILLIN 500 MG/1
500 TABLET, FILM COATED ORAL EVERY 12 HOURS
Qty: 20 TABLET | Refills: 0 | Status: ON HOLD | OUTPATIENT
Start: 2020-01-17 | End: 2020-01-24 | Stop reason: HOSPADM

## 2020-01-17 NOTE — TELEPHONE ENCOUNTER
----- Message from Phil Jarrett sent at 1/17/2020 10:40 AM CST -----  Contact: Daughter Ronal [phone]  Patient would like to get medical advice.  Symptoms (please be specific):  Cough cold   How long has patient had these symptoms:  Couple of Days  Pharmacy name and phone #:  [id number] [address] AT [facility] [phone] (Phone) [phone] (Fax)     Comments: daughter calling stating patient has had cough cold for couple of days and would like Rx sent to pharmacy above, call to inform has been sent.    Please call an advise  Thank you

## 2020-01-17 NOTE — TELEPHONE ENCOUNTER
Please inform patient that prescription for amoxicillin has been sent to the pharmacy electronically.

## 2020-01-21 ENCOUNTER — HOSPITAL ENCOUNTER (INPATIENT)
Facility: OTHER | Age: 85
LOS: 4 days | Discharge: HOME HEALTH | DRG: 194 | End: 2020-01-25
Attending: EMERGENCY MEDICINE | Admitting: INTERNAL MEDICINE
Payer: MEDICARE

## 2020-01-21 DIAGNOSIS — R53.81 DEBILITY: ICD-10-CM

## 2020-01-21 DIAGNOSIS — J10.1 INFLUENZA A: ICD-10-CM

## 2020-01-21 DIAGNOSIS — I87.2 VENOUS INSUFFICIENCY: ICD-10-CM

## 2020-01-21 DIAGNOSIS — R00.0 TACHYCARDIA: ICD-10-CM

## 2020-01-21 DIAGNOSIS — L89.150 PRESSURE INJURY OF SACRAL REGION, UNSTAGEABLE: ICD-10-CM

## 2020-01-21 DIAGNOSIS — J18.9 PNEUMONIA OF LEFT LOWER LOBE DUE TO INFECTIOUS ORGANISM: Primary | ICD-10-CM

## 2020-01-21 DIAGNOSIS — R26.81 GAIT INSTABILITY: ICD-10-CM

## 2020-01-21 PROBLEM — I10 ESSENTIAL HYPERTENSION: Status: ACTIVE | Noted: 2020-01-21

## 2020-01-21 LAB
ALBUMIN SERPL BCP-MCNC: 2.9 G/DL (ref 3.5–5.2)
ALP SERPL-CCNC: 67 U/L (ref 55–135)
ALT SERPL W/O P-5'-P-CCNC: 10 U/L (ref 10–44)
ANION GAP SERPL CALC-SCNC: 9 MMOL/L (ref 8–16)
ANISOCYTOSIS BLD QL SMEAR: SLIGHT
APTT BLDCRRT: 35.3 SEC (ref 21–32)
AST SERPL-CCNC: 23 U/L (ref 10–40)
BASOPHILS # BLD AUTO: ABNORMAL K/UL (ref 0–0.2)
BASOPHILS NFR BLD: 0 % (ref 0–1.9)
BILIRUB DIRECT SERPL-MCNC: 1.1 MG/DL (ref 0.1–0.3)
BILIRUB SERPL-MCNC: 2 MG/DL (ref 0.1–1)
BNP SERPL-MCNC: 74 PG/ML (ref 0–99)
BUN SERPL-MCNC: 25 MG/DL (ref 8–23)
CALCIUM SERPL-MCNC: 10 MG/DL (ref 8.7–10.5)
CHLORIDE SERPL-SCNC: 101 MMOL/L (ref 95–110)
CO2 SERPL-SCNC: 31 MMOL/L (ref 23–29)
CREAT SERPL-MCNC: 0.8 MG/DL (ref 0.5–1.4)
CTP QC/QA: YES
DACRYOCYTES BLD QL SMEAR: ABNORMAL
DIFFERENTIAL METHOD: ABNORMAL
EOSINOPHIL # BLD AUTO: ABNORMAL K/UL (ref 0–0.5)
EOSINOPHIL NFR BLD: 1 % (ref 0–8)
ERYTHROCYTE [DISTWIDTH] IN BLOOD BY AUTOMATED COUNT: 14.3 % (ref 11.5–14.5)
EST. GFR  (AFRICAN AMERICAN): >60 ML/MIN/1.73 M^2
EST. GFR  (NON AFRICAN AMERICAN): >60 ML/MIN/1.73 M^2
EXTRA GOLD TOP RAINBOW: NORMAL
EXTRA GREEN TOP RAINBOW: NORMAL
EXTRA LAVENDER TOP RAINBOW: NORMAL
GLUCOSE SERPL-MCNC: 107 MG/DL (ref 70–110)
HCT VFR BLD AUTO: 36.2 % (ref 40–54)
HGB BLD-MCNC: 12.3 G/DL (ref 14–18)
HYPOCHROMIA BLD QL SMEAR: ABNORMAL
IMM GRANULOCYTES # BLD AUTO: ABNORMAL K/UL (ref 0–0.04)
IMM GRANULOCYTES NFR BLD AUTO: ABNORMAL % (ref 0–0.5)
INR PPP: 1 (ref 0.8–1.2)
INR PPP: 1.1 (ref 0.8–1.2)
LACTATE SERPL-SCNC: 1.4 MMOL/L (ref 0.5–2.2)
LACTATE SERPL-SCNC: 1.5 MMOL/L (ref 0.5–2.2)
LYMPHOCYTES # BLD AUTO: ABNORMAL K/UL (ref 1–4.8)
LYMPHOCYTES NFR BLD: 9 % (ref 18–48)
MCH RBC QN AUTO: 27.2 PG (ref 27–31)
MCHC RBC AUTO-ENTMCNC: 34 G/DL (ref 32–36)
MCV RBC AUTO: 80 FL (ref 82–98)
MONOCYTES # BLD AUTO: ABNORMAL K/UL (ref 0.3–1)
MONOCYTES NFR BLD: 3 % (ref 4–15)
NEUTROPHILS NFR BLD: 87 % (ref 38–73)
NRBC BLD-RTO: 0 /100 WBC
PLATELET # BLD AUTO: 147 K/UL (ref 150–350)
PLATELET BLD QL SMEAR: ABNORMAL
PMV BLD AUTO: 11.2 FL (ref 9.2–12.9)
POC MOLECULAR INFLUENZA A AGN: POSITIVE
POC MOLECULAR INFLUENZA B AGN: NEGATIVE
POLYCHROMASIA BLD QL SMEAR: ABNORMAL
POTASSIUM SERPL-SCNC: 4.6 MMOL/L (ref 3.5–5.1)
PROCALCITONIN SERPL IA-MCNC: 0.36 NG/ML
PROT SERPL-MCNC: 7.7 G/DL (ref 6–8.4)
PROTHROMBIN TIME: 11.5 SEC (ref 9–12.5)
PROTHROMBIN TIME: 12.7 SEC (ref 9–12.5)
RBC # BLD AUTO: 4.53 M/UL (ref 4.6–6.2)
SODIUM SERPL-SCNC: 141 MMOL/L (ref 136–145)
TARGETS BLD QL SMEAR: ABNORMAL
WBC # BLD AUTO: 6.54 K/UL (ref 3.9–12.7)

## 2020-01-21 PROCEDURE — 96365 THER/PROPH/DIAG IV INF INIT: CPT

## 2020-01-21 PROCEDURE — 99285 EMERGENCY DEPT VISIT HI MDM: CPT | Mod: 25

## 2020-01-21 PROCEDURE — 93010 ELECTROCARDIOGRAM REPORT: CPT | Mod: ,,, | Performed by: INTERNAL MEDICINE

## 2020-01-21 PROCEDURE — 83880 ASSAY OF NATRIURETIC PEPTIDE: CPT

## 2020-01-21 PROCEDURE — 94761 N-INVAS EAR/PLS OXIMETRY MLT: CPT

## 2020-01-21 PROCEDURE — 85007 BL SMEAR W/DIFF WBC COUNT: CPT

## 2020-01-21 PROCEDURE — 25000003 PHARM REV CODE 250: Performed by: EMERGENCY MEDICINE

## 2020-01-21 PROCEDURE — 87186 SC STD MICRODIL/AGAR DIL: CPT

## 2020-01-21 PROCEDURE — 63600175 PHARM REV CODE 636 W HCPCS: Performed by: PHYSICIAN ASSISTANT

## 2020-01-21 PROCEDURE — 85610 PROTHROMBIN TIME: CPT

## 2020-01-21 PROCEDURE — 85027 COMPLETE CBC AUTOMATED: CPT

## 2020-01-21 PROCEDURE — 25000003 PHARM REV CODE 250: Performed by: PHYSICIAN ASSISTANT

## 2020-01-21 PROCEDURE — 63600175 PHARM REV CODE 636 W HCPCS: Performed by: EMERGENCY MEDICINE

## 2020-01-21 PROCEDURE — 80053 COMPREHEN METABOLIC PANEL: CPT

## 2020-01-21 PROCEDURE — 83605 ASSAY OF LACTIC ACID: CPT | Mod: 91

## 2020-01-21 PROCEDURE — 82248 BILIRUBIN DIRECT: CPT

## 2020-01-21 PROCEDURE — 93005 ELECTROCARDIOGRAM TRACING: CPT

## 2020-01-21 PROCEDURE — 93010 EKG 12-LEAD: ICD-10-PCS | Mod: ,,, | Performed by: INTERNAL MEDICINE

## 2020-01-21 PROCEDURE — 11000001 HC ACUTE MED/SURG PRIVATE ROOM

## 2020-01-21 PROCEDURE — 85730 THROMBOPLASTIN TIME PARTIAL: CPT

## 2020-01-21 PROCEDURE — 99223 1ST HOSP IP/OBS HIGH 75: CPT | Mod: ,,, | Performed by: PHYSICIAN ASSISTANT

## 2020-01-21 PROCEDURE — 87205 SMEAR GRAM STAIN: CPT

## 2020-01-21 PROCEDURE — 87040 BLOOD CULTURE FOR BACTERIA: CPT

## 2020-01-21 PROCEDURE — 99223 PR INITIAL HOSPITAL CARE,LEVL III: ICD-10-PCS | Mod: ,,, | Performed by: PHYSICIAN ASSISTANT

## 2020-01-21 PROCEDURE — 96360 HYDRATION IV INFUSION INIT: CPT | Mod: 59

## 2020-01-21 PROCEDURE — 84145 PROCALCITONIN (PCT): CPT

## 2020-01-21 PROCEDURE — 85610 PROTHROMBIN TIME: CPT | Mod: 91

## 2020-01-21 PROCEDURE — 83036 HEMOGLOBIN GLYCOSYLATED A1C: CPT

## 2020-01-21 PROCEDURE — 87070 CULTURE OTHR SPECIMN AEROBIC: CPT

## 2020-01-21 PROCEDURE — 87077 CULTURE AEROBIC IDENTIFY: CPT

## 2020-01-21 RX ORDER — ACETAMINOPHEN 325 MG/1
650 TABLET ORAL EVERY 4 HOURS PRN
Status: DISCONTINUED | OUTPATIENT
Start: 2020-01-21 | End: 2020-01-25 | Stop reason: HOSPADM

## 2020-01-21 RX ORDER — IBUPROFEN 200 MG
16 TABLET ORAL
Status: DISCONTINUED | OUTPATIENT
Start: 2020-01-21 | End: 2020-01-25 | Stop reason: HOSPADM

## 2020-01-21 RX ORDER — GUAIFENESIN 100 MG/5ML
200 SOLUTION ORAL EVERY 4 HOURS PRN
Status: DISCONTINUED | OUTPATIENT
Start: 2020-01-21 | End: 2020-01-25 | Stop reason: HOSPADM

## 2020-01-21 RX ORDER — METOPROLOL TARTRATE 25 MG/1
25 TABLET, FILM COATED ORAL 2 TIMES DAILY
Status: DISCONTINUED | OUTPATIENT
Start: 2020-01-21 | End: 2020-01-24

## 2020-01-21 RX ORDER — HYDROCODONE BITARTRATE AND ACETAMINOPHEN 7.5; 325 MG/1; MG/1
1 TABLET ORAL EVERY 6 HOURS PRN
Status: DISCONTINUED | OUTPATIENT
Start: 2020-01-21 | End: 2020-01-25 | Stop reason: HOSPADM

## 2020-01-21 RX ORDER — ONDANSETRON 8 MG/1
8 TABLET, ORALLY DISINTEGRATING ORAL EVERY 8 HOURS PRN
Status: DISCONTINUED | OUTPATIENT
Start: 2020-01-21 | End: 2020-01-25 | Stop reason: HOSPADM

## 2020-01-21 RX ORDER — SODIUM CHLORIDE 0.9 % (FLUSH) 0.9 %
10 SYRINGE (ML) INJECTION
Status: DISCONTINUED | OUTPATIENT
Start: 2020-01-21 | End: 2020-01-25 | Stop reason: HOSPADM

## 2020-01-21 RX ORDER — BENZONATATE 100 MG/1
100 CAPSULE ORAL 3 TIMES DAILY PRN
Status: DISCONTINUED | OUTPATIENT
Start: 2020-01-21 | End: 2020-01-25 | Stop reason: HOSPADM

## 2020-01-21 RX ORDER — IBUPROFEN 200 MG
24 TABLET ORAL
Status: DISCONTINUED | OUTPATIENT
Start: 2020-01-21 | End: 2020-01-25 | Stop reason: HOSPADM

## 2020-01-21 RX ORDER — GLUCAGON 1 MG
1 KIT INJECTION
Status: DISCONTINUED | OUTPATIENT
Start: 2020-01-21 | End: 2020-01-25 | Stop reason: HOSPADM

## 2020-01-21 RX ORDER — SODIUM CHLORIDE 9 MG/ML
INJECTION, SOLUTION INTRAVENOUS CONTINUOUS
Status: DISCONTINUED | OUTPATIENT
Start: 2020-01-21 | End: 2020-01-25 | Stop reason: HOSPADM

## 2020-01-21 RX ORDER — OSELTAMIVIR PHOSPHATE 75 MG/1
75 CAPSULE ORAL
Status: COMPLETED | OUTPATIENT
Start: 2020-01-21 | End: 2020-01-21

## 2020-01-21 RX ORDER — SODIUM CHLORIDE 9 MG/ML
500 INJECTION, SOLUTION INTRAVENOUS
Status: COMPLETED | OUTPATIENT
Start: 2020-01-21 | End: 2020-01-21

## 2020-01-21 RX ORDER — INSULIN ASPART 100 [IU]/ML
0-5 INJECTION, SOLUTION INTRAVENOUS; SUBCUTANEOUS
Status: DISCONTINUED | OUTPATIENT
Start: 2020-01-21 | End: 2020-01-25 | Stop reason: HOSPADM

## 2020-01-21 RX ADMIN — AZITHROMYCIN MONOHYDRATE 500 MG: 500 INJECTION, POWDER, LYOPHILIZED, FOR SOLUTION INTRAVENOUS at 08:01

## 2020-01-21 RX ADMIN — METOPROLOL TARTRATE 25 MG: 25 TABLET ORAL at 10:01

## 2020-01-21 RX ADMIN — OSELTAMIVIR PHOSPHATE 75 MG: 75 CAPSULE ORAL at 08:01

## 2020-01-21 RX ADMIN — SODIUM CHLORIDE: 0.9 INJECTION, SOLUTION INTRAVENOUS at 10:01

## 2020-01-21 RX ADMIN — SODIUM CHLORIDE 500 ML: 0.9 INJECTION, SOLUTION INTRAVENOUS at 05:01

## 2020-01-21 RX ADMIN — CEFTRIAXONE 1 G: 1 INJECTION, SOLUTION INTRAVENOUS at 08:01

## 2020-01-21 NOTE — ED NOTES
Patient moved to ED room 6 via [facility], patient assisted onto stretcher and changed into a gown. Patient placed on cardiac monitor, continuous pulse oximetry and automatic blood pressure cuff. Bed placed in low locked position, side rails up x 2, call light is within reach of patient or family, orientation to room and explanation of wait provided to family and patient, alarms set and turned on for monitor and pulse ox, awaiting MD evaluation and orders, will continue to monitor.

## 2020-01-21 NOTE — ED PROVIDER NOTES
"Encounter Date: 1/21/2020    SCRIBE #1 NOTE: IOlga, am scribing for, and in the presence of, Dr. Edwards.       History     Chief Complaint   Patient presents with    Generalized Body Aches     Per [facility] transfered from home w/ reported from family of + increased generalzied body aches, weakness, fatigue, dry cough and fever x two days. Pt's family member states they " are all sick and don't want to give him pneumonia".      Time seen by provider: 4:58 PM    This is a 88 y.o. male who presents via EMS from home to be evaluated for a cough.  Patient reports that his cough is nonproductive. Per EMS report to triage nurse, family states they are "all sick" and want to get the pt evaluated for possible pneumonia. He denies abdominal pain, fever, or chills. Complaints are limited secondary to age and condition of the patient.     Discussed patient's HPI with his daughter Maureen.  She reports that the patient has had a nonproductive cough for the past few days, she has given him 3 doses of the amoxicillin that the PCP called in for him, but she is concerned that he has not improved.  Patient did not have a chest x-ray to confirm or rule out the presence of pneumonia.  States the patient has not had any fevers.    The history is provided by the patient and medical records. The history is limited by the condition of the patient.     Review of patient's allergies indicates:  No Known Allergies  Past Medical History:   Diagnosis Date    *Atrial fibrillation     Diabetes mellitus type II     Glaucoma     Hyperlipidemia     Hypertension     Kyphosis      Past Surgical History:   Procedure Laterality Date    CHOLECYSTECTOMY       Family History   Problem Relation Age of Onset    Melanoma Neg Hx      Social History     Tobacco Use    Smoking status: Former Smoker     Start date: 4/16/1984    Smokeless tobacco: Former User   Substance Use Topics    Alcohol use: No    Drug use: No     Review of Systems "   Constitutional: Negative for chills and fever.   HENT: Negative for congestion, rhinorrhea and sore throat.    Eyes: Negative for visual disturbance.   Respiratory: Positive for cough. Negative for shortness of breath.    Cardiovascular: Negative for chest pain.   Gastrointestinal: Negative for abdominal pain, diarrhea, nausea and vomiting.   Genitourinary: Negative for dysuria.   Musculoskeletal: Negative for back pain.   Skin: Negative for rash.   Neurological: Negative for dizziness, weakness and light-headedness.   Psychiatric/Behavioral: Negative for confusion.   All other systems reviewed and are negative.      Physical Exam     Initial Vitals [01/21/20 1630]   BP Pulse Resp Temp SpO2   (!) 95/52 96 18 97.8 °F (36.6 °C) 95 %      MAP       --         Physical Exam    Nursing note and vitals reviewed.  Constitutional: He appears well-developed and well-nourished. He is not diaphoretic. No distress.   HENT:   Head: Normocephalic and atraumatic.   Nose: Nose normal.   Eyes: Right eye exhibits no discharge. Left eye exhibits no discharge.   Blindness   Neck: Normal range of motion. Neck supple. No JVD present.   Cardiovascular: Normal rate, regular rhythm, normal heart sounds and intact distal pulses.   Pulmonary/Chest: Breath sounds normal. No respiratory distress. He has no wheezes. He has no rales.   Decreased breath sounds bilateral bases   Abdominal: Bowel sounds are normal. He exhibits no distension and no mass. There is no tenderness. There is no rebound and no guarding.   Musculoskeletal: He exhibits no edema or tenderness.   Neurological: He is alert.   Oriented to person and place, but not time   Skin: Skin is warm. Capillary refill takes less than 2 seconds.   Psychiatric: He has a normal mood and affect. Thought content normal.         ED Course   Procedures  Labs Reviewed   CBC W/ AUTO DIFFERENTIAL - Abnormal; Notable for the following components:       Result Value    RBC 4.53 (*)     Hemoglobin  12.3 (*)     Hematocrit 36.2 (*)     Mean Corpuscular Volume 80 (*)     Platelets 147 (*)     Gran% 87.0 (*)     Lymph% 9.0 (*)     Mono% 3.0 (*)     Platelet Estimate Decreased (*)     All other components within normal limits   COMPREHENSIVE METABOLIC PANEL - Abnormal; Notable for the following components:    CO2 31 (*)     BUN, Bld 25 (*)     Albumin 2.9 (*)     Total Bilirubin 2.0 (*)     All other components within normal limits   PROTIME-INR - Abnormal; Notable for the following components:    Prothrombin Time 12.7 (*)     All other components within normal limits   APTT - Abnormal; Notable for the following components:    aPTT 35.3 (*)     All other components within normal limits   POCT INFLUENZA A/B MOLECULAR - Abnormal; Notable for the following components:    POC Molecular Influenza A Ag Positive (*)     All other components within normal limits   CULTURE, BLOOD   CULTURE, BLOOD   B-TYPE NATRIURETIC PEPTIDE   GREEN-TOP TUBE   LAVENDER-TOP TUBE   GOLD-TOP TUBE   LACTIC ACID, PLASMA   LACTIC ACID, PLASMA   URINALYSIS, REFLEX TO URINE CULTURE     EKG Readings: (Independently Interpreted)   Initial Reading: No STEMI. Previous EKG Date: 9/22/17. Rhythm: Sinus Tachycardia. Heart Rate: 106.   Atrial fibrillation with RVR  Occasional PVC  Baseline artifact inhibits interpretation       Imaging Results          X-Ray Chest AP Portable (Final result)  Result time 01/21/20 18:27:39    Final result by Terence Barraza MD (01/21/20 18:27:39)                 Impression:      New ground-glass airspace opacities in the left mid and lower lung zones.      Electronically signed by: Terence Barraza MD  Date:    01/21/2020  Time:    18:27             Narrative:    EXAMINATION:  XR CHEST AP PORTABLE    CLINICAL HISTORY:  cough;    TECHNIQUE:  Single frontal view of the chest was performed.    COMPARISON:  10/20/2017.    FINDINGS:  Monitoring EKG leads are present.  There are postoperative changes in the left axillary region.  There  also postoperative changes in the right upper abdominal quadrant.  Portions of the lung apices are not evaluated due to patient's flexed position.    The cardiomediastinal silhouette is upper limits of normal.  The right hemidiaphragm is unremarkable.  There is elevation of the left hemidiaphragm.  There are no pleural effusions.  There is no evidence of a pneumothorax.  There are new ground-glass airspace opacities in the left mid and lower lung zones.  There are degenerative changes in the osseous structures.                              X-Rays:   Independently Interpreted Readings:   Chest X-Ray: Left upper and left lower infiltrates. No effusions. No pneumothorax.      Medical Decision Making:   History:   Old Medical Records: I decided to obtain old medical records.  Differential Diagnosis:   Meningitis, epidural abscess, central nervous system infection, epidural abscess, otitis media, otitis externa, acute bacterial sinusitis, viral syndrome, pharyngitis, deep neck space infection, epiglottitis, retropharyngeal infection, pneumonia, endocarditis, pericarditis, medistinitis, rheumatic fever, intrabdominal abscess, PID, TOA, UTI, acute pyelonephritis, lymphoma, sarcoid, lupus, rheumatoid arthritis, giant cell arteritis, drug fever, factitious fever, acute respiratory distress syndrome, aspirin overdose, anticholinergic syndrome, drug withdrawal, gout, heat stroke, intracranial hemorrhage, ischemic colitis, malignancy, malignant hyperthermia, myocardial infarction, neuroleptic malignant syndrome, pheochromocytoma,serotonin syndrome, thromboembolic disease, vasculitis, acalculous cholecystitis, adrenal insufficiency, benign post-operative fever, pancreatitis, thyroid storm, transfusion reaction    Independently Interpreted Test(s):   I have ordered and independently interpreted X-rays - see prior notes.  I have ordered and independently interpreted EKG Reading(s) - see prior notes  Clinical Tests:   Lab Tests:  Ordered and Reviewed  Radiological Study: Ordered and Reviewed  Medical Tests: Ordered and Reviewed  ED Management:  80-year-old male with pneumonia and 2 lobes of his lung along with positive influenza. He is in the severe risk group: 14.0% 30-day mortality via Curb 65 score, will have patient admitted by Hospital Medicine and initiate antibiotics.    Case discussed with care manager, patient meets inpatient criteria.  Case discussed with hospital medicine, will admit inpatient.               Scribe Attestation:   Scribe #1: I performed the above scribed service and the documentation accurately describes the services I performed. I attest to the accuracy of the note.    Attending Attestation:           Physician Attestation for Scribe:  Physician Attestation Statement for Scribe #1: I, Dr. Edwards, reviewed documentation, as scribed by Olga Wilkins in my presence, and it is both accurate and complete.                               Clinical Impression:     1. Pneumonia of left lower lobe due to infectious organism    2. Tachycardia    3. Influenza A                                Martir Edwards MD  01/22/20 0047

## 2020-01-21 NOTE — ED TRIAGE NOTES
Patient presents to ER via [facility] w/ reports of generalized weakness, fatigue, body aches, HA, nasal drip and dry non-productive cough x two days. JESSICA reported numerous family members of patient  Have flu like symptoms at home and don't want to get him sick. Pt currently aaox4.

## 2020-01-22 PROBLEM — L89.150 PRESSURE INJURY OF SACRAL REGION, UNSTAGEABLE: Status: ACTIVE | Noted: 2020-01-22

## 2020-01-22 LAB
ANION GAP SERPL CALC-SCNC: 9 MMOL/L (ref 8–16)
BACTERIA #/AREA URNS HPF: ABNORMAL /HPF
BASOPHILS # BLD AUTO: 0.01 K/UL (ref 0–0.2)
BASOPHILS NFR BLD: 0.2 % (ref 0–1.9)
BILIRUB UR QL STRIP: ABNORMAL
BUN SERPL-MCNC: 20 MG/DL (ref 8–23)
CALCIUM SERPL-MCNC: 8.6 MG/DL (ref 8.7–10.5)
CHLORIDE SERPL-SCNC: 106 MMOL/L (ref 95–110)
CLARITY UR: CLEAR
CO2 SERPL-SCNC: 26 MMOL/L (ref 23–29)
COLOR UR: YELLOW
CREAT SERPL-MCNC: 0.6 MG/DL (ref 0.5–1.4)
DIFFERENTIAL METHOD: ABNORMAL
EOSINOPHIL # BLD AUTO: 0 K/UL (ref 0–0.5)
EOSINOPHIL NFR BLD: 0.5 % (ref 0–8)
ERYTHROCYTE [DISTWIDTH] IN BLOOD BY AUTOMATED COUNT: 13.9 % (ref 11.5–14.5)
EST. GFR  (AFRICAN AMERICAN): >60 ML/MIN/1.73 M^2
EST. GFR  (NON AFRICAN AMERICAN): >60 ML/MIN/1.73 M^2
ESTIMATED AVG GLUCOSE: 88 MG/DL (ref 68–131)
GLUCOSE SERPL-MCNC: 83 MG/DL (ref 70–110)
GLUCOSE UR QL STRIP: NEGATIVE
HBA1C MFR BLD HPLC: 4.7 % (ref 4–5.6)
HCT VFR BLD AUTO: 32.7 % (ref 40–54)
HGB BLD-MCNC: 10.9 G/DL (ref 14–18)
HGB UR QL STRIP: ABNORMAL
HYALINE CASTS #/AREA URNS LPF: 0 /LPF
IMM GRANULOCYTES # BLD AUTO: 0.04 K/UL (ref 0–0.04)
IMM GRANULOCYTES NFR BLD AUTO: 0.7 % (ref 0–0.5)
INR PPP: 1.1 (ref 0.8–1.2)
KETONES UR QL STRIP: NEGATIVE
LEUKOCYTE ESTERASE UR QL STRIP: NEGATIVE
LYMPHOCYTES # BLD AUTO: 1 K/UL (ref 1–4.8)
LYMPHOCYTES NFR BLD: 16.9 % (ref 18–48)
MCH RBC QN AUTO: 26.8 PG (ref 27–31)
MCHC RBC AUTO-ENTMCNC: 33.3 G/DL (ref 32–36)
MCV RBC AUTO: 80 FL (ref 82–98)
MICROSCOPIC COMMENT: ABNORMAL
MONOCYTES # BLD AUTO: 0.5 K/UL (ref 0.3–1)
MONOCYTES NFR BLD: 8.7 % (ref 4–15)
NEUTROPHILS # BLD AUTO: 4.3 K/UL (ref 1.8–7.7)
NEUTROPHILS NFR BLD: 73 % (ref 38–73)
NITRITE UR QL STRIP: POSITIVE
NRBC BLD-RTO: 0 /100 WBC
PH UR STRIP: 6 [PH] (ref 5–8)
PLATELET # BLD AUTO: 134 K/UL (ref 150–350)
PMV BLD AUTO: 10.8 FL (ref 9.2–12.9)
POCT GLUCOSE: 119 MG/DL (ref 70–110)
POCT GLUCOSE: 80 MG/DL (ref 70–110)
POCT GLUCOSE: 89 MG/DL (ref 70–110)
POTASSIUM SERPL-SCNC: 3.2 MMOL/L (ref 3.5–5.1)
PROT UR QL STRIP: ABNORMAL
PROTHROMBIN TIME: 11.7 SEC (ref 9–12.5)
RBC # BLD AUTO: 4.07 M/UL (ref 4.6–6.2)
RBC #/AREA URNS HPF: 10 /HPF (ref 0–4)
SODIUM SERPL-SCNC: 141 MMOL/L (ref 136–145)
SP GR UR STRIP: 1.02 (ref 1–1.03)
URN SPEC COLLECT METH UR: ABNORMAL
UROBILINOGEN UR STRIP-ACNC: >=8 EU/DL
WBC # BLD AUTO: 5.85 K/UL (ref 3.9–12.7)
WBC #/AREA URNS HPF: 3 /HPF (ref 0–5)

## 2020-01-22 PROCEDURE — 87632 RESP VIRUS 6-11 TARGETS: CPT

## 2020-01-22 PROCEDURE — 97166 OT EVAL MOD COMPLEX 45 MIN: CPT

## 2020-01-22 PROCEDURE — 85025 COMPLETE CBC W/AUTO DIFF WBC: CPT

## 2020-01-22 PROCEDURE — 97530 THERAPEUTIC ACTIVITIES: CPT

## 2020-01-22 PROCEDURE — 87449 NOS EACH ORGANISM AG IA: CPT

## 2020-01-22 PROCEDURE — 99233 SBSQ HOSP IP/OBS HIGH 50: CPT | Mod: ,,, | Performed by: INTERNAL MEDICINE

## 2020-01-22 PROCEDURE — 80048 BASIC METABOLIC PNL TOTAL CA: CPT

## 2020-01-22 PROCEDURE — 85610 PROTHROMBIN TIME: CPT

## 2020-01-22 PROCEDURE — 94761 N-INVAS EAR/PLS OXIMETRY MLT: CPT

## 2020-01-22 PROCEDURE — 25000003 PHARM REV CODE 250: Performed by: PHYSICIAN ASSISTANT

## 2020-01-22 PROCEDURE — 11000001 HC ACUTE MED/SURG PRIVATE ROOM

## 2020-01-22 PROCEDURE — 27000646 HC AEROBIKA DEVICE

## 2020-01-22 PROCEDURE — 36415 COLL VENOUS BLD VENIPUNCTURE: CPT

## 2020-01-22 PROCEDURE — 97161 PT EVAL LOW COMPLEX 20 MIN: CPT

## 2020-01-22 PROCEDURE — 99900035 HC TECH TIME PER 15 MIN (STAT)

## 2020-01-22 PROCEDURE — 81000 URINALYSIS NONAUTO W/SCOPE: CPT

## 2020-01-22 PROCEDURE — 94664 DEMO&/EVAL PT USE INHALER: CPT

## 2020-01-22 PROCEDURE — 63600175 PHARM REV CODE 636 W HCPCS: Performed by: PHYSICIAN ASSISTANT

## 2020-01-22 PROCEDURE — 99233 PR SUBSEQUENT HOSPITAL CARE,LEVL III: ICD-10-PCS | Mod: ,,, | Performed by: INTERNAL MEDICINE

## 2020-01-22 RX ORDER — OSELTAMIVIR PHOSPHATE 6 MG/ML
75 FOR SUSPENSION ORAL 2 TIMES DAILY
Status: DISCONTINUED | OUTPATIENT
Start: 2020-01-22 | End: 2020-01-25 | Stop reason: HOSPADM

## 2020-01-22 RX ADMIN — OSELTAMIVIR PHOSPHATE 75 MG: 6 POWDER, FOR SUSPENSION ORAL at 09:01

## 2020-01-22 RX ADMIN — WARFARIN SODIUM 6 MG: 5 TABLET ORAL at 05:01

## 2020-01-22 RX ADMIN — SODIUM CHLORIDE: 0.9 INJECTION, SOLUTION INTRAVENOUS at 03:01

## 2020-01-22 RX ADMIN — METOPROLOL TARTRATE 25 MG: 25 TABLET ORAL at 08:01

## 2020-01-22 RX ADMIN — HYDROCODONE BITARTRATE AND ACETAMINOPHEN 1 TABLET: 7.5; 325 TABLET ORAL at 09:01

## 2020-01-22 RX ADMIN — GUAIFENESIN 200 MG: 100 SOLUTION ORAL at 11:01

## 2020-01-22 RX ADMIN — HYDROCODONE BITARTRATE AND ACETAMINOPHEN 1 TABLET: 7.5; 325 TABLET ORAL at 03:01

## 2020-01-22 RX ADMIN — METOPROLOL TARTRATE 25 MG: 25 TABLET ORAL at 09:01

## 2020-01-22 RX ADMIN — OSELTAMIVIR PHOSPHATE 75 MG: 6 POWDER, FOR SUSPENSION ORAL at 11:01

## 2020-01-22 RX ADMIN — AZITHROMYCIN MONOHYDRATE 500 MG: 500 INJECTION, POWDER, LYOPHILIZED, FOR SOLUTION INTRAVENOUS at 10:01

## 2020-01-22 RX ADMIN — CEFTRIAXONE 1 G: 1 INJECTION, SOLUTION INTRAVENOUS at 08:01

## 2020-01-22 NOTE — NURSING
Patient arrived from ED. Has freuent not productive cough. Multiple wounds documented.  rooom air, alert and oriented x4, blind in both eyes.      Patient and family want to keep valuables in room

## 2020-01-22 NOTE — HPI
Mr. Chris Asencio is a 88 y.o. male, with PMH of AFib, hypertension, NIDDM-2, lymphedema, legal blindness, who [place] [facility] [place] via EMS from his home secondary to multiple viral illness type symptoms, primarily a nonproductive cough with fever x2 days.  EMS report included increased generalized body aches, weakness, fatigue.  He denies abdominal pain, chills.  His daughter endorsed that she treated with 3 doses of amoxicillin called in for him by his PCP.  But he has not had improvement.  He has not yet had a chest x-ray.  A family member in the home was noted to have said we are all sick and do not want to give him pneumonia.  In the ED he was evaluated with a chest x-ray showing left lingular and lower lobe pneumonia, he was started on Rocephin and azithromycin.  Labs did indicate that he is positive for influenza a, and he received 1st dose of Tamiflu in the ED.  He was admitted to inpatient status.

## 2020-01-22 NOTE — PROGRESS NOTES
[facility] Hospital Medicine  Progress Note    Patient Name: Chris Asencio  MRN: [id number]  Patient Class: IP- Inpatient   Admission Date: 1/21/2020  Length of Stay: 1 days  Attending Physician: Juan Kumar MD  Primary Care Provider: Arleen Garcia MD        Subjective:     Principal Problem:Pneumonia of left lower lobe due to infectious organism        HPI:  Mr. Chris Asencio is a 88 y.o. male, with PMH of AFib, hypertension, NIDDM-2, lymphedema, legal blindness, who presented to [facility] ED on 01/21/2020 via EMS from his home secondary to multiple viral illness type symptoms, primarily a nonproductive cough with fever x2 days.  EMS report included increased generalized body aches, weakness, fatigue.  He denies abdominal pain, chills.  His daughter endorsed that she treated with 3 doses of amoxicillin called in for him by his PCP.  But he has not had improvement.  He has not yet had a chest x-ray.  A family member in the home was noted to have said we are all sick and do not want to give him pneumonia.  In the ED he was evaluated with a chest x-ray showing left lingular and lower lobe pneumonia, he was started on Rocephin and azithromycin.  Labs did indicate that he is positive for influenza a, and he received 1st dose of Tamiflu in the ED.  He was admitted to inpatient status.    Overview/Hospital Course:  Patient overall feeling better since admission.  No reported adverse events overnight.    Past Medical History:   Diagnosis Date    *Atrial fibrillation     Diabetes mellitus type II     Glaucoma     Hyperlipidemia     Hypertension     Kyphosis        Past Surgical History:   Procedure Laterality Date    CHOLECYSTECTOMY         Review of patient's allergies indicates:  No Known Allergies    No current facility-administered medications on file prior to encounter.      Current Outpatient Medications on File Prior to Encounter   Medication Sig    warfarin (COUMADIN) 5 MG tablet  take 1 tablet by mouth once daily    amoxicillin (AMOXIL) 500 MG Tab Take 1 tablet (500 mg total) by mouth every 12 (twelve) hours. for 10 days    blood sugar diagnostic Strp 1 strip by Misc.(Non-Drug; Combo Route) route once daily.    blood-glucose meter kit Use as instructed    econazole nitrate 1 % cream AAA bid to feet    HYDROcodone-acetaminophen (NORCO) 7.5-325 mg per tablet Take 1 tablet by mouth every 6 (six) hours as needed for Pain.    HYDROcodone-acetaminophen (NORCO) 7.5-325 mg per tablet Take 1 tablet by mouth every 6 (six) hours as needed for Pain.    HYDROcodone-acetaminophen (NORCO) 7.5-325 mg per tablet Take 1 tablet by mouth every 6 (six) hours as needed for Pain.    lancets Misc 1 Units by Misc.(Non-Drug; Combo Route) route once daily.    metoprolol tartrate (LOPRESSOR) 25 MG tablet Take 1 tablet (25 mg total) by mouth 2 (two) times daily.    nystatin-triamcinolone (MYCOLOG II) cream Apply topically 4 (four) times daily.    warfarin (COUMADIN) 1 MG tablet TAKE 1 TABLET BY MOUTH EVERY DAY     Family History     None        Tobacco Use    Smoking status: Former Smoker     Start date: 4/16/1984    Smokeless tobacco: Former User   Substance and Sexual Activity    Alcohol use: No    Drug use: No    Sexual activity: Not Currently     Partners: Female     Review of Systems   Constitutional: Negative for activity change, appetite change, chills, diaphoresis and fever.   Eyes:        Patient is legally blind.    Respiratory: Positive for cough and shortness of breath. Negative for chest tightness and wheezing.    Cardiovascular: Negative for chest pain and palpitations.   Gastrointestinal: Negative for abdominal pain, constipation, diarrhea, nausea and vomiting.   Musculoskeletal: Negative for back pain, joint swelling, myalgias, neck pain and neck stiffness.   Skin: Negative for rash and wound.   Neurological: Negative for dizziness, weakness, light-headedness and headaches.    Hematological: Does not bruise/bleed easily.   Psychiatric/Behavioral: Negative for confusion and decreased concentration.     Objective:     Vital Signs (Most Recent):  Temp: 97.6 °F (36.4 °C) (01/22/20 0402)  Pulse: 87 (01/22/20 0408)  Resp: 16 (01/22/20 0402)  BP: (!) 113/56 (01/22/20 0402)  SpO2: 95 % (01/22/20 0402) Vital Signs (24h Range):  Temp:  [97.3 °F (36.3 °C)-98.2 °F (36.8 °C)] 97.6 °F (36.4 °C)  Pulse:  [] 87  Resp:  [10-29] 16  SpO2:  [80 %-96 %] 95 %  BP: ()/(52-60) 113/56     Weight: 70.3 kg (155 lb)  Body mass index is 22.89 kg/m².    Physical Exam   Constitutional: He is oriented to person, place, and time. Vital signs are normal. He appears well-developed and well-nourished.  Non-toxic appearance. He does not have a sickly appearance. He does not appear ill. No distress.   Thin, frail, elderly male. Very dry skin.    HENT:   Head: Normocephalic and atraumatic.   Right Ear: External ear normal.   Left Ear: External ear normal.   Tacky oral mucous membranes.   Eyes: Pupils are equal, round, and reactive to light. Conjunctivae and EOM are normal. No scleral icterus.   Neck: Normal range of motion. Neck supple. No JVD present. No tracheal deviation present.   Cardiovascular: Normal rate, regular rhythm, normal heart sounds and intact distal pulses. Exam reveals no gallop and no friction rub.   No murmur heard.  Pulmonary/Chest: Effort normal. No stridor. No respiratory distress. He has wheezes. He has rales (diffuse in left lower and mid lung fields.).   Abdominal: Soft. Bowel sounds are normal. He exhibits no distension and no mass. There is no tenderness. There is no guarding.   Neurological: He is alert and oriented to person, place, and time. No cranial nerve deficit. He exhibits normal muscle tone. Coordination normal.   Skin: Skin is warm and dry. He is not diaphoretic.   Psychiatric: He has a normal mood and affect. His behavior is normal. Judgment and thought content normal.    Nursing note and vitals reviewed.        CRANIAL NERVES     CN III, IV, VI   Pupils are equal, round, and reactive to light.  Extraocular motions are normal.        Significant Labs:   BMP:   Recent Labs   Lab 01/21/20  1737         K 4.6      CO2 31*   BUN 25*   CREATININE 0.8   CALCIUM 10.0     CBC:   Recent Labs   Lab 01/21/20  1737   WBC 6.54   HGB 12.3*   HCT 36.2*   *     CMP:   Recent Labs   Lab 01/21/20  1737      K 4.6      CO2 31*      BUN 25*   CREATININE 0.8   CALCIUM 10.0   PROT 7.7   ALBUMIN 2.9*   BILITOT 2.0*   ALKPHOS 67   AST 23   ALT 10   ANIONGAP 9   EGFRNONAA >60     Urine Culture: No results for input(s): LABURIN in the last 48 hours.  Urine Studies: No results for input(s): COLORU, APPEARANCEUA, PHUR, SPECGRAV, PROTEINUA, GLUCUA, KETONESU, BILIRUBINUA, OCCULTUA, NITRITE, UROBILINOGEN, LEUKOCYTESUR, RBCUA, WBCUA, BACTERIA, SQUAMEPITHEL, HYALINECASTS in the last 48 hours.    Invalid input(s): WRIGHTSUR  All pertinent labs within the past 24 hours have been reviewed.    Significant Imaging: I have reviewed all pertinent imaging results/findings within the past 24 hours.   Imaging Results          X-Ray Chest AP Portable (Final result)  Result time 01/21/20 18:27:39    Final result by Terence Barraza MD (01/21/20 18:27:39)                 Impression:      New ground-glass airspace opacities in the left mid and lower lung zones.      Electronically signed by: Terence Barraza MD  Date:    01/21/2020  Time:    18:27             Narrative:    EXAMINATION:  XR CHEST AP PORTABLE    CLINICAL HISTORY:  cough;    TECHNIQUE:  Single frontal view of the chest was performed.    COMPARISON:  10/20/2017.    FINDINGS:  Monitoring EKG leads are present.  There are postoperative changes in the left axillary region.  There also postoperative changes in the right upper abdominal quadrant.  Portions of the lung apices are not evaluated due to patient's flexed position.    The  cardiomediastinal silhouette is upper limits of normal.  The right hemidiaphragm is unremarkable.  There is elevation of the left hemidiaphragm.  There are no pleural effusions.  There is no evidence of a pneumothorax.  There are new ground-glass airspace opacities in the left mid and lower lung zones.  There are degenerative changes in the osseous structures.                                   Assessment/Plan:   Patient is a 88 y.o. male, with PMH of AFib, hypertension, NIDDM-2, lymphedema, legal blindness, who [place] [facility] [place] via EMS from his home secondary to multiple viral illness type symptoms, primarily a nonproductive cough with fever x2 days.  EMS report included increased generalized body aches, weakness, fatigue.  He denies abdominal pain, chills.  His daughter endorsed that she treated with 3 doses of amoxicillin called in for him by his PCP.  But he has not had improvement.  He has not yet had a chest x-ray.  A family member in the home was noted to have said we are all sick and do not want to give him pneumonia.  In the ED he was evaluated with a chest x-ray showing left lingular and lower lobe pneumonia, he was started on Rocephin and azithromycin.  Labs did indicate that he is positive for influenza a, and he received 1st dose of Tamiflu in the ED.  He was admitted to inpatient status.    -ID/Pulmonary - LLL PNA and Influenza A.  On Ceftriaxone/Azithromycin and Tamiflu.  Legionella Ag pending.  Lactate normal on admission.  Procalcitonin slightly elevated at 0.36.  WBC normal on admission.  CURB 65 was 2 points (moderate risk).  Feeling a little better this morning.    CXR on 1/21/2020 -New ground-glass airspace opacities in the left mid and lower lung zones.     -Cards - HTN.  Afib.  HFrEF.  On Metoprolol.  On Warfarin.  BNP normal on admission.  INR 1.0 on admission.  Not on an ARB.  Needs follow up TTE as outpatient.    TTE in 5/2014 - Mild left atrial enlargement.     2 -  Mildly to moderately depressed left ventricular systolic function (EF 40-45%).     3 - Normal left ventricular diastolic function.     4 - Normal right ventricular systolic function .     5 - Mild mitral regurgitation.     6 - Intermediate central venous pressure.     -Endocrine - T2D.  SSI.  A1C 4.7 on admission.    -MSK - Pressure Ulcers - Wound Consult placed.    -Dispo - will consult PT/OT for evaluation.    VTE Risk Mitigation (From admission, onward)         Ordered     warfarin tablet 6 mg  Daily      01/22/20 0529     Place NOVA hose  Until discontinued      01/21/20 2205     Place sequential compression device  Until discontinued      01/21/20 2205     IP VTE HIGH RISK PATIENT  Once      01/21/20 2205     Place sequential compression device  Until discontinued      01/21/20 2205     Reason for No Pharmacological VTE Prophylaxis  Once     Question:  Reasons:  Answer:  Already adequately anticoagulated on oral Anticoagulants    01/21/20 2205                      Juan Kumar MD  Department of Hospital Medicine   [facility]

## 2020-01-22 NOTE — PLAN OF CARE
Patient remains on RA sat's 95% with no distress AeroBika done tolerated well.Will continue to monitor.

## 2020-01-22 NOTE — PT/OT/SLP RE-EVAL
Occupational Therapy  Evaluation    Name: Chris Asencio  MRN: [id number]  Admitting Diagnosis:  Pneumonia of left lower lobe due to infectious organism      Recommendations:     Discharge Recommendations: home health PT, home health OT  Discharge Equipment Recommendations:  bedside commode(pressure relieving mattress)  Barriers to discharge:  None    Assessment:     Chris Asencio is a 88 y.o. male with a medical diagnosis of Pneumonia of left lower lobe due to infectious organism.  He presents with no concerns..  Performance deficits affecting function are impaired endurance, impaired self care skills, impaired functional mobilty, gait instability, impaired balance, impaired cognition, decreased lower extremity function, decreased upper extremity function, decreased safety awareness, visual deficits, decreased coordination, impaired skin, decreased ROM.  He oriented to self, cooperative with structure, slow and cautious in performing tasks.  He was Min A bed mobility, Max A bed><chair squat-pivot transfer, Mod A G/H seated, Max A UBD and LBD    Rehab Prognosis:  fair; patient would benefit from acute skilled OT services to address these deficits and reach maximum level of function.       Plan:     Patient to be seen 5 x/week to address the above listed problems via self-care/home management, therapeutic activities, therapeutic exercises  · Plan of Care Expires: 02/22/20  · Plan of Care Reviewed with: patient, daughter, family    Subjective     Chief Complaint: none  Patient/Family stated goals: family would like for him to be able to walk again, pt has no reported goals    Pain/Comfort:  · Pain Rating 1: 0/10  · Pain Rating Post-Intervention 2: 0/10    Objective:     Communicated with: patient and his nurse prior to session.  Patient found left sidelying with: booker catheter, telemetry, peripheral IV upon OT entry to room.  Pt and his family were agreeable to the OT evaluation.    Pt History:  Pt lives with his  daughter and grandson in a single story house with 1 ROSALEE at entry  He is largely W/C bound (family trys got get pt up into W/C daily but pt often refuses)  He is  Total A bathing and LBD, SBA UBD, feeding, and toilet hygiene (pt needs more assist than he allows family to perform)  He has full time family assist at discharge.    General Precautions: Standard, blind, fall   Orthopedic Precautions:N/A   Braces: N/A     Occupational Performance:    Bed Mobility:         Min A roll to Right        Min A roll to Left   · Min A supine>sit EOB  · SBA sit>supine    Functional Mobility/Transfers:  · Mod A sit><squat stand  · Total A (Mod A x1,Min A x1) bed><chair squat-pivot transfer   ·   · Functional Mobility: steps EOB with Mod A to retain balance in quat posture     Activities of Daily Living:  · Max A G/H (wash face hands (poor quality, frequent VC for completeness and to stay on task  · Max A UBD (don hospital gown as robe) sitting EOB  · Max A LBD (don socks) sitting EOB    Cognitive/Visual Perceptual:  Cognitive/Psychosocial Skills:     -       Oriented to: Person   -       Follows Commands/attention:Easily distracted, Follows one-step commands and with frequent VC to initiate and complete tasks  -       Communication: clear/fluent  -       Memory: Impaired STM and Poor immediate recall  -       Safety awareness/insight to disability: impaired   -       Mood/Affect/Coping skills/emotional control: Appropriate to situation and Cooperative  Visual/Perceptual:      -pt Blind since 1985     Physical Exam:  Balance:    -       fair static sitting balance EOB, Mod A to stand and retain standing, not able to attain erect stance due to hip and knee flexion contractures  Postural examination/scapula alignment:    -       Rounded shoulders  -       Forward head  -       Posterior pelvic tilt  -       Lateral weight shift of hips  -       Kyphosis  -       Scoliosis  Skin integrity: Wound sacral, toes Right foot  Edema:  None  noted  Sensation:    -     grossly  Intact for light touch both hands  Motor Planning:    -       fair  Dominant hand:    -       Right  UE ROM: grossly WFL for self-care BUE  UE Strength: 4/5 BUE  Hand Function: fair  strength and dexterity both hands    AMPAC 6 Click:  AMPAC Total Score: 11    Patient left right sidelying with call button in reach, nurse notified and bed alarn not working on bed, patient's family present    GOALS:   Multidisciplinary Problems     Occupational Therapy Goals        Problem: Occupational Therapy Goal    Goal Priority Disciplines Outcome Interventions   Occupational Therapy Goal     OT, PT/OT Ongoing, Progressing    Description:  Goals to be met by 1/29/20    Assess self-feeding  Min G/H (2 tasks) seated  Mod A UBD (patient's clothing)  [place] [facility] [place]:     Past Medical History:   Diagnosis Date    *Atrial fibrillation     Diabetes mellitus type II     Glaucoma     Hyperlipidemia     Hypertension     Kyphosis        Past Surgical History:   Procedure Laterality Date    CHOLECYSTECTOMY         Time Tracking:     OT Date of Treatment: 01/22/20  OT Start Time: 1312  OT Stop Time: 1400  OT Total Time (min): 48 min    Billable Minutes:Evaluation 48    Jorgito Braun, LOTR  1/22/2020

## 2020-01-22 NOTE — CONSULTS
Consulted for stage 3/4 pressure injury to sacral area.  The following history is copied from Magdalene Gant PA-C H&P on 1/21/2020:  Mr. Chris Asencio is a 88 y.o. male, with PMH of AFib, hypertension, NIDDM-2, lymphedema, legal blindness, who presented to [facility] [place] [facility] ED on 01/21/2020 via EMS from his home secondary to multiple viral illness type symptoms, primarily a nonproductive cough with fever x2 days.  EMS report included increased generalized body aches, weakness, fatigue.  He denies abdominal pain, chills.  His daughter endorsed that she treated with 3 doses of amoxicillin called in for him by his PCP.  But he has not had improvement.  He has not yet had a chest x-ray.  A family member in the home was noted to have said we are all sick and do not want to give him pneumonia.  In the ED he was evaluated with a chest x-ray showing left lingular and lower lobe pneumonia, he was started on Rocephin and azithromycin.  Labs did indicate that he is positive for influenza a, and he received 1st dose of Tamiflu in the ED.  He was admitted to inpatient status.  The patient has an unstageable pressure injury to the sacral area, present on admission to hospital.  The wound is 0.8 x 0.6 cm with base obscured by yellow slough and thin tan eschar.  The patient is cachectic in appearance and his weight is 70.3 kg per chart.  He appears to weigh less than that.  The patient has no muscle mass over bony prominences, cannot extend his knees to full extension, and the skin on his legs is leathery in appearance.  The patient is legally blind, though declines assistance with meal set up.  Recommend bordered sacral foam dressing to sacral area and low air loss mattress.  The patient is on boost dietary supplement.    Sacral unstageable pressure injury:

## 2020-01-22 NOTE — H&P
"[facility] Hospital Medicine  History & Physical    Patient Name: Chris Asencio  MRN: [id number]  Admission Date: 1/21/2020  Attending Physician: Juan Kumar MD   Primary Care Provider: Arleen Garcia MD         Patient information was obtained from patient, past medical records and ER records.     Subjective:     Principal Problem:Pneumonia of left lower lobe due to infectious organism    Chief Complaint:   Chief Complaint   Patient presents with    Generalized Body Aches     Per [facility] transfered from home w/ reported from family of + increased generalzied body aches, weakness, fatigue, dry cough and fever x two days. Pt's family member states they " are all sick and don't want to give him pneumonia".         HPI: Mr. Chris Asencio is a 88 y.o. male, with PMH of AFib, hypertension, NIDDM-2, lymphedema, legal blindness, who presented to [facility] ED on 01/21/2020 via EMS from his home secondary to multiple viral illness type symptoms, primarily a nonproductive cough with fever x2 days.  EMS report included increased generalized body aches, weakness, fatigue.  He denies abdominal pain, chills.  His daughter endorsed that she treated with 3 doses of amoxicillin called in for him by his PCP.  But he has not had improvement.  He has not yet had a chest x-ray.  A family member in the home was noted to have said we are all sick and do not want to give him pneumonia.  In the ED he was evaluated with a chest x-ray showing left lingular and lower lobe pneumonia, he was started on Rocephin and azithromycin.  Labs did indicate that he is positive for influenza a, and he received 1st dose of Tamiflu in the ED.  He was admitted to inpatient status.    Past Medical History:   Diagnosis Date    *Atrial fibrillation     Diabetes mellitus type II     Glaucoma     Hyperlipidemia     Hypertension     Kyphosis        Past Surgical History:   Procedure Laterality Date    CHOLECYSTECTOMY         Review " of patient's allergies indicates:  No Known Allergies    No current facility-administered medications on file prior to encounter.      Current Outpatient Medications on File Prior to Encounter   Medication Sig    warfarin (COUMADIN) 5 MG tablet take 1 tablet by mouth once daily    amoxicillin (AMOXIL) 500 MG Tab Take 1 tablet (500 mg total) by mouth every 12 (twelve) hours. for 10 days    blood sugar diagnostic Strp 1 strip by Misc.(Non-Drug; Combo Route) route once daily.    blood-glucose meter kit Use as instructed    econazole nitrate 1 % cream AAA bid to feet    HYDROcodone-acetaminophen (NORCO) 7.5-325 mg per tablet Take 1 tablet by mouth every 6 (six) hours as needed for Pain.    HYDROcodone-acetaminophen (NORCO) 7.5-325 mg per tablet Take 1 tablet by mouth every 6 (six) hours as needed for Pain.    HYDROcodone-acetaminophen (NORCO) 7.5-325 mg per tablet Take 1 tablet by mouth every 6 (six) hours as needed for Pain.    lancets Misc 1 Units by Misc.(Non-Drug; Combo Route) route once daily.    metoprolol tartrate (LOPRESSOR) 25 MG tablet Take 1 tablet (25 mg total) by mouth 2 (two) times daily.    nystatin-triamcinolone (MYCOLOG II) cream Apply topically 4 (four) times daily.    warfarin (COUMADIN) 1 MG tablet TAKE 1 TABLET BY MOUTH EVERY DAY     Family History     None        Tobacco Use    Smoking status: Former Smoker     Start date: 4/16/1984    Smokeless tobacco: Former User   Substance and Sexual Activity    Alcohol use: No    Drug use: No    Sexual activity: Not Currently     Partners: Female     Review of Systems   Constitutional: Negative for activity change, appetite change, chills, diaphoresis and fever.   Eyes:        Patient is legally blind.    Respiratory: Positive for cough and shortness of breath. Negative for chest tightness and wheezing.    Cardiovascular: Negative for chest pain and palpitations.   Gastrointestinal: Negative for abdominal pain, constipation, diarrhea, nausea  and vomiting.   Musculoskeletal: Negative for back pain, joint swelling, myalgias, neck pain and neck stiffness.   Skin: Negative for rash and wound.   Neurological: Negative for dizziness, weakness, light-headedness and headaches.   Hematological: Does not bruise/bleed easily.   Psychiatric/Behavioral: Negative for confusion and decreased concentration.     Objective:     Vital Signs (Most Recent):  Temp: 97.6 °F (36.4 °C) (01/22/20 0402)  Pulse: 87 (01/22/20 0408)  Resp: 16 (01/22/20 0402)  BP: (!) 113/56 (01/22/20 0402)  SpO2: 95 % (01/22/20 0402) Vital Signs (24h Range):  Temp:  [97.3 °F (36.3 °C)-98.2 °F (36.8 °C)] 97.6 °F (36.4 °C)  Pulse:  [] 87  Resp:  [10-29] 16  SpO2:  [80 %-96 %] 95 %  BP: ()/(52-60) 113/56     Weight: 70.3 kg (155 lb)  Body mass index is 22.89 kg/m².    Physical Exam   Constitutional: He is oriented to person, place, and time. Vital signs are normal. He appears well-developed and well-nourished.  Non-toxic appearance. He does not have a sickly appearance. He does not appear ill. No distress.   Thin, frail, elderly male. Very dry skin.    HENT:   Head: Normocephalic and atraumatic.   Right Ear: External ear normal.   Left Ear: External ear normal.   Tacky oral mucous membranes.   Eyes: Pupils are equal, round, and reactive to light. Conjunctivae and EOM are normal. No scleral icterus.   Neck: Normal range of motion. Neck supple. No JVD present. No tracheal deviation present.   Cardiovascular: Normal rate, regular rhythm, normal heart sounds and intact distal pulses. Exam reveals no gallop and no friction rub.   No murmur heard.  Pulmonary/Chest: Effort normal. No stridor. No respiratory distress. He has wheezes. He has rales (diffuse in left lower and mid lung fields.).   Abdominal: Soft. Bowel sounds are normal. He exhibits no distension and no mass. There is no tenderness. There is no guarding.   Neurological: He is alert and oriented to person, place, and time. No cranial  nerve deficit. He exhibits normal muscle tone. Coordination normal.   Skin: Skin is warm and dry. He is not diaphoretic.   Psychiatric: He has a normal mood and affect. His behavior is normal. Judgment and thought content normal.   Nursing note and vitals reviewed.        CRANIAL NERVES     CN III, IV, VI   Pupils are equal, round, and reactive to light.  Extraocular motions are normal.        Significant Labs:   BMP:   Recent Labs   Lab 01/21/20  1737         K 4.6      CO2 31*   BUN 25*   CREATININE 0.8   CALCIUM 10.0     CBC:   Recent Labs   Lab 01/21/20  1737   WBC 6.54   HGB 12.3*   HCT 36.2*   *     CMP:   Recent Labs   Lab 01/21/20  1737      K 4.6      CO2 31*      BUN 25*   CREATININE 0.8   CALCIUM 10.0   PROT 7.7   ALBUMIN 2.9*   BILITOT 2.0*   ALKPHOS 67   AST 23   ALT 10   ANIONGAP 9   EGFRNONAA >60     Urine Culture: No results for input(s): LABURIN in the last 48 hours.  Urine Studies: No results for input(s): COLORU, APPEARANCEUA, PHUR, SPECGRAV, PROTEINUA, GLUCUA, KETONESU, BILIRUBINUA, OCCULTUA, NITRITE, UROBILINOGEN, LEUKOCYTESUR, RBCUA, WBCUA, BACTERIA, SQUAMEPITHEL, HYALINECASTS in the last 48 hours.    Invalid input(s): WRIGHTSUR  All pertinent labs within the past 24 hours have been reviewed.    Significant Imaging: I have reviewed all pertinent imaging results/findings within the past 24 hours.   Imaging Results          X-Ray Chest AP Portable (Final result)  Result time 01/21/20 18:27:39    Final result by Terence Barraza MD (01/21/20 18:27:39)                 Impression:      New ground-glass airspace opacities in the left mid and lower lung zones.      Electronically signed by: Terence Barraza MD  Date:    01/21/2020  Time:    18:27             Narrative:    EXAMINATION:  XR CHEST AP PORTABLE    CLINICAL HISTORY:  cough;    TECHNIQUE:  Single frontal view of the chest was performed.    COMPARISON:  10/20/2017.    FINDINGS:  Monitoring EKG leads are  present.  There are postoperative changes in the left axillary region.  There also postoperative changes in the right upper abdominal quadrant.  Portions of the lung apices are not evaluated due to patient's flexed position.    The cardiomediastinal silhouette is upper limits of normal.  The right hemidiaphragm is unremarkable.  There is elevation of the left hemidiaphragm.  There are no pleural effusions.  There is no evidence of a pneumothorax.  There are new ground-glass airspace opacities in the left mid and lower lung zones.  There are degenerative changes in the osseous structures.                                 Assessment/Plan:     * Pneumonia of left lower lobe due to infectious organism  - Mr. Chris Asencio is admitted to inpatient status  - he has left lingular and lower lobe pneumonia  - status post Rocephin, azithromycin in the ED, continue  - initial lactic acid not elevated, monitor recheck  - procalcitonin pending  - pneumonia studies ordered  - p.r.n. medications for cough ordered  - port score: 88      Influenza A  - influenza A positive  - droplet precautions ordered  - initial doses Tamiflu administered in ED  - renally dose Tamiflu ordered      Atrial fibrillation  - anticoagulated on 6 mg q.d. of Coumadin q.d.  - INR pending  - monitor on tele  - continue metoprolol for rate control      Type II diabetes mellitus with complication  - last A1C:   Lab Results   Component Value Date    HGBA1C 4.9 07/29/2019    - A1C pending for AM   - hold oral antidiabetic meds   - Diabetic diet   - SSI with accuchekcs AC/HS       Essential hypertension  -hypotensive at present, with preserved map  - starting NS infusion  - monitor  - hold oral blood pressure medications       VTE Risk Mitigation (From admission, onward)         Ordered     warfarin tablet 6 mg  Daily      01/22/20 3163     Place NOVA hose  Until discontinued      01/21/20 2206     Place sequential compression device  Until discontinued       01/21/20 2205     IP VTE HIGH RISK PATIENT  Once      01/21/20 2205     Place sequential compression device  Until discontinued      01/21/20 2205     Reason for No Pharmacological VTE Prophylaxis  Once     Question:  Reasons:  Answer:  Already adequately anticoagulated on oral Anticoagulants    01/21/20 2205                   Magdalene Gant PA-C  Department of Hospital Medicine   [facility]

## 2020-01-22 NOTE — PLAN OF CARE
Problem: Occupational Therapy Goal  Goal: Occupational Therapy Goal  Description  Goals to be met by 1/29/20    Assess self-feeding  Min G/H (2 tasks) seated  Mod A UBD (patient's clothing)  Assess toilet hygiene at [facility]     Outcome: Ongoing, Progressing   OT evaluation completed with treatment to follow.  Recommend Home Health PT and OT at discharge.  DME: [facility] and pressure relieving mattress for hospital bed.  Shelly Benitez, Jorgito, LOTR 1/22/2020

## 2020-01-22 NOTE — ED NOTES
md aware that pt. Is unable to give a urine sample at this time. No urgent need for a catheter at this time.

## 2020-01-22 NOTE — SUBJECTIVE & OBJECTIVE
Past Medical History:   Diagnosis Date    *Atrial fibrillation     Diabetes mellitus type II     Glaucoma     Hyperlipidemia     Hypertension     Kyphosis        Past Surgical History:   Procedure Laterality Date    CHOLECYSTECTOMY         Review of patient's allergies indicates:  No Known Allergies    No current facility-administered medications on file prior to encounter.      Current Outpatient Medications on File Prior to Encounter   Medication Sig    warfarin (COUMADIN) 5 MG tablet take 1 tablet by mouth once daily    amoxicillin (AMOXIL) 500 MG Tab Take 1 tablet (500 mg total) by mouth every 12 (twelve) hours. for 10 days    blood sugar diagnostic Strp 1 strip by Misc.(Non-Drug; Combo Route) route once daily.    blood-glucose meter kit Use as instructed    econazole nitrate 1 % cream AAA bid to feet    HYDROcodone-acetaminophen (NORCO) 7.5-325 mg per tablet Take 1 tablet by mouth every 6 (six) hours as needed for Pain.    HYDROcodone-acetaminophen (NORCO) 7.5-325 mg per tablet Take 1 tablet by mouth every 6 (six) hours as needed for Pain.    HYDROcodone-acetaminophen (NORCO) 7.5-325 mg per tablet Take 1 tablet by mouth every 6 (six) hours as needed for Pain.    lancets Misc 1 Units by Misc.(Non-Drug; Combo Route) route once daily.    metoprolol tartrate (LOPRESSOR) 25 MG tablet Take 1 tablet (25 mg total) by mouth 2 (two) times daily.    nystatin-triamcinolone (MYCOLOG II) cream Apply topically 4 (four) times daily.    warfarin (COUMADIN) 1 MG tablet TAKE 1 TABLET BY MOUTH EVERY DAY     Family History     None        Tobacco Use    Smoking status: Former Smoker     Start date: 4/16/1984    Smokeless tobacco: Former User   Substance and Sexual Activity    Alcohol use: No    Drug use: No    Sexual activity: Not Currently     Partners: Female     Review of Systems   Constitutional: Negative for activity change, appetite change, chills, diaphoresis and fever.   Eyes:        Patient is  legally blind.    Respiratory: Positive for cough and shortness of breath. Negative for chest tightness and wheezing.    Cardiovascular: Negative for chest pain and palpitations.   Gastrointestinal: Negative for abdominal pain, constipation, diarrhea, nausea and vomiting.   Musculoskeletal: Negative for back pain, joint swelling, myalgias, neck pain and neck stiffness.   Skin: Negative for rash and wound.   Neurological: Negative for dizziness, weakness, light-headedness and headaches.   Hematological: Does not bruise/bleed easily.   Psychiatric/Behavioral: Negative for confusion and decreased concentration.     Objective:     Vital Signs (Most Recent):  Temp: 97.6 °F (36.4 °C) (01/22/20 0402)  Pulse: 87 (01/22/20 0408)  Resp: 16 (01/22/20 0402)  BP: (!) 113/56 (01/22/20 0402)  SpO2: 95 % (01/22/20 0402) Vital Signs (24h Range):  Temp:  [97.3 °F (36.3 °C)-98.2 °F (36.8 °C)] 97.6 °F (36.4 °C)  Pulse:  [] 87  Resp:  [10-29] 16  SpO2:  [80 %-96 %] 95 %  BP: ()/(52-60) 113/56     Weight: 70.3 kg (155 lb)  Body mass index is 22.89 kg/m².    Physical Exam   Constitutional: He is oriented to person, place, and time. Vital signs are normal. He appears well-developed and well-nourished.  Non-toxic appearance. He does not have a sickly appearance. He does not appear ill. No distress.   Thin, frail, elderly male. Very dry skin.    HENT:   Head: Normocephalic and atraumatic.   Right Ear: External ear normal.   Left Ear: External ear normal.   Tacky oral mucous membranes.   Eyes: Pupils are equal, round, and reactive to light. Conjunctivae and EOM are normal. No scleral icterus.   Neck: Normal range of motion. Neck supple. No JVD present. No tracheal deviation present.   Cardiovascular: Normal rate, regular rhythm, normal heart sounds and intact distal pulses. Exam reveals no gallop and no friction rub.   No murmur heard.  Pulmonary/Chest: Effort normal. No stridor. No respiratory distress. He has wheezes. He has  rales (diffuse in left lower and mid lung fields.).   Abdominal: Soft. Bowel sounds are normal. He exhibits no distension and no mass. There is no tenderness. There is no guarding.   Neurological: He is alert and oriented to person, place, and time. No cranial nerve deficit. He exhibits normal muscle tone. Coordination normal.   Skin: Skin is warm and dry. He is not diaphoretic.   Psychiatric: He has a normal mood and affect. His behavior is normal. Judgment and thought content normal.   Nursing note and vitals reviewed.        CRANIAL NERVES     CN III, IV, VI   Pupils are equal, round, and reactive to light.  Extraocular motions are normal.        Significant Labs:   BMP:   Recent Labs   Lab 01/21/20  1737         K 4.6      CO2 31*   BUN 25*   CREATININE 0.8   CALCIUM 10.0     CBC:   Recent Labs   Lab 01/21/20  1737   WBC 6.54   HGB 12.3*   HCT 36.2*   *     CMP:   Recent Labs   Lab 01/21/20  1737      K 4.6      CO2 31*      BUN 25*   CREATININE 0.8   CALCIUM 10.0   PROT 7.7   ALBUMIN 2.9*   BILITOT 2.0*   ALKPHOS 67   AST 23   ALT 10   ANIONGAP 9   EGFRNONAA >60     Urine Culture: No results for input(s): LABURIN in the last 48 hours.  Urine Studies: No results for input(s): COLORU, APPEARANCEUA, PHUR, SPECGRAV, PROTEINUA, GLUCUA, KETONESU, BILIRUBINUA, OCCULTUA, NITRITE, UROBILINOGEN, LEUKOCYTESUR, RBCUA, WBCUA, BACTERIA, SQUAMEPITHEL, HYALINECASTS in the last 48 hours.    Invalid input(s): WRIGHTSUR  All pertinent labs within the past 24 hours have been reviewed.    Significant Imaging: I have reviewed all pertinent imaging results/findings within the past 24 hours.   Imaging Results          X-Ray Chest AP Portable (Final result)  Result time 01/21/20 18:27:39    Final result by Terence Barraza MD (01/21/20 18:27:39)                 Impression:      New ground-glass airspace opacities in the left mid and lower lung zones.      Electronically signed by: Terence Barraza  MD  Date:    01/21/2020  Time:    18:27             Narrative:    EXAMINATION:  XR CHEST AP PORTABLE    CLINICAL HISTORY:  cough;    TECHNIQUE:  Single frontal view of the chest was performed.    COMPARISON:  10/20/2017.    FINDINGS:  Monitoring EKG leads are present.  There are postoperative changes in the left axillary region.  There also postoperative changes in the right upper abdominal quadrant.  Portions of the lung apices are not evaluated due to patient's flexed position.    The cardiomediastinal silhouette is upper limits of normal.  The right hemidiaphragm is unremarkable.  There is elevation of the left hemidiaphragm.  There are no pleural effusions.  There is no evidence of a pneumothorax.  There are new ground-glass airspace opacities in the left mid and lower lung zones.  There are degenerative changes in the osseous structures.

## 2020-01-22 NOTE — PROGRESS NOTES
Pressure injury noted during admission assessment. Stage III- full thickness, subcutaneous fat visible. No drainage

## 2020-01-22 NOTE — PLAN OF CARE
LMSW contacted the patients daughter Ronal for discharge planning assessment.   Prior to admission patient is alert and oriented with no communication barriers.     Prior to admission patient was completely dependent. Patients daughter is with him 24 hours a day. She states he can walk with assistance but complaints of pain. Patient has a WC, HB, and walker in the home. Patient is not current with HH. Patient had interim HH but it went out of business. LMSW will arrange new HH at discharge. Family is requesting a new HB as the bed the patient has is not comfortable. Family does not know what agency the bed came from.     Patients PCP is correct on the face sheet. Patient choice pharmacy is [facility] on [address].      Family denies having advance directives.      Patients family will transport the patient home at discharge.     CM team will continue to follow.          01/22/20 1037   Discharge Assessment   Assessment Type Discharge Planning Assessment   Confirmed/corrected address and phone number on facesheet? Yes   Assessment information obtained from? Caregiver   Communicated expected length of stay with patient/caregiver no   Prior to hospitilization cognitive status: Alert/Oriented   Prior to hospitalization functional status: Completely Dependent   Current cognitive status: Alert/Oriented   Current Functional Status: Completely Dependent   Lives With child(lana), adult   Able to Return to Prior Arrangements yes   Is patient able to care for self after discharge? No   Patient's perception of discharge disposition home health   Readmission Within the Last 30 Days no previous admission in last 30 days   Patient currently being followed by outpatient case management? No   Equipment Currently Used at Home other (see comments);walker, rolling;wheelchair;hospital bed   Do you have any problems affording any of your prescribed medications? No   Is the patient taking medications as prescribed? yes   Does the patient  have transportation home? Yes   Transportation Anticipated family or friend will provide   Does the patient receive services at the [facility]? No   Discharge Plan A Home Health   DME Needed Upon Discharge  hospital bed   Patient/Family in Agreement with Plan yes

## 2020-01-22 NOTE — ED NOTES
Pt unable to provide urine sample at this time. Pt given urinal, family instructed to call on call light when pt urinates, instructed on clean catch technique, pt verbalizes understanding. Will continue to monitor.

## 2020-01-23 LAB
ANION GAP SERPL CALC-SCNC: 5 MMOL/L (ref 8–16)
BASOPHILS # BLD AUTO: 0 K/UL (ref 0–0.2)
BASOPHILS NFR BLD: 0 % (ref 0–1.9)
BUN SERPL-MCNC: 17 MG/DL (ref 8–23)
CALCIUM SERPL-MCNC: 8.3 MG/DL (ref 8.7–10.5)
CHLORIDE SERPL-SCNC: 108 MMOL/L (ref 95–110)
CO2 SERPL-SCNC: 27 MMOL/L (ref 23–29)
CREAT SERPL-MCNC: 0.6 MG/DL (ref 0.5–1.4)
DIFFERENTIAL METHOD: ABNORMAL
EOSINOPHIL # BLD AUTO: 0.1 K/UL (ref 0–0.5)
EOSINOPHIL NFR BLD: 0.9 % (ref 0–8)
ERYTHROCYTE [DISTWIDTH] IN BLOOD BY AUTOMATED COUNT: 14 % (ref 11.5–14.5)
EST. GFR  (AFRICAN AMERICAN): >60 ML/MIN/1.73 M^2
EST. GFR  (NON AFRICAN AMERICAN): >60 ML/MIN/1.73 M^2
FERRITIN SERPL-MCNC: 389 NG/ML (ref 20–300)
FOLATE SERPL-MCNC: 6.4 NG/ML (ref 4–24)
GLUCOSE SERPL-MCNC: 78 MG/DL (ref 70–110)
HAPTOGLOB SERPL-MCNC: 121 MG/DL (ref 30–250)
HCT VFR BLD AUTO: 28.5 % (ref 40–54)
HGB BLD-MCNC: 9.3 G/DL (ref 14–18)
IMM GRANULOCYTES # BLD AUTO: 0.03 K/UL (ref 0–0.04)
IMM GRANULOCYTES NFR BLD AUTO: 0.5 % (ref 0–0.5)
INR PPP: 1.1 (ref 0.8–1.2)
IRON SERPL-MCNC: 23 UG/DL (ref 45–160)
LDH SERPL L TO P-CCNC: 123 U/L (ref 110–260)
LYMPHOCYTES # BLD AUTO: 1.3 K/UL (ref 1–4.8)
LYMPHOCYTES NFR BLD: 23.3 % (ref 18–48)
MCH RBC QN AUTO: 26.1 PG (ref 27–31)
MCHC RBC AUTO-ENTMCNC: 32.6 G/DL (ref 32–36)
MCV RBC AUTO: 80 FL (ref 82–98)
MONOCYTES # BLD AUTO: 0.5 K/UL (ref 0.3–1)
MONOCYTES NFR BLD: 8.1 % (ref 4–15)
NEUTROPHILS # BLD AUTO: 3.7 K/UL (ref 1.8–7.7)
NEUTROPHILS NFR BLD: 67.2 % (ref 38–73)
NRBC BLD-RTO: 0 /100 WBC
PLATELET # BLD AUTO: 149 K/UL (ref 150–350)
PMV BLD AUTO: 10.4 FL (ref 9.2–12.9)
POCT GLUCOSE: 101 MG/DL (ref 70–110)
POCT GLUCOSE: 107 MG/DL (ref 70–110)
POCT GLUCOSE: 77 MG/DL (ref 70–110)
POCT GLUCOSE: 95 MG/DL (ref 70–110)
POTASSIUM SERPL-SCNC: 3.4 MMOL/L (ref 3.5–5.1)
PROTHROMBIN TIME: 11.7 SEC (ref 9–12.5)
RBC # BLD AUTO: 3.56 M/UL (ref 4.6–6.2)
SATURATED IRON: 16 % (ref 20–50)
SODIUM SERPL-SCNC: 140 MMOL/L (ref 136–145)
TOTAL IRON BINDING CAPACITY: 145 UG/DL (ref 250–450)
TRANSFERRIN SERPL-MCNC: 98 MG/DL (ref 200–375)
VIT B12 SERPL-MCNC: 581 PG/ML (ref 210–950)
WBC # BLD AUTO: 5.57 K/UL (ref 3.9–12.7)

## 2020-01-23 PROCEDURE — 94664 DEMO&/EVAL PT USE INHALER: CPT

## 2020-01-23 PROCEDURE — 82272 OCCULT BLD FECES 1-3 TESTS: CPT

## 2020-01-23 PROCEDURE — 83615 LACTATE (LD) (LDH) ENZYME: CPT

## 2020-01-23 PROCEDURE — 82746 ASSAY OF FOLIC ACID SERUM: CPT

## 2020-01-23 PROCEDURE — 83540 ASSAY OF IRON: CPT

## 2020-01-23 PROCEDURE — 63600175 PHARM REV CODE 636 W HCPCS: Performed by: INTERNAL MEDICINE

## 2020-01-23 PROCEDURE — 97530 THERAPEUTIC ACTIVITIES: CPT

## 2020-01-23 PROCEDURE — 25000003 PHARM REV CODE 250: Performed by: PHYSICIAN ASSISTANT

## 2020-01-23 PROCEDURE — 99233 PR SUBSEQUENT HOSPITAL CARE,LEVL III: ICD-10-PCS | Mod: ,,, | Performed by: INTERNAL MEDICINE

## 2020-01-23 PROCEDURE — 83010 ASSAY OF HAPTOGLOBIN QUANT: CPT

## 2020-01-23 PROCEDURE — 85025 COMPLETE CBC W/AUTO DIFF WBC: CPT

## 2020-01-23 PROCEDURE — 36415 COLL VENOUS BLD VENIPUNCTURE: CPT

## 2020-01-23 PROCEDURE — 82728 ASSAY OF FERRITIN: CPT

## 2020-01-23 PROCEDURE — 99900035 HC TECH TIME PER 15 MIN (STAT)

## 2020-01-23 PROCEDURE — 85610 PROTHROMBIN TIME: CPT

## 2020-01-23 PROCEDURE — 97535 SELF CARE MNGMENT TRAINING: CPT

## 2020-01-23 PROCEDURE — 80048 BASIC METABOLIC PNL TOTAL CA: CPT

## 2020-01-23 PROCEDURE — 82306 VITAMIN D 25 HYDROXY: CPT

## 2020-01-23 PROCEDURE — 63600175 PHARM REV CODE 636 W HCPCS: Performed by: PHYSICIAN ASSISTANT

## 2020-01-23 PROCEDURE — 94761 N-INVAS EAR/PLS OXIMETRY MLT: CPT

## 2020-01-23 PROCEDURE — 82607 VITAMIN B-12: CPT

## 2020-01-23 PROCEDURE — 11000001 HC ACUTE MED/SURG PRIVATE ROOM

## 2020-01-23 PROCEDURE — 99233 SBSQ HOSP IP/OBS HIGH 50: CPT | Mod: ,,, | Performed by: INTERNAL MEDICINE

## 2020-01-23 PROCEDURE — 97112 NEUROMUSCULAR REEDUCATION: CPT

## 2020-01-23 RX ORDER — HEPARIN SODIUM 5000 [USP'U]/ML
5000 INJECTION, SOLUTION INTRAVENOUS; SUBCUTANEOUS EVERY 8 HOURS
Status: DISCONTINUED | OUTPATIENT
Start: 2020-01-23 | End: 2020-01-25 | Stop reason: HOSPADM

## 2020-01-23 RX ADMIN — HEPARIN SODIUM 5000 UNITS: 5000 INJECTION, SOLUTION INTRAVENOUS; SUBCUTANEOUS at 11:01

## 2020-01-23 RX ADMIN — AZITHROMYCIN MONOHYDRATE 500 MG: 500 INJECTION, POWDER, LYOPHILIZED, FOR SOLUTION INTRAVENOUS at 10:01

## 2020-01-23 RX ADMIN — METOPROLOL TARTRATE 25 MG: 25 TABLET ORAL at 09:01

## 2020-01-23 RX ADMIN — HEPARIN SODIUM 5000 UNITS: 5000 INJECTION, SOLUTION INTRAVENOUS; SUBCUTANEOUS at 10:01

## 2020-01-23 RX ADMIN — WARFARIN SODIUM 6 MG: 5 TABLET ORAL at 05:01

## 2020-01-23 RX ADMIN — SODIUM CHLORIDE: 0.9 INJECTION, SOLUTION INTRAVENOUS at 04:01

## 2020-01-23 RX ADMIN — OSELTAMIVIR PHOSPHATE 75 MG: 6 POWDER, FOR SUSPENSION ORAL at 09:01

## 2020-01-23 RX ADMIN — SODIUM CHLORIDE: 0.9 INJECTION, SOLUTION INTRAVENOUS at 02:01

## 2020-01-23 RX ADMIN — SODIUM CHLORIDE: 0.9 INJECTION, SOLUTION INTRAVENOUS at 10:01

## 2020-01-23 RX ADMIN — CEFTRIAXONE 1 G: 1 INJECTION, SOLUTION INTRAVENOUS at 09:01

## 2020-01-23 NOTE — PT/OT/SLP EVAL
Physical Therapy Evaluation and treatment    Patient Name:  Chris Asencio   MRN:  [id number]    Recommendations:     Discharge Recommendations:  home health PT, home health OT   Discharge Equipment Recommendations: bedside commode( hospital bed with low air loss mattress, wc cushion)   Barriers to discharge: None    Assessment:     Chris Asencio is a 88 y.o. male admitted with a medical diagnosis of Pneumonia of left lower lobe due to infectious organism.  He presents with the following impairments/functional limitations:  weakness, impaired endurance, decreased upper extremity function, decreased ROM, decreased lower extremity function, impaired balance, gait instability, impaired cognition, decreased safety awareness, visual deficits, impaired sensation, impaired self care skills, impaired functional mobilty, impaired muscle length .  Pt largely wc bound and contractures associated with sitting and being in bed bed .  Benefit from therapy to prevent complete debilitation of pt while in hospital -to get pt to EOB/OOB for respiratory benefits as well     Rehab Prognosis: Fair; patient would benefit from acute skilled PT services to address these deficits and reach maximum level of function.    Recent Surgery: * No surgery found *      Plan:     During this hospitalization, patient to be seen 4 x/week to address the identified rehab impairments via therapeutic activities, therapeutic exercises, neuromuscular re-education, wheelchair management/training and progress toward the following goals:    · Plan of Care Expires:  02/21/20    Subjective     Chief Complaint: none stated  Patient/Family Comments/goals: you know I cant see.  You got to tell me what we are doing .  It feels good to sit here(at EOB)  States his grandsons help him get into the chair at home   Pain/Comfort:  · Pain Rating 1: 0/10  · Pain Rating Post-Intervention 1: 0/10    Patients cultural, spiritual, Anabaptist conflicts given the current situation:  other (see comments)(none stated)    Living Environment:  Pt lives with his daughter and grandsons in a single story house with 1 ROSALEE at entry  Prior to admission, patients level of function was largely wc bound although pt states he spends most of time in bed.  dtr not present .  Son who is present hasnt seen him in a while. Per OT-He is  Total A bathing and LBD, SBA UBD, feeding, and toilet hygiene (pt needs more assist than he allows family to perform)- .  Equipment used at home: wheelchair, hospital bed.  DME owned (not currently used): rolling walker and single point cane.  Upon discharge, patient will have assistance from family-daughter and grandsons. .    Objective:     Communicated with nurse prior to session.  Patient found left sidelying with Condom Catheter, peripheral IV, telemetry  upon PT entry to room.    General Precautions: Standard, blind, fall   Orthopedic Precautions:N/A   Braces: N/A     Exams:  · Cognitive Exam:  Patient is oriented to Person  · Gross Motor Coordination:  impaired in BLE  · Postural Exam:  Patient presented with the following abnormalities:    · -       Rounded shoulders  · -       Forward head  · -       Posterior pelvic tilt  · -       Lateral weight shift of hips  · -       Kyphosis  · -       Scoliosis  · Sensation: decreased in B feet as compare to legs   · Skin Integrity/Edema:      · -       Skin integrity: extreme dryness to legs with almost leather appearance, sacral wound bandaged  · RLE ROM: Deficits: knee flex contracture , ankles to neutral   · RLE Strength: Deficits: grossly 3-/s in available range   · LLE ROM: Deficits: same as R but slightly worse knee flex contracture   · LLE Strength: Deficits: same as RLE  ·     Functional Mobility:  · Bed Mobility:     · Rolling Left:  minimum assistance  · Rolling Right: minimum assistance  · Supine to Sit: minimum assistance  · Sit to Supine: minimum assistance  · Transfers:     · Sit to Stand:  moderate assistance with  hand-held assist-squat stand only-unable to get into full upright  · Gait-able to take 3 side steps with mod HHA in squat position  · Balance: poor standing , fair sitting      Therapeutic Activities and Exercises:   PT yara.  Worked in sitting on posture and LE ROM ex. Side steps as above  Back in bed gentle stretching and positioning in bed.     AM-PAC 6 CLICK MOBILITY  Total Score:10     Patient left HOB elevated with all lines intact, call button in reach, nurse notified and son present.    GOALS:   Multidisciplinary Problems     Physical Therapy Goals        Problem: Physical Therapy Goal    Goal Priority Disciplines Outcome Goal Variances Interventions   Physical Therapy Goal     PT, PT/OT Ongoing, Progressing     Description:    Goals to be met by: 2/10/2020       Patient will increase functional independence with mobility by performin. Supine to sit with Contact Guard Assistance  2. Sit to supine with MInimal Assistance  3. Bed to chair transfer with Minimal Assistance   4.  Pt OOB to chair > 1 hr                      History:     Past Medical History:   Diagnosis Date    *Atrial fibrillation     Diabetes mellitus type II     Glaucoma     Hyperlipidemia     Hypertension     Kyphosis        Past Surgical History:   Procedure Laterality Date    CHOLECYSTECTOMY         Time Tracking:     PT Received On: 20  PT Start Time:      PT Stop Time: 1819  PT Total Time (min): 44 min     Billable Minutes: Evaluation 15, Therapeutic Activity 17 and Therapeutic Exercise 12      [place] Hernandez, PT  2020

## 2020-01-23 NOTE — PLAN OF CARE
Problem: Physical Therapy Goal  Goal: Physical Therapy Goal  Description    Goals to be met by: 2/10/2020       Patient will increase functional independence with mobility by performin. Supine to sit with Contact Guard Assistance  2. Sit to supine with MInimal Assistance  3. Bed to chair transfer with Minimal Assistance   4.  Pt OOB to chair > 1 hr     Outcome: Ongoing, Progressing   Pt presented supine, agreeable to OOB(initially).  Bed mobility roll R with min A and instruction for use of rails, up to sit EOB  with min A.   Sitting balance training x 10 min with CGA>min A for static sitting.  Attempted to sit>stand unsuccessful, pt could only achieve squat position.  Attempts to transfer to BSC were declined by pt, asking to return to supine.  Pt educated re: importance of OOB, pt continued to request BTB.  Pt returned to supine in bed with min A.

## 2020-01-23 NOTE — SUBJECTIVE & OBJECTIVE
Interval History: Patient overall stable overnight.  Feeling slightly better from admission.      Review of Systems   Constitutional: Negative for activity change, appetite change, chills, diaphoresis and fever.   Eyes:        Patient is legally blind.    Respiratory: Positive for cough. Negative for chest tightness, shortness of breath and wheezing.    Cardiovascular: Negative for chest pain and palpitations.   Gastrointestinal: Negative for abdominal pain, constipation, diarrhea, nausea and vomiting.   Musculoskeletal: Negative for back pain, joint swelling, myalgias, neck pain and neck stiffness.   Skin: Negative for rash and wound.   Neurological: Negative for dizziness, weakness, light-headedness and headaches.   Hematological: Does not bruise/bleed easily.   Psychiatric/Behavioral: Negative for confusion and decreased concentration.     Objective:     Vital Signs (Most Recent):  Temp: 97.7 °F (36.5 °C) (01/22/20 2322)  Pulse: 82 (01/23/20 0600)  Resp: 18 (01/22/20 2322)  BP: 125/82 (01/22/20 2322)  SpO2: (!) 92 % (01/22/20 2322) Vital Signs (24h Range):  Temp:  [97.6 °F (36.4 °C)-98.5 °F (36.9 °C)] 97.7 °F (36.5 °C)  Pulse:  [] 82  Resp:  [18-20] 18  SpO2:  [92 %-98 %] 92 %  BP: ()/(50-82) 125/82     Weight: 70.3 kg (155 lb)  Body mass index is 22.89 kg/m².    Intake/Output Summary (Last 24 hours) at 1/23/2020 0625  Last data filed at 1/22/2020 1900  Gross per 24 hour   Intake 2915 ml   Output 300 ml   Net 2615 ml      Physical Exam   Constitutional: He is oriented to person, place, and time. Vital signs are normal. He appears well-developed and well-nourished.  Non-toxic appearance. He does not have a sickly appearance. He does not appear ill. No distress.   Thin, frail, elderly male. Very dry skin.    HENT:   Head: Normocephalic and atraumatic.   Right Ear: External ear normal.   Left Ear: External ear normal.   Tacky oral mucous membranes.   Eyes: Pupils are equal, round, and reactive to light.  Conjunctivae and EOM are normal. No scleral icterus.   Neck: Normal range of motion. Neck supple. No JVD present. No tracheal deviation present.   Cardiovascular: Normal rate, regular rhythm, normal heart sounds and intact distal pulses. Exam reveals no gallop and no friction rub.   No murmur heard.  Pulmonary/Chest: Effort normal. No stridor. No respiratory distress. He has no wheezes. He has rales (diffuse in left lower and mid lung fields.).   Abdominal: Soft. Bowel sounds are normal. He exhibits no distension and no mass. There is no tenderness. There is no guarding.   Neurological: He is alert and oriented to person, place, and time. No cranial nerve deficit. He exhibits normal muscle tone. Coordination normal.   Skin: Skin is warm and dry. He is not diaphoretic.   Psychiatric: He has a normal mood and affect. His behavior is normal. Judgment and thought content normal.   Nursing note and vitals reviewed.      Significant Labs: All pertinent labs within the past 24 hours have been reviewed.    Significant Imaging: I have reviewed all pertinent imaging results/findings within the past 24 hours.

## 2020-01-23 NOTE — PT/OT/SLP PROGRESS
Occupational Therapy   Treatment    Name: Chris Asencio  MRN: [id number]  Admitting Diagnosis:  Pneumonia of left lower lobe due to infectious organism       Recommendations:     Discharge Recommendations: home health OT, home health PT  Discharge Equipment Recommendations:  bedside commode  Barriers to discharge:  None    Assessment:     Chris Asencio is a 88 y.o. male with a medical diagnosis of Pneumonia of left lower lobe due to infectious organism.  He presents with no concerns.. Performance deficits affecting function are impaired endurance, decreased safety awareness, impaired cognition, impaired balance, gait instability, visual deficits, decreased ROM, decreased lower extremity function, decreased upper extremity function, impaired fine motor.  He completed self-feeding with Max A, needing help for memory of food location, and retrieving food from the tray.  He was able to wipe is mouth and hands with set-up; assist needed for complete hand washing (washed tips of his finger only. Continuation of OT treatment is needed to maximize patient function while in the hospital.    Rehab Prognosis:  Fair; patient would benefit from acute skilled OT services to address these deficits and reach maximum level of function.       Plan:     Patient to be seen 5 x/week to address the above listed problems via self-care/home management, therapeutic activities, therapeutic exercises, cognitive retraining, sensory integration  · Plan of Care Expires: 02/22/20  · Plan of Care Reviewed with: patient    Subjective     Pain/Comfort:  · Pain Rating 1: 0/10  · Pain Rating Post-Intervention 1: 0/10    Objective:     Communicated with: patient and his nurse prior to session.  Patient found left sidelying with Condom Catheter, peripheral IV, telemetry upon OT entry to room.  Pt declined sitting EOB, so the activities were done bed level.     General Precautions: Standard, blind, fall   Orthopedic Precautions:N/A   Braces: N/A      Occupational Performance:     Bed Mobility:    · Min A roll to Left  · Min A roll to Right     Functional Mobility/Transfers:        None    Activities of Daily Living:  · Max A self-feeding (assist for memory of food location,scooping food, retrieving and retrieving food from tray once assist was given for object location.  · Min A G/H (SBA wipe mouth, blow nose, Mod A wash hands-washes tips of fingers only    AMPAC 6 Click ADL: 11    Treatment & Education:  Memory training, use of non-dominant hand to assist memory of food location on tray    Patient left left sidelying with all lines intact, call button in reach, bed alarm on and nurse notifiedEducation:   that pt got congested during eating with no obvious signs of aspiration.    GOALS:   Multidisciplinary Problems     Occupational Therapy Goals        Problem: Occupational Therapy Goal    Goal Priority Disciplines Outcome Interventions   Occupational Therapy Goal     OT, PT/OT Ongoing, Progressing    Description:  Goals to be met by 1/29/20    Mod A self-feeing supported sitting  Min G/H (2 tasks) seated  Mod A UBD (patient's clothing)  Assess toilet hygiene at [facility]     COMPLETED GOALS  Assess self-feeding MET 1/23/20                      Time Tracking:     OT Date of Treatment: 01/23/20  OT Start Time: 1549  OT Stop Time: 1638  OT Total Time (min): 49 min    Billable Minutes:Self Care/Home Management 49    Shelly Benitez, [place], LOTR  1/23/2020

## 2020-01-23 NOTE — PLAN OF CARE
POC reviewed with patient and family members at bedside.  Patient blind.  A&O x 2.  Not oriented to place/time.  Patient very frail, contracted and skin dry/flaky.  Appetite poor, fluid intake poor.  Patient did eat banana and 2 boosts for breakfast.  Voiding through condom cath.  Did not have BM this shift.  Bps running on the lower side during shift, tachycardic when moving with PT/OT.  IV fluids running throughout shift.  Wound care saw patient for small stage 3 sacral wound - wound care orders written.  Bed in low and locked position, safety measures in place.  Will continue to monitor.

## 2020-01-23 NOTE — PT/OT/SLP PROGRESS
Physical Therapy Treatment    Patient Name:  Chris Asencio   MRN:  [id number]    Recommendations:     Discharge Recommendations:  home health PT   Discharge Equipment Recommendations: bedside commode   Barriers to discharge: None    Assessment:     Chris Asencio is a 88 y.o. male admitted with a medical diagnosis of Pneumonia of left lower lobe due to infectious organism.  He presents with the following impairments/functional limitations:  weakness, impaired endurance, impaired self care skills, impaired functional mobilty, gait instability, impaired balance, visual deficits, impaired cognition, decreased coordination, decreased upper extremity function, decreased lower extremity function, decreased safety awareness, decreased ROM, impaired coordination, impaired muscle length, impaired joint extensibility .Pt continues with functional mobility deficits and should benefit from continuing current PT POC to maximize I and safety decrease risk of further decline of injury.    Rehab Prognosis: Fair; patient would benefit from acute skilled PT services to address these deficits and reach maximum level of function.    Recent Surgery: * No surgery found *      Plan:     During this hospitalization, patient to be seen 4 x/week to address the identified rehab impairments via gait training, therapeutic activities, therapeutic exercises, neuromuscular re-education and progress toward the following goals:    · Plan of Care Expires:  02/21/20    Subjective     Chief Complaint: pt without specific c/o  Patient/Family Comments/goals: pt asking for breakfast-confirmed with nurse that it had been ordered  Pain/Comfort:  · Pain Rating 1: 0/10      Objective:     Communicated with nursing prior to session.  Patient found supine with Condom Catheter, peripheral IV, telemetry upon PT entry to room.     General Precautions: Standard, fall, blind   Orthopedic Precautions:N/A   Braces: N/A     Functional Mobility:  · Bed Mobility:      · Supine to Sit: minimum assistance  · Sit to Supine: minimum assistance  · Transfers:     · Sit to Stand: sit>stand with mod A unsuccessful, pt could only achieve squat position      AM-PAC 6 CLICK MOBILITY  Turning over in bed (including adjusting bedclothes, sheets and blankets)?: 3  Sitting down on and standing up from a chair with arms (e.g., wheelchair, bedside commode, etc.): 3  Moving from lying on back to sitting on the side of the bed?: 3  Moving to and from a bed to a chair (including a wheelchair)?: 2  Need to walk in hospital room?: 1  Climbing 3-5 steps with a railing?: 1  Basic Mobility Total Score: 13       Therapeutic Activities and Exercises:  Pt presented supine, agreeable to OOB(initially).  Bed mobility roll R with min A and instruction for use of rails, up to sit EOB  with min A.   Sitting balance training x 10 min with CGA>min A for static sitting.  Attempted to sit>stand with mod A unsuccessful, pt could only achieve squat position.  Attempts to transfer to [facility] were declined by pt, asking to return to supine.  Pt educated re: importance of OOB, pt continued to request BTB.  Pt returned to supine in bed with min A.    Patient left left sidelying with all lines intact, call button in reach and nurse notified..    GOALS:   Multidisciplinary Problems     Physical Therapy Goals        Problem: Physical Therapy Goal    Goal Priority Disciplines Outcome Goal Variances Interventions   Physical Therapy Goal     PT, PT/OT Ongoing, Progressing     Description:    Goals to be met by: 2/10/2020       Patient will increase functional independence with mobility by performin. Supine to sit with Contact Guard Assistance  2. Sit to supine with MInimal Assistance  3. Bed to chair transfer with Minimal Assistance   4.  Pt OOB to chair > 1 hr                      Time Tracking:     PT Received On: 20  PT Start Time: 831     PT Stop Time: 855  PT Total Time (min): 24 min     Billable Minutes:  Therapeutic Activity 14 and Neuromuscular Re-education 10    Treatment Type: Treatment  PT/PTA: PT     PTA Visit Number: 0     Vivek Schuster, PT  01/23/2020

## 2020-01-23 NOTE — PLAN OF CARE
Problem: Occupational Therapy Goal  Goal: Occupational Therapy Goal  Description  Goals to be met by 1/29/20    Mod A self-feeing supported sitting  Min G/H (2 tasks) seated  Mod A UBD (patient's clothing)  Assess toilet hygiene at [facility]     COMPLETED GOALS  Assess self-feeding MET 1/23/20     Outcome: Ongoing, Progressing   The patient completed self-feeding with Max LORRIE, needing help for memory of food location, and retrieving food from the tray.  He was able to wipe is mouth and hands with set-up; assist needed for complete hand washing (washed tips of his finger only.    Shelly Benitez, ScD, LOTR 1/23/2020

## 2020-01-23 NOTE — PROGRESS NOTES
[facility] Hospital Medicine  Progress Note    Patient Name: Chris Asencio  MRN: [id number]  Patient Class: IP- Inpatient   Admission Date: 1/21/2020  Length of Stay: 2 days  Attending Physician: Juan Kumar MD  Primary Care Provider: Arleen Garcia MD        Subjective:     Principal Problem:Pneumonia of left lower lobe due to infectious organism        HPI:  Mr. Chris Asencio is a 88 y.o. male, with PMH of AFib, hypertension, NIDDM-2, lymphedema, legal blindness, who presented to [facility] ED on 01/21/2020 via EMS from his home secondary to multiple viral illness type symptoms, primarily a nonproductive cough with fever x2 days.  EMS report included increased generalized body aches, weakness, fatigue.  He denies abdominal pain, chills.  His daughter endorsed that she treated with 3 doses of amoxicillin called in for him by his PCP.  But he has not had improvement.  He has not yet had a chest x-ray.  A family member in the home was noted to have said we are all sick and do not want to give him pneumonia.  In the ED he was evaluated with a chest x-ray showing left lingular and lower lobe pneumonia, he was started on Rocephin and azithromycin.  Labs did indicate that he is positive for influenza a, and he received 1st dose of Tamiflu in the ED.  He was admitted to inpatient status.    Overview/Hospital Course:  Patient overall feeling better since admission.  No reported adverse events overnight.    Interval History: Patient overall stable overnight.  Feeling slightly better from admission.      Review of Systems   Constitutional: Negative for activity change, appetite change, chills, diaphoresis and fever.   Eyes:        Patient is legally blind.    Respiratory: Positive for cough. Negative for chest tightness, shortness of breath and wheezing.    Cardiovascular: Negative for chest pain and palpitations.   Gastrointestinal: Negative for abdominal pain, constipation, diarrhea, nausea and  vomiting.   Musculoskeletal: Negative for back pain, joint swelling, myalgias, neck pain and neck stiffness.   Skin: Negative for rash and wound.   Neurological: Negative for dizziness, weakness, light-headedness and headaches.   Hematological: Does not bruise/bleed easily.   Psychiatric/Behavioral: Negative for confusion and decreased concentration.     Objective:     Vital Signs (Most Recent):  Temp: 97.7 °F (36.5 °C) (01/22/20 2322)  Pulse: 82 (01/23/20 0600)  Resp: 18 (01/22/20 2322)  BP: 125/82 (01/22/20 2322)  SpO2: (!) 92 % (01/22/20 2322) Vital Signs (24h Range):  Temp:  [97.6 °F (36.4 °C)-98.5 °F (36.9 °C)] 97.7 °F (36.5 °C)  Pulse:  [] 82  Resp:  [18-20] 18  SpO2:  [92 %-98 %] 92 %  BP: ()/(50-82) 125/82     Weight: 70.3 kg (155 lb)  Body mass index is 22.89 kg/m².    Intake/Output Summary (Last 24 hours) at 1/23/2020 0625  Last data filed at 1/22/2020 1900  Gross per 24 hour   Intake 2915 ml   Output 300 ml   Net 2615 ml      Physical Exam   Constitutional: He is oriented to person, place, and time. Vital signs are normal. He appears well-developed and well-nourished.  Non-toxic appearance. He does not have a sickly appearance. He does not appear ill. No distress.   Thin, frail, elderly male. Very dry skin.    HENT:   Head: Normocephalic and atraumatic.   Right Ear: External ear normal.   Left Ear: External ear normal.   Tacky oral mucous membranes.   Eyes: Pupils are equal, round, and reactive to light. Conjunctivae and EOM are normal. No scleral icterus.   Neck: Normal range of motion. Neck supple. No JVD present. No tracheal deviation present.   Cardiovascular: Normal rate, regular rhythm, normal heart sounds and intact distal pulses. Exam reveals no gallop and no friction rub.   No murmur heard.  Pulmonary/Chest: Effort normal. No stridor. No respiratory distress. He has no wheezes. He has rales (diffuse in left lower and mid lung fields.).   Abdominal: Soft. Bowel sounds are normal. He  exhibits no distension and no mass. There is no tenderness. There is no guarding.   Neurological: He is alert and oriented to person, place, and time. No cranial nerve deficit. He exhibits normal muscle tone. Coordination normal.   Skin: Skin is warm and dry. He is not diaphoretic.   Psychiatric: He has a normal mood and affect. His behavior is normal. Judgment and thought content normal.   Nursing note and vitals reviewed.      Significant Labs: All pertinent labs within the past 24 hours have been reviewed.    Significant Imaging: I have reviewed all pertinent imaging results/findings within the past 24 hours.      Assessment/Plan:   Patient is a 88 y.o. male, with PMH of AFib, hypertension, NIDDM-2, lymphedema, legal blindness, who presented to [facility] ED on 01/21/2020 via EMS from his home secondary to multiple viral illness type symptoms, primarily a nonproductive cough with fever x2 days.  EMS report included increased generalized body aches, weakness, fatigue.  He denies abdominal pain, chills.  His daughter endorsed that she treated with 3 doses of amoxicillin called in for him by his PCP.  But he has not had improvement.  He has not yet had a chest x-ray.  A family member in the home was noted to have said we are all sick and do not want to give him pneumonia.  In the ED he was evaluated with a chest x-ray showing left lingular and lower lobe pneumonia, he was started on Rocephin and azithromycin.  Labs did indicate that he is positive for influenza a, and he received 1st dose of Tamiflu in the ED.  He was admitted to inpatient status.     -ID/Pulmonary - LLL PNA and Influenza A.  On Ceftriaxone/Azithromycin and Tamiflu.  Legionella Ag pending.  Respiratory culture pending.  Lactate normal on admission. Procalcitonin slightly elevated at 0.36. WBC normal on admission.  RVP pending.  CURB 65 was 2 points (moderate risk).  Feeling a little better this morning with less coughing.  No SOB at rest.     CXR on  "1/21/2020 -New ground-glass airspace opacities in the left mid and lower lung zones.      -Cards - HTN.  Afib.  HFrEF.  On Metoprolol.  On Warfarin.  BNP normal on admission.  INR 1.0 on admission and 1.1 today.  Not on an ARB.  Needs follow up TTE as outpatient.     TTE in 5/2014 - Mild left atrial enlargement.     2 - Mildly to moderately depressed left ventricular systolic function (EF 40-45%).     3 - Normal left ventricular diastolic function.     4 - Normal right ventricular systolic function .     5 - Mild mitral regurgitation.     6 - Intermediate central venous pressure.      -Endocrine - T2D.  SSI.  A1C 4.7 on admission.  Stable Glucose levels.    -Heme - Microcytic Anemia - Hgb 12.3 on admission with MCV of 80.  Was 12.3 in 7/2019.  Hgb dropped to 9.3 on 1/23/2020 after hydration.  Will check Fe studies, B12/Folate, Haptoglobin.  LDH normal.  Check FOBT.       -MSK - Pressure Ulcers - Wound Consult placed - "The patient has an unstageable pressure injury to the sacral area, present on admission to hospital.  The wound is 0.8 x 0.6 cm with base obscured by yellow slough and thin tan eschar.  The patient is cachectic in appearance and his weight is 70.3 kg per chart.  He appears to weigh less than that.  The patient has no muscle mass over bony prominences, cannot extend his knees to full extension, and the skin on his legs is leathery in appearance. The patient is legally blind, though declines assistance with meal set up. Recommend bordered sacral foam dressing to sacral area and low air loss mattress.  The patient is on boost dietary supplement".    -DVT prophylaxis - on Warfarin, but INR not therapeutic.  Will add SQ Heparin until INR bumps.     -Dispo - PT/OT consulted for evaluation -  on discharge.       VTE Risk Mitigation (From admission, onward)         Ordered     heparin (porcine) injection 5,000 Units  Every 8 hours      01/23/20 0978     warfarin tablet 6 mg  Daily      01/22/20 0532     " Place NOVA hose  Until discontinued      01/21/20 2205     Place sequential compression device  Until discontinued      01/21/20 2205     IP VTE HIGH RISK PATIENT  Once      01/21/20 2205     Place sequential compression device  Until discontinued      01/21/20 2205     Reason for No Pharmacological VTE Prophylaxis  Once     Question:  Reasons:  Answer:  Already adequately anticoagulated on oral Anticoagulants    01/21/20 2205                      Juan Kumar MD  Department of Hospital Medicine   [facility]

## 2020-01-23 NOTE — PLAN OF CARE
PT eval.  Assist to EOB and squat side christiana toward HOB.  Pt requires assist with all functional mobility-near baseline.  PT 4 x week   REC: home with 24/7 assistance   DME:  hospital bed with low air loss mattress, wc cushion, BSC

## 2020-01-24 LAB
25(OH)D3+25(OH)D2 SERPL-MCNC: <4 NG/ML (ref 30–96)
ANION GAP SERPL CALC-SCNC: 6 MMOL/L (ref 8–16)
BASOPHILS # BLD AUTO: 0 K/UL (ref 0–0.2)
BASOPHILS NFR BLD: 0 % (ref 0–1.9)
BUN SERPL-MCNC: 14 MG/DL (ref 8–23)
CALCIUM SERPL-MCNC: 8.1 MG/DL (ref 8.7–10.5)
CHLORIDE SERPL-SCNC: 110 MMOL/L (ref 95–110)
CO2 SERPL-SCNC: 24 MMOL/L (ref 23–29)
CREAT SERPL-MCNC: 0.6 MG/DL (ref 0.5–1.4)
DIFFERENTIAL METHOD: ABNORMAL
EOSINOPHIL # BLD AUTO: 0 K/UL (ref 0–0.5)
EOSINOPHIL NFR BLD: 0.6 % (ref 0–8)
ERYTHROCYTE [DISTWIDTH] IN BLOOD BY AUTOMATED COUNT: 14.5 % (ref 11.5–14.5)
EST. GFR  (AFRICAN AMERICAN): >60 ML/MIN/1.73 M^2
EST. GFR  (NON AFRICAN AMERICAN): >60 ML/MIN/1.73 M^2
GLUCOSE SERPL-MCNC: 65 MG/DL (ref 70–110)
HCT VFR BLD AUTO: 29.8 % (ref 40–54)
HGB BLD-MCNC: 9.9 G/DL (ref 14–18)
IMM GRANULOCYTES # BLD AUTO: 0.03 K/UL (ref 0–0.04)
IMM GRANULOCYTES NFR BLD AUTO: 0.5 % (ref 0–0.5)
INR PPP: 1 (ref 0.8–1.2)
L PNEUMO AG UR QL IA: NOT DETECTED
LYMPHOCYTES # BLD AUTO: 1.5 K/UL (ref 1–4.8)
LYMPHOCYTES NFR BLD: 22.8 % (ref 18–48)
MCH RBC QN AUTO: 26.9 PG (ref 27–31)
MCHC RBC AUTO-ENTMCNC: 33.2 G/DL (ref 32–36)
MCV RBC AUTO: 81 FL (ref 82–98)
MONOCYTES # BLD AUTO: 0.5 K/UL (ref 0.3–1)
MONOCYTES NFR BLD: 7.9 % (ref 4–15)
NEUTROPHILS # BLD AUTO: 4.4 K/UL (ref 1.8–7.7)
NEUTROPHILS NFR BLD: 68.2 % (ref 38–73)
NRBC BLD-RTO: 0 /100 WBC
OB PNL STL: NEGATIVE
PLATELET # BLD AUTO: 154 K/UL (ref 150–350)
PMV BLD AUTO: 10.3 FL (ref 9.2–12.9)
POCT GLUCOSE: 79 MG/DL (ref 70–110)
POCT GLUCOSE: 98 MG/DL (ref 70–110)
POTASSIUM SERPL-SCNC: 3.7 MMOL/L (ref 3.5–5.1)
PROTHROMBIN TIME: 11.4 SEC (ref 9–12.5)
RBC # BLD AUTO: 3.68 M/UL (ref 4.6–6.2)
SODIUM SERPL-SCNC: 140 MMOL/L (ref 136–145)
WBC # BLD AUTO: 6.49 K/UL (ref 3.9–12.7)

## 2020-01-24 PROCEDURE — 63600175 PHARM REV CODE 636 W HCPCS: Performed by: INTERNAL MEDICINE

## 2020-01-24 PROCEDURE — 94664 DEMO&/EVAL PT USE INHALER: CPT

## 2020-01-24 PROCEDURE — 99233 SBSQ HOSP IP/OBS HIGH 50: CPT | Mod: ,,, | Performed by: INTERNAL MEDICINE

## 2020-01-24 PROCEDURE — 25000003 PHARM REV CODE 250: Performed by: INTERNAL MEDICINE

## 2020-01-24 PROCEDURE — 85025 COMPLETE CBC W/AUTO DIFF WBC: CPT

## 2020-01-24 PROCEDURE — 85610 PROTHROMBIN TIME: CPT

## 2020-01-24 PROCEDURE — 80048 BASIC METABOLIC PNL TOTAL CA: CPT

## 2020-01-24 PROCEDURE — 36415 COLL VENOUS BLD VENIPUNCTURE: CPT

## 2020-01-24 PROCEDURE — 11000001 HC ACUTE MED/SURG PRIVATE ROOM

## 2020-01-24 PROCEDURE — 99900035 HC TECH TIME PER 15 MIN (STAT)

## 2020-01-24 PROCEDURE — 97530 THERAPEUTIC ACTIVITIES: CPT | Mod: CQ

## 2020-01-24 PROCEDURE — 63600175 PHARM REV CODE 636 W HCPCS: Performed by: PHYSICIAN ASSISTANT

## 2020-01-24 PROCEDURE — 97535 SELF CARE MNGMENT TRAINING: CPT

## 2020-01-24 PROCEDURE — 25000003 PHARM REV CODE 250: Performed by: PHYSICIAN ASSISTANT

## 2020-01-24 PROCEDURE — 94761 N-INVAS EAR/PLS OXIMETRY MLT: CPT

## 2020-01-24 PROCEDURE — 99233 PR SUBSEQUENT HOSPITAL CARE,LEVL III: ICD-10-PCS | Mod: ,,, | Performed by: INTERNAL MEDICINE

## 2020-01-24 RX ORDER — OSELTAMIVIR PHOSPHATE 75 MG/1
75 CAPSULE ORAL 2 TIMES DAILY
Qty: 4 CAPSULE | Refills: 0 | Status: SHIPPED | OUTPATIENT
Start: 2020-01-24 | End: 2020-01-26

## 2020-01-24 RX ORDER — AZITHROMYCIN 250 MG/1
250 TABLET, FILM COATED ORAL DAILY
Qty: 2 TABLET | Refills: 0 | Status: SHIPPED | OUTPATIENT
Start: 2020-01-24 | End: 2020-01-26

## 2020-01-24 RX ORDER — FOLIC ACID 1 MG/1
TABLET ORAL
Qty: 90 TABLET | OUTPATIENT
Start: 2020-01-24

## 2020-01-24 RX ORDER — FOLIC ACID 1 MG/1
1 TABLET ORAL DAILY
Qty: 30 TABLET | Refills: 0 | Status: SHIPPED | OUTPATIENT
Start: 2020-01-25 | End: 2020-02-24

## 2020-01-24 RX ORDER — FOLIC ACID 1 MG/1
1 TABLET ORAL DAILY
Status: DISCONTINUED | OUTPATIENT
Start: 2020-01-24 | End: 2020-01-25 | Stop reason: HOSPADM

## 2020-01-24 RX ORDER — AMOXICILLIN AND CLAVULANATE POTASSIUM 875; 125 MG/1; MG/1
1 TABLET, FILM COATED ORAL 2 TIMES DAILY
Qty: 8 TABLET | Refills: 0 | Status: SHIPPED | OUTPATIENT
Start: 2020-01-24 | End: 2020-01-28

## 2020-01-24 RX ORDER — METOPROLOL TARTRATE 25 MG/1
12.5 TABLET ORAL 2 TIMES DAILY
Status: DISCONTINUED | OUTPATIENT
Start: 2020-01-24 | End: 2020-01-25 | Stop reason: HOSPADM

## 2020-01-24 RX ORDER — BENZONATATE 100 MG/1
100 CAPSULE ORAL 3 TIMES DAILY PRN
Qty: 30 CAPSULE | Refills: 0 | Status: SHIPPED | OUTPATIENT
Start: 2020-01-24 | End: 2020-02-03

## 2020-01-24 RX ORDER — LANOLIN ALCOHOL/MO/W.PET/CERES
1000 CREAM (GRAM) TOPICAL DAILY
Status: DISCONTINUED | OUTPATIENT
Start: 2020-01-24 | End: 2020-01-25 | Stop reason: HOSPADM

## 2020-01-24 RX ORDER — LANOLIN ALCOHOL/MO/W.PET/CERES
1000 CREAM (GRAM) TOPICAL DAILY
Qty: 30 TABLET | Refills: 0 | Status: SHIPPED | OUTPATIENT
Start: 2020-01-25

## 2020-01-24 RX ADMIN — HEPARIN SODIUM 5000 UNITS: 5000 INJECTION, SOLUTION INTRAVENOUS; SUBCUTANEOUS at 02:01

## 2020-01-24 RX ADMIN — CYANOCOBALAMIN TAB 1000 MCG 1000 MCG: 1000 TAB at 08:01

## 2020-01-24 RX ADMIN — AZITHROMYCIN MONOHYDRATE 500 MG: 500 INJECTION, POWDER, LYOPHILIZED, FOR SOLUTION INTRAVENOUS at 11:01

## 2020-01-24 RX ADMIN — OSELTAMIVIR PHOSPHATE 75 MG: 6 POWDER, FOR SUSPENSION ORAL at 08:01

## 2020-01-24 RX ADMIN — HEPARIN SODIUM 5000 UNITS: 5000 INJECTION, SOLUTION INTRAVENOUS; SUBCUTANEOUS at 09:01

## 2020-01-24 RX ADMIN — METOPROLOL TARTRATE 25 MG: 25 TABLET ORAL at 08:01

## 2020-01-24 RX ADMIN — WARFARIN SODIUM 6 MG: 5 TABLET ORAL at 05:01

## 2020-01-24 RX ADMIN — HEPARIN SODIUM 5000 UNITS: 5000 INJECTION, SOLUTION INTRAVENOUS; SUBCUTANEOUS at 05:01

## 2020-01-24 RX ADMIN — SODIUM CHLORIDE: 0.9 INJECTION, SOLUTION INTRAVENOUS at 04:01

## 2020-01-24 RX ADMIN — SODIUM CHLORIDE: 0.9 INJECTION, SOLUTION INTRAVENOUS at 08:01

## 2020-01-24 RX ADMIN — OSELTAMIVIR PHOSPHATE 75 MG: 6 POWDER, FOR SUSPENSION ORAL at 09:01

## 2020-01-24 RX ADMIN — FOLIC ACID 1 MG: 1 TABLET ORAL at 08:01

## 2020-01-24 RX ADMIN — METOPROLOL TARTRATE 12.5 MG: 25 TABLET, FILM COATED ORAL at 09:01

## 2020-01-24 RX ADMIN — CEFTRIAXONE 1 G: 1 INJECTION, SOLUTION INTRAVENOUS at 09:01

## 2020-01-24 NOTE — PLAN OF CARE
Pt remained free of falls and injuries throughout shift. AAO x3. Purposeful hourly rounding performed. IV flushed and infusing. No complaints of pain. Turning patient side to side every two hours. Dressing to sacral spine dry, clean and intact. VSS on room air. Bed low and locked, call light within reach, side rails up X2. Will continue to monitor.

## 2020-01-24 NOTE — PLAN OF CARE
Spoke with pt and son, Chris, at bedside.  Son states he is ambulatory with walker at baseline and able to transfer by wheelchair to home today.      Family requests  van home, confirmed home address.  Spoke with daughter, Ronal [phone].  She will be home to accept pt.  ADT30 placed, expect pickup at [facility] [place] by 2pm.  Bedside RN informed.     Pt and daughter request home health.  Orders sent in [facility] to [facility], awaiting assigned HH company.  Spoke with Doreen at [facility].  She will contact CM with name of assigned company.    Pt choice form signed and information added to AVS.  All questions answered, verbalizes understanding.      Pt also requests new Hospital bed.  Order placed.  Spoke with SONYA Sainz at [facility].  Pt received hospital bed in June 2019, not eligible for new one at this time.  Informed daughter if something is wrong with bed they can contact [facility] for replacement parts.  Information added to AVS.      No other DC needs from  perspective.     1255 : Pt hypotensive.  Discharge orders cancelled.  MD will re-evaluate for discharge tomorrow.  Patient flow center notified.  CM will need to place new ADT30 order for ride home whenever pt is discharged.     01/24/20 1245   Final Note   Assessment Type Final Discharge Note   Anticipated Discharge Disposition Home-Health   What phone number can be called within the next 1-3 days to see how you are doing after discharge? [phone]   Hospital Follow Up  Appt(s) scheduled? Yes   Discharge plans and expectations educations in teach back method with documentation complete? Yes   Right Care Referral Info   Post Acute Recommendation Home-care

## 2020-01-24 NOTE — DISCHARGE SUMMARY
[facility] Hospital Medicine  Discharge Summary      Patient Name: Chris Asencio  MRN: [id number]  Admission Date: 1/21/2020  Hospital Length of Stay: 3 days  Discharge Date and Time:  01/24/2020 10:12 AM  Attending Physician: Juan Kumar MD   Discharging Provider: Juan Kumar MD  Primary Care Provider: Arleen Garcia MD      HPI:   Mr. Chris Asencio is a 88 y.o. male, with PMH of AFib, hypertension, NIDDM-2, lymphedema, legal blindness, who presented to [facility] on 01/21/2020 via EMS from his home secondary to multiple viral illness type symptoms, primarily a nonproductive cough with fever x2 days.  EMS report included increased generalized body aches, weakness, fatigue.  He denies abdominal pain, chills.  His daughter endorsed that she treated with 3 doses of amoxicillin called in for him by his PCP.  But he has not had improvement.  He has not yet had a chest x-ray.  A family member in the home was noted to have said we are all sick and do not want to give him pneumonia.  In the ED he was evaluated with a chest x-ray showing left lingular and lower lobe pneumonia, he was started on Rocephin and azithromycin.  Labs did indicate that he is positive for influenza a, and he received 1st dose of Tamiflu in the ED.  He was admitted to inpatient status.    Hospital Course:   Patient overall feeling better since admission.  No reported adverse events overnight.  See Below.    Patient is a 88 y.o. male, with PMH of AFib, hypertension, NIDDM-2, lymphedema, legal blindness, who presented to [facility] ED on 01/21/2020 via EMS from his home secondary to multiple viral illness type symptoms, primarily a nonproductive cough with fever x2 days.  EMS report included increased generalized body aches, weakness, fatigue.  He denies abdominal pain, chills.  His daughter endorsed that she treated with 3 doses of amoxicillin called in for him by his PCP.  But he has not had improvement.  He has not yet had  a chest x-ray.  A family member in the home was noted to have said we are all sick and do not want to give him pneumonia.  In the ED he was evaluated with a chest x-ray showing left lingular and lower lobe pneumonia, he was started on Rocephin and azithromycin.  Labs did indicate that he is positive for influenza a, and he received 1st dose of Tamiflu in the ED.  He was admitted to inpatient status.     -ID/Pulmonary - LLL PNA and Influenza A.  On Ceftriaxone/Azithromycin and Tamiflu.  Legionella Ag pending.  Respiratory culture pending.  Lactate normal on admission. Procalcitonin slightly elevated at 0.36. WBC normal on admission.  RVP pending.  CURB 65 was 2 points (moderate risk).  Feeling a little better 1/24/2020 and 1/25/2020 with less coughing.  No SOB at rest.  Pox 96 on RA.     CXR on 1/21/2020 -New ground-glass airspace opacities in the left mid and lower lung zones.      -Cards - HTN.  Afib.  HFrEF.  On Metoprolol.  On Warfarin.  BNP normal on admission.  INR 1.0 on admission and 1.1 today.  Not on an ARB. Needs follow up TTE as outpatient.  Needs follow up for INR.     TTE in 5/2014 - Mild left atrial enlargement.     2 - Mildly to moderately depressed left ventricular systolic function (EF 40-45%).     3 - Normal left ventricular diastolic function.     4 - Normal right ventricular systolic function .     5 - Mild mitral regurgitation.     6 - Intermediate central venous pressure.      Had hypotension yesterday, so discharge held.  On chart review, his last outpatient visit had a SBP of 80, so this is around his recent baseline.  Decreased Metoprolol to 12.5mg bid for now. Patient is asymptomatic and did well with PT/OT yesterday.  BP stable today.     -Endocrine - T2D.  SSI.  A1C 4.7 on admission.  Stable Glucose levels.     -Heme - Microcytic Anemia - Hgb 12.3 on admission with MCV of 80.  Was 12.3 in 7/2019.  Hgb dropped to 9.3 on 1/23/2020 after hydration.  Will check Fe studies (Ferritin of 389  "and Fe sat of 16%, B12/Folate (581/6.4 - vitamin B12 and Folic acid started), Haptoglobin normal.  LDH normal.  Check FOBT - ordered, but not yet done.       Hgb stable at 9.9 on 1/24/2020.     -Renal - Potassium a little low at 3.2 today.  Will give po KCl today.  Calcium low, but corrects with albumin.     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  The wound is 0.8 x 0.6 cm with base obscured by yellow slough and thin tan eschar.  The patient is cachectic in appearance and his weight is 70.3 kg per chart.  He appears to weigh less than that.  The patient has no muscle mass over bony prominences, cannot extend his knees to full extension, and the skin on his legs is leathery in appearance. The patient is legally blind, though declines assistance with meal set up. Recommend bordered sacral foam dressing to sacral area and low air loss mattress. The patient is on boost dietary supplement".     -DVT prophylaxis - on Warfarin, but INR not therapeutic.  On SQ Heparin until INR bumps.     -Dispo - PT/OT consulted for evaluation - HH on discharge today     Consults:   Consults (From admission, onward)        Status Ordering Provider     IP consult to case management  Once     Provider:  (Not yet assigned)    Completed MERNA JAIMES        Final Active Diagnoses:    Diagnosis Date Noted POA    PRINCIPAL PROBLEM:  Pneumonia of left lower lobe due to infectious organism [J18.1] 01/21/2020 Yes    Pressure injury of sacral region, unstageable [L89.150] 01/22/2020 Yes    Essential hypertension [I10] 01/21/2020 Yes    Influenza A [J10.1] 01/21/2020 Yes    Type II diabetes mellitus with complication [E11.8] 08/06/2014 Yes    Atrial fibrillation [I48.91] 04/09/2013 Yes      Problems Resolved During this Admission:       Discharged Condition: fair    Disposition:     Follow Up:    Patient Instructions:      HOSPITAL " "BED FOR HOME USE     Order Specific Question Answer Comments   Type: Semi-electric    Length of need (1-99 months): 99    Does patient have medical equipment at home? wheelchair    Does patient have medical equipment at home? hospital bed    Height: 5' 9" (1.753 m)    Weight: 70.3 kg (155 lb)    Accessories: Gel overlay mattress    Please check all that apply: Patient requires positioning of the body in ways not feasible in an ordinary bed due to a medical condition which is expected to last at least one month.    Please check all that apply: Patient requires, for the alleviation of pain, positioning of the body in ways not feasible in an ordinary bed.    Please check all that apply: Patient requires a bed height different than a fixed height hospital bed to permit transfers to chair, wheelchair, or standing.    Please check all that apply: Patient requires frequent changes in body position and/or has an immediate need for a change in body position.        Significant Diagnostic Studies: Labs:   BMP:   Recent Labs   Lab 01/23/20 0444 01/24/20 0411   GLU 78 65*    140   K 3.4* 3.7    110   CO2 27 24   BUN 17 14   CREATININE 0.6 0.6   CALCIUM 8.3* 8.1*    and CBC   Recent Labs   Lab 01/23/20 0444 01/24/20  0411   WBC 5.57 6.49   HGB 9.3* 9.9*   HCT 28.5* 29.8*   * 154       Pending Diagnostic Studies:     Procedure Component Value Units Date/Time    Legionella antigen, urine [[id number]] Collected:  01/22/20 0515    Order Status:  Sent Lab Status:  In process Updated:  01/22/20 0910    Specimen:  Urine, Catheterized          Medications:  Reconciled Home Medications:      Medication List      START taking these medications    amoxicillin-clavulanate 875-125mg 875-125 mg per tablet  Commonly known as:  AUGMENTIN  Take 1 tablet by mouth 2 (two) times daily. for 4 days     azithromycin 250 MG tablet  Commonly known as:  Z-LEXII  Take 1 tablet (250 mg total) by mouth once daily. for 2 days   "   benzonatate 100 MG capsule  Commonly known as:  TESSALON  Take 1 capsule (100 mg total) by mouth 3 (three) times daily as needed for Cough.     cyanocobalamin 1000 MCG tablet  Commonly known as:  VITAMIN B-12  Take 1 tablet (1,000 mcg total) by mouth once daily.  Start taking on:  January 25, 2020     folic acid 1 MG tablet  Commonly known as:  FOLVITE  Take 1 tablet (1 mg total) by mouth once daily.  Start taking on:  January 25, 2020     oseltamivir 75 MG capsule  Commonly known as:  TAMIFLU  Take 1 capsule (75 mg total) by mouth 2 (two) times daily. for 2 days        CONTINUE taking these medications    blood sugar diagnostic Strp  1 strip by Misc.(Non-Drug; Combo Route) route once daily.     blood-glucose meter kit  Use as instructed     econazole nitrate 1 % cream  AAA bid to feet     lancets Misc  1 Units by Misc.(Non-Drug; Combo Route) route once daily.     metoprolol tartrate 25 MG tablet  Commonly known as:  LOPRESSOR  Take 1 tablet (25 mg total) by mouth 2 (two) times daily.     * warfarin 5 MG tablet  Commonly known as:  COUMADIN  take 1 tablet by mouth once daily     * warfarin 1 MG tablet  Commonly known as:  COUMADIN  TAKE 1 TABLET BY MOUTH EVERY DAY         * This list has 2 medication(s) that are the same as other medications prescribed for you. Read the directions carefully, and ask your doctor or other care provider to review them with you.            STOP taking these medications    amoxicillin 500 MG Tab  Commonly known as:  AMOXIL     HYDROcodone-acetaminophen 7.5-325 mg per tablet  Commonly known as:  NORCO     nystatin-triamcinolone cream  Commonly known as:  MYCOLOG II            Indwelling Lines/Drains at time of discharge:   Lines/Drains/Airways     Drain            Male External Urinary Catheter 01/22/20 2239 1 day          Pressure Ulcer                 Pressure Injury 01/21/20 2300  Sacral spine Unstageable 2 days                Time spent on the discharge of patient: 35  minutes  Patient was seen and examined on the date of discharge and determined to be suitable for discharge.         Juan Kumar MD  Department of Hospital Medicine  [facility]

## 2020-01-24 NOTE — PT/OT/SLP PROGRESS
Physical Therapy Treatment    Patient Name:  Chris Asencio   MRN:  [id number]    Recommendations:     Discharge Recommendations:  home health PT, home health OT (and 24/7 assistance)  Discharge Equipment Recommendations: bedside commode   Barriers to discharge: None    Assessment:     Chrsi Asencio is a 88 y.o. male admitted with a medical diagnosis of Pneumonia of left lower lobe due to infectious organism.  He presents with the following impairments/functional limitations:  weakness, impaired endurance, impaired self care skills, impaired functional mobilty, impaired balance, visual deficits, decreased upper extremity function, decreased lower extremity function, decreased ROM, impaired joint extensibility, decreased safety awareness, impaired skin ;pt with improved mobility today, able to sit up EOB ~25 min. With SBA, pt stood up 3x's using bedrails with BUE's and Girish.    Rehab Prognosis: Fair; patient would benefit from acute skilled PT services to address these deficits and reach maximum level of function.    Recent Surgery: * No surgery found *      Plan:     During this hospitalization, patient to be seen 4 x/week to address the identified rehab impairments via gait training, therapeutic activities, therapeutic exercises, neuromuscular re-education and progress toward the following goals:    · Plan of Care Expires:  02/21/20    Subjective     Chief Complaint: none stated  Patient/Family Comments/goals: pt agreeable to session, pleasant and cooperative.  Pain/Comfort:  · Pain Rating 1: (pt did not report pain, although moaning a littel when LE's moved)  · Pain Rating Post-Intervention 1: 0/10(at rest)      Objective:     Communicated with nurse prior to session.  Patient found supine with Condom Catheter, peripheral IV, telemetry upon PT entry to room. Just finished with OT.    General Precautions: Standard, blind, fall, droplet(flu)   Orthopedic Precautions:N/A   Braces: N/A     Functional Mobility:  · Bed  Mobility:     · Rolling Left:  minimum assistance and moderate assistance  · Rolling Right: minimum assistance and moderate assistance  · Scooting: contact guard assistance, minimum assistance and along EOB  · Bridging: minimum assistance  · Supine to Sit: minimum assistance and moderate assistance  · Sit to Supine: minimum assistance  · Transfers:     · Sit to Stand:  minimum assistance with pt using bedrials      AM-PAC 6 CLICK MOBILITY  Turning over in bed (including adjusting bedclothes, sheets and blankets)?: 3  Sitting down on and standing up from a chair with arms (e.g., wheelchair, bedside commode, etc.): 3  Moving from lying on back to sitting on the side of the bed?: 3  Moving to and from a bed to a chair (including a wheelchair)?: 2  Need to walk in hospital room?: 1  Climbing 3-5 steps with a railing?: 1  Basic Mobility Total Score: 13       Therapeutic Activities and Exercises:   pt stood 3x's for ~:10 sec. Ea. , using BUE's on bedrails and min.A. Pt unable to stand up fully 2/2 BLE contractures.    Patient left right sidelying with all lines intact, call button in reach and nurse notified..    GOALS:   Multidisciplinary Problems     Physical Therapy Goals        Problem: Physical Therapy Goal    Goal Priority Disciplines Outcome Goal Variances Interventions   Physical Therapy Goal     PT, PT/OT Ongoing, Progressing     Description:    Goals to be met by: 2/10/2020       Patient will increase functional independence with mobility by performin. Supine to sit with Contact Guard Assistance  2. Sit to supine with MInimal Assistance  3. Bed to chair transfer with Minimal Assistance   4.  Pt OOB to chair > 1 hr                      Time Tracking:     PT Received On: 20  PT Start Time: 1040     PT Stop Time: 1113  PT Total Time (min): 33 min     Billable Minutes: Therapeutic Activity 33    Treatment Type: Treatment  PT/PTA: PTA     PTA Visit Number: 1     Eliza Sam PTA  2020

## 2020-01-24 NOTE — PROGRESS NOTES
[facility] Hospital Medicine  Progress Note    Patient Name: Chris Asencio  MRN: [id number]  Patient Class: IP- Inpatient   Admission Date: 1/21/2020  Length of Stay: 3 days  Attending Physician: Juan Kumar MD  Primary Care Provider: Arleen Garcia MD        Subjective:     Principal Problem:Pneumonia of left lower lobe due to infectious organism        HPI:  Mr. Chris Asencio is a 88 y.o. male, with PMH of AFib, hypertension, NIDDM-2, lymphedema, legal blindness, who presented to [facility] ED on 01/21/2020 via EMS from his home secondary to multiple viral illness type symptoms, primarily a nonproductive cough with fever x2 days.  EMS report included increased generalized body aches, weakness, fatigue.  He denies abdominal pain, chills.  His daughter endorsed that she treated with 3 doses of amoxicillin called in for him by his PCP.  But he has not had improvement.  He has not yet had a chest x-ray.  A family member in the home was noted to have said we are all sick and do not want to give him pneumonia.  In the ED he was evaluated with a chest x-ray showing left lingular and lower lobe pneumonia, he was started on Rocephin and azithromycin.  Labs did indicate that he is positive for influenza a, and he received 1st dose of Tamiflu in the ED.  He was admitted to inpatient status.    Overview/Hospital Course:  Patient overall feeling better since admission.  No reported adverse events overnight.    Interval History: Patient overall stable overnight.  Feeling slightly better from admission.      Review of Systems   Constitutional: Negative for activity change, appetite change, chills, diaphoresis and fever.   Eyes:        Patient is legally blind.    Respiratory: Positive for cough. Negative for chest tightness, shortness of breath and wheezing.    Cardiovascular: Negative for chest pain and palpitations.   Gastrointestinal: Negative for abdominal pain, constipation, diarrhea, nausea and  vomiting.   Musculoskeletal: Negative for back pain, joint swelling, myalgias, neck pain and neck stiffness.   Skin: Negative for rash and wound.   Neurological: Negative for dizziness, weakness, light-headedness and headaches.   Hematological: Does not bruise/bleed easily.   Psychiatric/Behavioral: Negative for confusion and decreased concentration.     Objective:     Vital Signs (Most Recent):  Temp: 97.5 °F (36.4 °C) (01/24/20 0448)  Pulse: 87 (01/24/20 0448)  Resp: 16 (01/24/20 0448)  BP: (!) 92/52 (01/24/20 0448)  SpO2: 96 % (01/24/20 0448) Vital Signs (24h Range):  Temp:  [97.5 °F (36.4 °C)-98.5 °F (36.9 °C)] 97.5 °F (36.4 °C)  Pulse:  [56-88] 87  Resp:  [16-24] 16  SpO2:  [93 %-96 %] 96 %  BP: ()/(52-89) 92/52     Weight: 70.3 kg (155 lb)  Body mass index is 22.89 kg/m².    Intake/Output Summary (Last 24 hours) at 1/24/2020 0540  Last data filed at 1/24/2020 0000  Gross per 24 hour   Intake 660 ml   Output 1125 ml   Net -465 ml      Physical Exam   Constitutional: He is oriented to person, place, and time. Vital signs are normal. He appears well-developed and well-nourished.  Non-toxic appearance. He does not have a sickly appearance. He does not appear ill. No distress.   Thin, frail, elderly male. Very dry skin.    HENT:   Head: Normocephalic and atraumatic.   Right Ear: External ear normal.   Left Ear: External ear normal.   Tacky oral mucous membranes.   Eyes: Pupils are equal, round, and reactive to light. Conjunctivae and EOM are normal. No scleral icterus.   Neck: Normal range of motion. Neck supple. No JVD present. No tracheal deviation present.   Cardiovascular: Normal rate, regular rhythm, normal heart sounds and intact distal pulses. Exam reveals no gallop and no friction rub.   No murmur heard.  Pulmonary/Chest: Effort normal. No stridor. No respiratory distress. He has no wheezes. He has rales (diffuse in left lower and mid lung fields.).   Abdominal: Soft. Bowel sounds are normal. He  exhibits no distension and no mass. There is no tenderness. There is no guarding.   Neurological: He is alert and oriented to person, place, and time. No cranial nerve deficit. He exhibits normal muscle tone. Coordination normal.   Skin: Skin is warm and dry. He is not diaphoretic.   Psychiatric: He has a normal mood and affect. His behavior is normal. Judgment and thought content normal.   Nursing note and vitals reviewed.      Significant Labs: All pertinent labs within the past 24 hours have been reviewed.    Significant Imaging: I have reviewed all pertinent imaging results/findings within the past 24 hours.      Assessment/Plan:   Patient is a 88 y.o. male, with PMH of AFib, hypertension, NIDDM-2, lymphedema, legal blindness, who presented to [facility] ED on 01/21/2020 via EMS from his home secondary to multiple viral illness type symptoms, primarily a nonproductive cough with fever x2 days.  EMS report included increased generalized body aches, weakness, fatigue.  He denies abdominal pain, chills.  His daughter endorsed that she treated with 3 doses of amoxicillin called in for him by his PCP.  But he has not had improvement.  He has not yet had a chest x-ray.  A family member in the home was noted to have said we are all sick and do not want to give him pneumonia.  In the ED he was evaluated with a chest x-ray showing left lingular and lower lobe pneumonia, he was started on Rocephin and azithromycin.  Labs did indicate that he is positive for influenza a, and he received 1st dose of Tamiflu in the ED.  He was admitted to inpatient status.     -ID/Pulmonary - LLL PNA and Influenza A.  On Ceftriaxone/Azithromycin and Tamiflu.  Legionella Ag pending.  Respiratory culture pending.  Lactate normal on admission. Procalcitonin slightly elevated at 0.36. WBC normal on admission.  RVP pending.  CURB 65 was 2 points (moderate risk).  Feeling a little better 1/24/2020 with less coughing.  No SOB at rest.  Pox 96 on  "RA.     CXR on 1/21/2020 -New ground-glass airspace opacities in the left mid and lower lung zones.      -Cards - HTN.  Afib.  HFrEF.  On Metoprolol.  On Warfarin.  BNP normal on admission.  INR 1.0 on admission and 1.1 today.  Not on an ARB.  Needs follow up TTE as outpatient.     TTE in 5/2014 - Mild left atrial enlargement.     2 - Mildly to moderately depressed left ventricular systolic function (EF 40-45%).     3 - Normal left ventricular diastolic function.     4 - Normal right ventricular systolic function .     5 - Mild mitral regurgitation.     6 - Intermediate central venous pressure.      -Endocrine - T2D.  SSI.  A1C 4.7 on admission.  Stable Glucose levels.  Given low-normal B12 and Folate below, will check vitamin D level.     -Heme - Microcytic Anemia - Hgb 12.3 on admission with MCV of 80.  Was 12.3 in 7/2019.  Hgb dropped to 9.3 on 1/23/2020 after hydration.  Will check Fe studies (Ferritin of 389 and Fe sat of 16%, B12/Folate (581/6.4 - vitamin B12 and Folic acid started), Haptoglobin normal.  LDH normal.  Check FOBT - ordered, but not yet done.      Hgb stable at 9.9 on 1/24/2020.     -MSK - Pressure Ulcers - Wound Consult placed - "The patient has an unstageable pressure injury to the sacral area, present on admission to hospital.  The wound is 0.8 x 0.6 cm with base obscured by yellow slough and thin tan eschar.  The patient is cachectic in appearance and his weight is 70.3 kg per chart.  He appears to weigh less than that.  The patient has no muscle mass over bony prominences, cannot extend his knees to full extension, and the skin on his legs is leathery in appearance. The patient is legally blind, though declines assistance with meal set up. Recommend bordered sacral foam dressing to sacral area and low air loss mattress.  The patient is on boost dietary supplement".     -DVT prophylaxis - on Warfarin, but INR not therapeutic.  Will add SQ Heparin until INR bumps.     -Dispo - PT/OT consulted " for evaluation -  on discharge.    VTE Risk Mitigation (From admission, onward)         Ordered     heparin (porcine) injection 5,000 Units  Every 8 hours      01/23/20 0953     warfarin tablet 6 mg  Daily      01/22/20 0529     Place NOVA hose  Until discontinued      01/21/20 2205     Place sequential compression device  Until discontinued      01/21/20 2205     IP VTE HIGH RISK PATIENT  Once      01/21/20 2205     Place sequential compression device  Until discontinued      01/21/20 2205     Reason for No Pharmacological VTE Prophylaxis  Once     Question:  Reasons:  Answer:  Already adequately anticoagulated on oral Anticoagulants    01/21/20 2205                      Juan Kumar MD  Department of Hospital Medicine   [facility]

## 2020-01-24 NOTE — PLAN OF CARE
[facility]    HOME HEALTH ORDERS  FACE TO FACE ENCOUNTER    Patient Name: Chris Asencio  YOB: 1931    PCP: Arleen Garcia MD   PCP Address: [address] / [place] [address]  PCP Phone Number: [phone]  PCP Fax: [phone]    Encounter Date: 01/25/2020    Admit to Home Health    Diagnoses:  Active Hospital Problems    Diagnosis  POA    *Pneumonia of left lower lobe due to infectious organism [J18.1]  Yes    Pressure injury of sacral region, unstageable [L89.150]  Yes    Essential hypertension [I10]  Yes    Influenza A [J10.1]  Yes    Type II diabetes mellitus with complication [E11.8]  Yes    Atrial fibrillation [I48.91]  Yes      Resolved Hospital Problems   No resolved problems to display.       Future Appointments   Date Time Provider Department Center   2/3/2020  2:20 PM Arleen Garcia MD [facility] [place]   3/18/2020  8:40 AM Arleen Garcia MD [facility] [place]           I have seen and examined this patient face to face today. My clinical findings that support the need for the home health skilled services and home bound status are the following:  Weakness/numbness causing balance and gait disturbance due to Heart Failure, Infection and Weakness/Debility making it taxing to leave home.  Requiring assistive device to leave home due to unsteady gait caused by  Infection and Weakness/Debility.    Allergies:Review of patient's allergies indicates:  No Known Allergies    Diet: diabetic diet: 2000 calorie    Activities: activity as tolerated    Nursing:   SN to complete comprehensive assessment including routine vital signs. Instruct on disease process and s/s of complications to report to MD. Review/verify medication list sent home with the patient at time of discharge  and instruct patient/caregiver as needed. Frequency may be adjusted depending on start of care date.    Notify MD if SBP > 160 or < 90; DBP > 90 or < 50; HR > 120 or < 50; Temp >  101;     CONSULTS:    Physical Therapy to evaluate and treat. Evaluate for home safety and equipment needs; Establish/upgrade home exercise program. Perform / instruct on therapeutic exercises, gait training, transfer training, and Range of Motion.  Occupational Therapy to evaluate and treat. Evaluate home environment for safety and equipment needs. Perform/Instruct on transfers, ADL training, ROM, and therapeutic exercises.  Aide to provide assistance with personal care, ADLs, and vital signs.    WOUND CARE ORDERS  The patient has an unstageable pressure injury to the sacral area, present on admission to hospital.  The wound is 0.8 x 0.6 cm with base obscured by yellow slough and thin tan eschar.  The patient is cachectic in appearance and his weight is 70.3 kg per chart.  He appears to weigh less than that.  The patient has no muscle mass over bony prominences, cannot extend his knees to full extension, and the skin on his legs is leathery in appearance. The patient is legally blind, though declines assistance with meal set up. Recommend bordered sacral foam dressing to sacral area and low air loss mattress.  The patient is on boost dietary supplement.      Medications: Review discharge medications with patient and family and provide education.      Current Discharge Medication List      START taking these medications    Details   amoxicillin-clavulanate 875-125mg (AUGMENTIN) 875-125 mg per tablet Take 1 tablet by mouth 2 (two) times daily. for 4 days  Qty: 8 tablet, Refills: 0      azithromycin (Z-LEXII) 250 MG tablet Take 1 tablet (250 mg total) by mouth once daily. for 2 days  Qty: 2 tablet, Refills: 0      benzonatate (TESSALON) 100 MG capsule Take 1 capsule (100 mg total) by mouth 3 (three) times daily as needed for Cough.  Qty: 30 capsule, Refills: 0      cyanocobalamin (VITAMIN B-12) 1000 MCG tablet Take 1 tablet (1,000 mcg total) by mouth once daily.  Qty: 30 tablet, Refills: 0      folic acid (FOLVITE) 1 MG  tablet Take 1 tablet (1 mg total) by mouth once daily.  Qty: 30 tablet, Refills: 0      oseltamivir (TAMIFLU) 75 MG capsule Take 1 capsule (75 mg total) by mouth 2 (two) times daily. for 2 days  Qty: 4 capsule, Refills: 0         CONTINUE these medications which have CHANGED    Details   metoprolol tartrate (LOPRESSOR) 25 MG tablet Take 0.5 tablets (12.5 mg total) by mouth 2 (two) times daily.  Qty: 30 tablet, Refills: 11         CONTINUE these medications which have NOT CHANGED    Details   !! warfarin (COUMADIN) 5 MG tablet take 1 tablet by mouth once daily  Qty: 30 tablet, Refills: 3      blood sugar diagnostic Strp 1 strip by Misc.(Non-Drug; Combo Route) route once daily.  Qty: 100 strip, Refills: 4    Comments: Brand preferred by insurance provider (medicare part B)  Associated Diagnoses: Type 2 diabetes mellitus with complication, without long-term current use of insulin      blood-glucose meter kit Use as instructed  Qty: 1 each, Refills: 0    Comments: Brand preferred by insurance provider (medicare part B)  Associated Diagnoses: Type 2 diabetes mellitus with complication, without long-term current use of insulin      econazole nitrate 1 % cream AAA bid to feet  Qty: 85 g, Refills: 2    Associated Diagnoses: Tinea pedis of both feet      lancets Misc 1 Units by Misc.(Non-Drug; Combo Route) route once daily.  Qty: 100 each, Refills: 4    Comments: Brand preferred by insurance provider (medicare part B)  Associated Diagnoses: Type 2 diabetes mellitus with complication, without long-term current use of insulin      !! warfarin (COUMADIN) 1 MG tablet TAKE 1 TABLET BY MOUTH EVERY DAY  Qty: 90 tablet, Refills: 3    Comments: **Patient requests 90 days supply**       !! - Potential duplicate medications found. Please discuss with provider.      STOP taking these medications       amoxicillin (AMOXIL) 500 MG Tab Comments:   Reason for Stopping:         HYDROcodone-acetaminophen (NORCO) 7.5-325 mg per tablet  Comments:   Reason for Stopping:         HYDROcodone-acetaminophen (NORCO) 7.5-325 mg per tablet Comments:   Reason for Stopping:         HYDROcodone-acetaminophen (NORCO) 7.5-325 mg per tablet Comments:   Reason for Stopping:         nystatin-triamcinolone (MYCOLOG II) cream Comments:   Reason for Stopping:               I certify that this patient is confined to his home and needs intermittent skilled nursing care, physical therapy and occupational therapy.

## 2020-01-24 NOTE — PLAN OF CARE
01/24/20 5685   Discharge Reassessment   Assessment Type Discharge Planning Reassessment   Provided patient/caregiver education on the expected discharge date and the discharge plan Yes   Do you have any problems affording any of your prescribed medications? No   Discharge Plan A Home Health     Pt not medically cleared for discharge.  CM to re-evaluate on date of discharge.  PHN assigned [facility] as HH company.   Information added to AVS and referral completed in [facility].

## 2020-01-24 NOTE — PT/OT/SLP PROGRESS
Occupational Therapy   Treatment    Name: Chris Asencio  MRN: [id number]  Admitting Diagnosis:  Pneumonia of left lower lobe due to infectious organism       Recommendations:     Discharge Recommendations: home health OT, home health PT  Discharge Equipment Recommendations:  bedside commode  Barriers to discharge:  None    Assessment:     Chris Asencio is a 88 y.o. male with a medical diagnosis of Pneumonia of left lower lobe due to infectious organism.  He presents with no concerns.. Performance deficits affecting function are impaired functional mobilty, impaired self care skills, impaired balance, decreased upper extremity function, decreased lower extremity function, decreased ROM, decreased safety awareness, visual deficits.  He completed a bed bath with Min A VC for completeness, assist for washing distal feet and back only.  He was SPV to roll in bed for bathing with Max A needed for hospital gown change and Mod A LBD (doff/don socks).  Continuation of OT treatment is needed to maximize function while in the hospital.    Rehab Prognosis:  Good; patient would benefit from acute skilled OT services to address these deficits and reach maximum level of function.       Plan:     Patient to be seen 5 x/week to address the above listed problems via self-care/home management, therapeutic activities, therapeutic exercises  · Plan of Care Expires: 02/22/20  · Plan of Care Reviewed with: patient    Subjective     Pain/Comfort:  · Pain Rating 1: 0/10  · Pain Rating Post-Intervention 1: 0/10    Objective:     Communicated with: patient and his nurse prior to session.  Patient found left sidelying with Condom Catheter, telemetry, peripheral IV upon OT entry to room.  He was agreeable to OT treatment.    General Precautions: Standard, blind, fall   Orthopedic Precautions:N/A   Braces: N/A     Occupational Performance:     Bed Mobility:    · SPV roll Right  · SPV roll Left     Functional Mobility/Transfers:         None    Activities of Daily Living:  · Max A UBD (hospital gown change) bed level  · Min A sponge bath bed level (VC for sequencing and completeness, assist with washing distal feet and back only)  · Mod A LBD (doff/don socks) bed level    [facility] 6 Click ADL: 12    Treatment & Education:  Education: condom catheter and heart monitor (purpose discussed with pt feeling objects to under stand their use and location, pt understood extra cautious about touching them.    Patient left supine with all lines intact, call button in reach, bed alarm off and PTA presentEducation:  , preparing to initiate PT treatment, understood to place pt in Right sidelying at end of session.    GOALS:   Multidisciplinary Problems     Occupational Therapy Goals        Problem: Occupational Therapy Goal    Goal Priority Disciplines Outcome Interventions   Occupational Therapy Goal     OT, PT/OT Ongoing, Progressing    Description:  Goals to be met by 1/29/20    Mod A self-feeing supported sitting  Min G/H (2 tasks) seated  Mod A UBD (patient's clothing)  Assess toilet hygiene at [facility]     COMPLETED GOALS  Assess self-feeding MET 1/23/20                      Time Tracking:     OT Date of Treatment: 01/24/20  OT Start Time: 1004  OT Stop Time: 1043  OT Total Time (min): 39 min    Billable Minutes:Self Care/Home Management 39    Shelly Benitez, [place], LOTR  1/24/2020

## 2020-01-24 NOTE — PLAN OF CARE
Problem: Occupational Therapy Goal  Goal: Occupational Therapy Goal  Description  Goals to be met by 1/29/20    Mod A self-feeing supported sitting  Min G/H (2 tasks) seated  Mod A UBD (patient's clothing)  Assess toilet hygiene at [facility]     COMPLETED GOALS  Assess self-feeding MET 1/23/20     Outcome: Ongoing, Progressing   Pt completed a bed bath with Mod A VC for completeness, assist for washing distal feet and back only.  He was SPV to roll in bed for bathing with Max A needed for hospital gown change.  Shelly Benitez, ScD, LOTR 1/24/2020

## 2020-01-24 NOTE — PLAN OF CARE
Problem: Physical Therapy Goal  Goal: Physical Therapy Goal  Description    Goals to be met by: 2/10/2020       Patient will increase functional independence with mobility by performin. Supine to sit with Contact Guard Assistance  2. Sit to supine with MInimal Assistance  3. Bed to chair transfer with Minimal Assistance   4.  Pt OOB to chair > 1 hr     Outcome: Ongoing, Progressing   Pt min./modA for bed mobility, sat up EOB x 25 min. with SBA, stood 3x's using BUE's on bedrails and min.A, pt stood ~:10 sec. Ea trial with min.A. O2:94% at rest on RA, 97% with activity on RA. HHPT and 24/7 assistance.

## 2020-01-24 NOTE — PHYSICIAN QUERY
"PT Name: Chris Asencio  MR #: [id number]    Physician Query Form - Nutrition Clarification     CDS: Magdalene Deal RN, CCDS         Contact information :[phone]  [email or web address]       This form is a permanent document in the medical record.     Query Date: January 24, 2020    By submitting this query, we are merely seeking further clarification of documentation.. Please utilize your independent clinical judgment when addressing the question(s) below.    The Medical record contains the following:   Indicators  Supporting Clinical Findings Location in Medical Record   x % of Estimated Energy Intake over a time frame from p.o., TF, or TPN Appetite poor, fluid intake poor. Patient did eat banana and 2 boosts for breakfast.   RN note 1/22/20    Weight Status over a time frame     x Subcutaneous Fat and/or Muscle Loss The patient has no muscle mass over bony prominences   Wound care consult 1/22/20    Fluid Accumulation or Edema      Reduced  Strength     x Wt / BMI / Usual Body Weight Body mass index is 22.89 kg/m². H&P 1/21/20    Delayed Wound Healing / Failure to Thrive The patient has an unstageable pressure injury to the sacral area, present on admission to hospital.  The wound is 0.8 x 0.6 cm with base obscured by yellow slough and thin tan eschar." Discharge summary 1/24/20   x Acute or Chronic Illness Pneumonia of left lower lobe due to infectious organism  PMH of AFib, hypertension, NIDDM-2, lymphedema, legal blindness  Pressure Ulcers - Wound Consult placed -  Discharge summary 1/24/20      Medication     x Treatment The patient is on boost dietary supplement".   Hospital Medicine PN 1/23/20   x Other The patient is cachectic in appearance and his weight is 70.3 kg per chart. He appears to weigh less than that. The patient has no muscle mass over bony prominences, cannot extend his knees to full extension, and the skin on his legs is leathery in appearance.   Hospital Medicine PN 1/23/20 "     AND / ASPEN Clinical Characteristics (October 2011)  A minimum of two characteristics is recommended for diagnosing either moderate or severe malnutrition   Mild Malnutrition Moderate Malnutrition Severe Malnutrition   Energy Intake from p.o., TF or TPN. < 75% intake of estimated energy needs for less than 7 days < 75% intake of estimated energy needs for greater than 7 days < 50% intake of estimated energy needs for > 5 days   Weight Loss 1-2% in 1 month  5% in 3 months  7.5% in 6 months  10% in 1 year 1-2 % in 1 week  5% in 1 month  7.5% in 3 months  10% in 6 months  20% in 1 year > 2% in 1 week  > 5% in 1 month  > 7.5% in 3 months  > 10% in 6 months  > 20% in 1 year   Physical Findings     None *Mild subcutaneous fat and/or muscle loss  *Mild fluid accumulation  *Stage II decubitus  *Surgical wound or non-healing wound *Mod/severe subcutaneous fat and/or muscle loss  *Mod/severe fluid accumulation  *Stage III or IV decubitus  *Non-healing surgical wound     Doctor, please specify diagnosis or diagnoses associated with above clinical findings.    [   ] Mild Protein-Calorie Malnutrition   [ x  ] Moderate Protein-Calorie Malnutrition   [   ] Cachexia   [   ] Other Nutritional Diagnosis (please specify):    [   ] Other:    [  ] Clinically Undetermined       Please document in your progress notes daily for the duration of treatment until resolved and include in your discharge summary.

## 2020-01-24 NOTE — SUBJECTIVE & OBJECTIVE
Interval History: Patient overall stable overnight.  Feeling slightly better from admission.      Review of Systems   Constitutional: Negative for activity change, appetite change, chills, diaphoresis and fever.   Eyes:        Patient is legally blind.    Respiratory: Positive for cough. Negative for chest tightness, shortness of breath and wheezing.    Cardiovascular: Negative for chest pain and palpitations.   Gastrointestinal: Negative for abdominal pain, constipation, diarrhea, nausea and vomiting.   Musculoskeletal: Negative for back pain, joint swelling, myalgias, neck pain and neck stiffness.   Skin: Negative for rash and wound.   Neurological: Negative for dizziness, weakness, light-headedness and headaches.   Hematological: Does not bruise/bleed easily.   Psychiatric/Behavioral: Negative for confusion and decreased concentration.     Objective:     Vital Signs (Most Recent):  Temp: 97.5 °F (36.4 °C) (01/24/20 0448)  Pulse: 87 (01/24/20 0448)  Resp: 16 (01/24/20 0448)  BP: (!) 92/52 (01/24/20 0448)  SpO2: 96 % (01/24/20 0448) Vital Signs (24h Range):  Temp:  [97.5 °F (36.4 °C)-98.5 °F (36.9 °C)] 97.5 °F (36.4 °C)  Pulse:  [56-88] 87  Resp:  [16-24] 16  SpO2:  [93 %-96 %] 96 %  BP: ()/(52-89) 92/52     Weight: 70.3 kg (155 lb)  Body mass index is 22.89 kg/m².    Intake/Output Summary (Last 24 hours) at 1/24/2020 0540  Last data filed at 1/24/2020 0000  Gross per 24 hour   Intake 660 ml   Output 1125 ml   Net -465 ml      Physical Exam   Constitutional: He is oriented to person, place, and time. Vital signs are normal. He appears well-developed and well-nourished.  Non-toxic appearance. He does not have a sickly appearance. He does not appear ill. No distress.   Thin, frail, elderly male. Very dry skin.    HENT:   Head: Normocephalic and atraumatic.   Right Ear: External ear normal.   Left Ear: External ear normal.   Tacky oral mucous membranes.   Eyes: Pupils are equal, round, and reactive to light.  Conjunctivae and EOM are normal. No scleral icterus.   Neck: Normal range of motion. Neck supple. No JVD present. No tracheal deviation present.   Cardiovascular: Normal rate, regular rhythm, normal heart sounds and intact distal pulses. Exam reveals no gallop and no friction rub.   No murmur heard.  Pulmonary/Chest: Effort normal. No stridor. No respiratory distress. He has no wheezes. He has rales (diffuse in left lower and mid lung fields.).   Abdominal: Soft. Bowel sounds are normal. He exhibits no distension and no mass. There is no tenderness. There is no guarding.   Neurological: He is alert and oriented to person, place, and time. No cranial nerve deficit. He exhibits normal muscle tone. Coordination normal.   Skin: Skin is warm and dry. He is not diaphoretic.   Psychiatric: He has a normal mood and affect. His behavior is normal. Judgment and thought content normal.   Nursing note and vitals reviewed.      Significant Labs: All pertinent labs within the past 24 hours have been reviewed.    Significant Imaging: I have reviewed all pertinent imaging results/findings within the past 24 hours.

## 2020-01-25 VITALS
RESPIRATION RATE: 18 BRPM | DIASTOLIC BLOOD PRESSURE: 59 MMHG | HEIGHT: 69 IN | SYSTOLIC BLOOD PRESSURE: 101 MMHG | WEIGHT: 155 LBS | BODY MASS INDEX: 22.96 KG/M2 | TEMPERATURE: 98 F | OXYGEN SATURATION: 100 % | HEART RATE: 80 BPM

## 2020-01-25 LAB
ALBUMIN SERPL BCP-MCNC: 1.9 G/DL (ref 3.5–5.2)
ANION GAP SERPL CALC-SCNC: 6 MMOL/L (ref 8–16)
BACTERIA SPEC AEROBE CULT: ABNORMAL
BACTERIA SPEC AEROBE CULT: ABNORMAL
BUN SERPL-MCNC: 10 MG/DL (ref 8–23)
CALCIUM SERPL-MCNC: 7.8 MG/DL (ref 8.7–10.5)
CHLORIDE SERPL-SCNC: 110 MMOL/L (ref 95–110)
CO2 SERPL-SCNC: 23 MMOL/L (ref 23–29)
CREAT SERPL-MCNC: 0.5 MG/DL (ref 0.5–1.4)
EST. GFR  (AFRICAN AMERICAN): >60 ML/MIN/1.73 M^2
EST. GFR  (NON AFRICAN AMERICAN): >60 ML/MIN/1.73 M^2
GLUCOSE SERPL-MCNC: 74 MG/DL (ref 70–110)
GRAM STN SPEC: ABNORMAL
INR PPP: 1.1 (ref 0.8–1.2)
POCT GLUCOSE: 124 MG/DL (ref 70–110)
POCT GLUCOSE: 71 MG/DL (ref 70–110)
POTASSIUM SERPL-SCNC: 3.2 MMOL/L (ref 3.5–5.1)
PROTHROMBIN TIME: 12.3 SEC (ref 9–12.5)
SODIUM SERPL-SCNC: 139 MMOL/L (ref 136–145)

## 2020-01-25 PROCEDURE — 36415 COLL VENOUS BLD VENIPUNCTURE: CPT

## 2020-01-25 PROCEDURE — 63600175 PHARM REV CODE 636 W HCPCS: Performed by: INTERNAL MEDICINE

## 2020-01-25 PROCEDURE — 82040 ASSAY OF SERUM ALBUMIN: CPT

## 2020-01-25 PROCEDURE — 99233 PR SUBSEQUENT HOSPITAL CARE,LEVL III: ICD-10-PCS | Mod: ,,, | Performed by: INTERNAL MEDICINE

## 2020-01-25 PROCEDURE — 63600175 PHARM REV CODE 636 W HCPCS: Performed by: PHYSICIAN ASSISTANT

## 2020-01-25 PROCEDURE — 85610 PROTHROMBIN TIME: CPT

## 2020-01-25 PROCEDURE — 25000003 PHARM REV CODE 250: Performed by: PHYSICIAN ASSISTANT

## 2020-01-25 PROCEDURE — 80048 BASIC METABOLIC PNL TOTAL CA: CPT

## 2020-01-25 PROCEDURE — 99233 SBSQ HOSP IP/OBS HIGH 50: CPT | Mod: ,,, | Performed by: INTERNAL MEDICINE

## 2020-01-25 PROCEDURE — 25000003 PHARM REV CODE 250: Performed by: INTERNAL MEDICINE

## 2020-01-25 RX ORDER — METOPROLOL TARTRATE 25 MG/1
12.5 TABLET, FILM COATED ORAL 2 TIMES DAILY
Qty: 30 TABLET | Refills: 11 | Status: ON HOLD | OUTPATIENT
Start: 2020-01-25 | End: 2020-02-18 | Stop reason: HOSPADM

## 2020-01-25 RX ORDER — POTASSIUM CHLORIDE 20 MEQ/15ML
40 SOLUTION ORAL ONCE
Status: COMPLETED | OUTPATIENT
Start: 2020-01-25 | End: 2020-01-25

## 2020-01-25 RX ADMIN — HEPARIN SODIUM 5000 UNITS: 5000 INJECTION, SOLUTION INTRAVENOUS; SUBCUTANEOUS at 05:01

## 2020-01-25 RX ADMIN — OSELTAMIVIR PHOSPHATE 75 MG: 6 POWDER, FOR SUSPENSION ORAL at 08:01

## 2020-01-25 RX ADMIN — POTASSIUM CHLORIDE 40 MEQ: 40 SOLUTION ORAL at 08:01

## 2020-01-25 RX ADMIN — SODIUM CHLORIDE: 0.9 INJECTION, SOLUTION INTRAVENOUS at 01:01

## 2020-01-25 RX ADMIN — FOLIC ACID 1 MG: 1 TABLET ORAL at 08:01

## 2020-01-25 RX ADMIN — CYANOCOBALAMIN TAB 1000 MCG 1000 MCG: 1000 TAB at 08:01

## 2020-01-25 RX ADMIN — METOPROLOL TARTRATE 12.5 MG: 25 TABLET, FILM COATED ORAL at 08:01

## 2020-01-25 NOTE — SUBJECTIVE & OBJECTIVE
Interval History: Patient overall stable overnight.  Had hypotension yesterday, so discharge held.  On chart review, his last outpatient visit had a SBP of 80, so this is around his recent baseline.  Patient is asymptomatic and did well with PT/OT yesterday.      Review of Systems   Constitutional: Negative for activity change, appetite change, chills, diaphoresis and fever.   Eyes:        Patient is legally blind.    Respiratory: Positive for cough. Negative for chest tightness, shortness of breath and wheezing.    Cardiovascular: Negative for chest pain and palpitations.   Gastrointestinal: Negative for abdominal pain, constipation, diarrhea, nausea and vomiting.   Musculoskeletal: Negative for back pain, joint swelling, myalgias, neck pain and neck stiffness.   Skin: Negative for rash and wound.   Neurological: Negative for dizziness, weakness, light-headedness and headaches.   Hematological: Does not bruise/bleed easily.   Psychiatric/Behavioral: Negative for confusion and decreased concentration.     Objective:     Vital Signs (Most Recent):  Temp: 97.5 °F (36.4 °C) (01/25/20 0515)  Pulse: 77 (01/25/20 0515)  Resp: 16 (01/25/20 0515)  BP: (!) 99/57 (01/25/20 0515)  SpO2: 96 % (01/25/20 0515) Vital Signs (24h Range):  Temp:  [97.5 °F (36.4 °C)-98.5 °F (36.9 °C)] 97.5 °F (36.4 °C)  Pulse:  [71-85] 77  Resp:  [16-22] 16  SpO2:  [94 %-99 %] 96 %  BP: ()/(43-64) 99/57     Weight: 70.3 kg (155 lb)  Body mass index is 22.89 kg/m².    Intake/Output Summary (Last 24 hours) at 1/25/2020 0555  Last data filed at 1/24/2020 1730  Gross per 24 hour   Intake 240 ml   Output 600 ml   Net -360 ml      Physical Exam   Constitutional: He is oriented to person, place, and time. Vital signs are normal. He appears well-developed and well-nourished.  Non-toxic appearance. He does not have a sickly appearance. He does not appear ill. No distress.   Thin, frail, elderly male. Very dry skin.    HENT:   Head: Normocephalic and  atraumatic.   Right Ear: External ear normal.   Left Ear: External ear normal.   Tacky oral mucous membranes.   Eyes: Pupils are equal, round, and reactive to light. Conjunctivae and EOM are normal. No scleral icterus.   Neck: Normal range of motion. Neck supple. No JVD present. No tracheal deviation present.   Cardiovascular: Normal rate, regular rhythm, normal heart sounds and intact distal pulses. Exam reveals no gallop and no friction rub.   No murmur heard.  Pulmonary/Chest: Effort normal. No stridor. No respiratory distress. He has no wheezes. He has rales (diffuse in left lower and mid lung fields.).   Abdominal: Soft. Bowel sounds are normal. He exhibits no distension and no mass. There is no tenderness. There is no guarding.   Neurological: He is alert and oriented to person, place, and time. No cranial nerve deficit. He exhibits normal muscle tone. Coordination normal.   Skin: Skin is warm and dry. He is not diaphoretic.   Psychiatric: He has a normal mood and affect. His behavior is normal. Judgment and thought content normal.   Nursing note and vitals reviewed.      Significant Labs: All pertinent labs within the past 24 hours have been reviewed.    Significant Imaging: I have reviewed all pertinent imaging results/findings within the past 24 hours.

## 2020-01-25 NOTE — DISCHARGE INSTRUCTIONS
Thank you for choosing [facility] as your Health Care Provider. [facility] strives to provide the best healthcare available to you. In the next few days you may receive a Survey, either by mail or email,  asking you to rate our care that was provided to you during your stay.  Please return the survey to us, as your feedback is important. We aim to meet your expectations of safe, quality health care.    From your [facility] Team.        Amoxicillin capsules or tablets  What is this medicine?  AMOXICILLIN (a mox i ROXY in) is a penicillin antibiotic. It is used to treat certain kinds of bacterial infections. It will not work for colds, flu, or other viral infections.  How should I use this medicine?  Take this medicine by mouth with a glass of water. Follow the directions on your prescription label. You may take this medicine with food or on an empty stomach. Take your medicine at regular intervals. Do not take your medicine more often than directed. Take all of your medicine as directed even if you think your are better. Do not skip doses or stop your medicine early.  Talk to your pediatrician regarding the use of this medicine in children. While this drug may be prescribed for selected conditions, precautions do apply.  What side effects may I notice from receiving this medicine?  Side effects that you should report to your doctor or health care professional as soon as possible:  · allergic reactions like skin rash, itching or hives, swelling of the face, lips, or tongue  · breathing problems  · dark urine  · redness, blistering, peeling or loosening of the skin, including inside the mouth  · seizures  · severe or watery diarrhea  · trouble passing urine or change in the amount of urine  · unusual bleeding or bruising  · unusually weak or tired  · yellowing of the eyes or skin  Side effects that usually do not require medical attention (report to your doctor or health care professional  if they continue or are bothersome):  · dizziness  · headache  · stomach upset  · trouble sleeping  What may interact with this medicine?  · amiloride  · birth control pills  · chloramphenicol  · macrolides  · probenecid  · sulfonamides  · tetracyclines  What if I miss a dose?  If you miss a dose, take it as soon as you can. If it is almost time for your next dose, take only that dose. Do not take double or extra doses.  Where should I keep my medicine?  Keep out of the reach of children.  Store between 68 and 77 degrees F (20 and 25 degrees C). Keep bottle closed tightly. Throw away any unused medicine after the expiration date.  What should I tell my health care provider before I take this medicine?  They need to know if you have any of these conditions:  · asthma  · kidney disease  · an unusual or allergic reaction to amoxicillin, other penicillins, cephalosporin antibiotics, other medicines, foods, dyes, or preservatives  · pregnant or trying to get pregnant  · breast-feeding  What should I watch for while using this medicine?  Tell your doctor or health care professional if your symptoms do not improve in 2 or 3 days. Take all of the doses of your medicine as directed. Do not skip doses or stop your medicine early.  If you are diabetic, you may get a false positive result for sugar in your urine with certain brands of urine tests. Check with your doctor.  Do not treat diarrhea with over-the-counter products. Contact your doctor if you have diarrhea that lasts more than 2 days or if the diarrhea is severe and watery.  NOTE:This sheet is a summary. It may not cover all possible information. If you have questions about this medicine, talk to your doctor, pharmacist, or health care provider. Copyright© 2017 [employer]        Azithromycin tablets  What is this medicine?  AZITHROMYCIN (az ith jennifer MYE sin) is a macrolide antibiotic. It is used to treat or prevent certain kinds of bacterial infections. It will not  work for colds, flu, or other viral infections.  How should I use this medicine?  Take this medicine by mouth with a full glass of water. Follow the directions on the prescription label. The tablets can be taken with food or on an empty stomach. If the medicine upsets your stomach, take it with food. Take your medicine at regular intervals. Do not take your medicine more often than directed. Take all of your medicine as directed even if you think your are better. Do not skip doses or stop your medicine early. Talk to your pediatrician regarding the use of this medicine in children. Special care may be needed.  What side effects may I notice from receiving this medicine?  Side effects that you should report to your doctor or health care professional as soon as possible:  · allergic reactions like skin rash, itching or hives, swelling of the face, lips, or tongue  · confusion, nightmares or hallucinations  · dark urine  · difficulty breathing  · hearing loss  · irregular heartbeat or chest pain  · pain or difficulty passing urine  · redness, blistering, peeling or loosening of the skin, including inside the mouth  · white patches or sores in the mouth  · yellowing of the eyes or skin  Side effects that usually do not require medical attention (report to your doctor or health care professional if they continue or are bothersome):  · diarrhea  · dizziness, drowsiness  · headache  · stomach upset or vomiting  · tooth discoloration  · vaginal irritation  What may interact with this medicine?  Do not take this medicine with any of the following medications:  · lincomycin  This medicine may also interact with the following medications:  · amiodarone  · antacids  · birth control pills  · cyclosporine  · digoxin  · magnesium  · nelfinavir  · phenytoin  · warfarin  What if I miss a dose?  If you miss a dose, take it as soon as you can. If it is almost time for your next dose, take only that dose. Do not take double or extra  doses.  Where should I keep my medicine?  Keep out of the reach of children.  Store at room temperature between 15 and 30 degrees C (59 and 86 degrees F). Throw away any unused medicine after the expiration date.  What should I tell my health care provider before I take this medicine?  They need to know if you have any of these conditions:  · kidney disease  · liver disease  · irregular heartbeat or heart disease  · an unusual or allergic reaction to azithromycin, erythromycin, other macrolide antibiotics, foods, dyes, or preservatives  · pregnant or trying to get pregnant  · breast-feeding  What should I watch for while using this medicine?  Tell your doctor or health care professional if your symptoms do not improve.  Do not treat diarrhea with over the counter products. Contact your doctor if you have diarrhea that lasts more than 2 days or if it is severe and watery.  This medicine can make you more sensitive to the sun. Keep out of the sun. If you cannot avoid being in the sun, wear protective clothing and use sunscreen. Do not use sun lamps or tanning beds/booths.  NOTE:This sheet is a summary. It may not cover all possible information. If you have questions about this medicine, talk to your doctor, pharmacist, or health care provider. Copyright© 2017 [employer]          Benzonatate capsules  What is this medicine?  BENZONATATE (swetha RAINA na schaefer) is used to treat cough.  How should I use this medicine?  Take this medicine by mouth with a glass of water. Follow the directions on the prescription label. Avoid breaking, chewing, or sucking the capsule, as this can cause serious side effects. Take your medicine at regular intervals. Do not take your medicine more often than directed.  Talk to your pediatrician regarding the use of this medicine in children. While this drug may be prescribed for children as young as 10 years old for selected conditions, precautions do apply.  What side effects may I notice from  receiving this medicine?  Side effects that you should report to your doctor or health care professional as soon as possible:  · allergic reactions like skin rash, itching or hives, swelling of the face, lips, or tongue  · breathing problems  · chest pain  · confusion or hallucinations  · irregular heartbeat  · numbness of mouth or throat  · seizures  Side effects that usually do not require medical attention (report to your doctor or health care professional if they continue or are bothersome):  · burning feeling in the eyes  · constipation  · headache  · nasal congestion  · stomach upset  What may interact with this medicine?  Do not take this medicine with any of the following medications:  · MAOIs like Carbex, Eldepryl, Marplan, Nardil, and Parnate  What if I miss a dose?  If you miss a dose, take it as soon as you can. If it is almost time for your next dose, take only that dose. Do not take double or extra doses.  Where should I keep my medicine?  Keep out of the reach of children.  Store at room temperature between 15 and 30 degrees C (59 and 86 degrees F). Keep tightly closed. Protect from light and moisture. Throw away any unused medicine after the expiration date.  What should I tell my health care provider before I take this medicine?  They need to know if you have any of these conditions:  · kidney or liver disease  · an unusual or allergic reaction to benzonatate, anesthetics, other medicines, foods, dyes, or preservatives  · pregnant or trying to get pregnant  · breast-feeding  What should I watch for while using this medicine?  Tell your doctor if your symptoms do not improve or if they get worse. If you have a high fever, skin rash, or headache, see your health care professional.  You may get drowsy or dizzy. Do not drive, use machinery, or do anything that needs mental alertness until you know how this medicine affects you. Do not sit or stand up quickly, especially if you are an older patient. This  reduces the risk of dizzy or fainting spells.  NOTE:This sheet is a summary. It may not cover all possible information. If you have questions about this medicine, talk to your doctor, pharmacist, or health care provider. Copyright© 2017 [employer]          Vitamin B-12  Does this test have other names?  VB12, serum cobalamin  What is this test?  This test measures the level of vitamin B-12 in your blood. You need this vitamin to make red blood cells and for your nervous system to function as it should.  You get vitamin B-12 from eating foods that come from animals, such as meat, eggs, and dairy products. Vitamin B-12 is also added to some cereals. You can also take this vitamin as a supplement in pill form.  Why do I need this test?  You may need this test if your healthcare provider suspects that your vitamin B-12 level is low. A low level of vitamin B-12 is called vitamin B-12 deficiency. You are more likely to have vitamin deficiency if you are an older adult, have a digestive disorder called malabsorption, have had gastrointestinal surgery, or eat a vegan diet. Women who are pregnant or breastfeeding and eat a vegetarian-type diet are at high risk for this deficiency.  You may also need this test if you've been diagnosed with or your healthcare provider suspects a disease called pernicious anemia. Pernicious anemia affects the lining of your stomach and makes it hard to absorb vitamin B-12.  These are common symptoms of vitamin B-12 deficiency:  · Fatigue  · Weakness  · Weight loss  · Tingling or numbness of the hands and feet  · Constipation  · Poor balance  · Confusion  · Depression  · Memory loss  · Soreness of the mouth or tongue  What other tests might I have along with this test?  Your healthcare provider may order other tests to help find out the cause of your vitamin B-12 deficiency. These tests may include:  · Complete blood count  · Peripheral blood smear, which involves looking at your blood cells  under a microscope  · Folic acid level. This vitamin is also important for red blood cell production.  What do my test results mean?  Many things may affect your lab test results. These include the method each lab uses to do the test. Even if your test results are different from the normal value, you may not have a problem. To learn what the results mean for you, talk with your healthcare provider.  Vitamin B-12 is measured in picograms per milliliter (pg/mL). Normal results are:  · 200 to 835 pg/mL for adults  · 160 to 1,300 pg/mL for newborns  If your results are low, you may have:  · Pernicious anemia  · Malabsorption from inflammatory bowel disease or other causes  · Poor absorption because of surgery  · Tapeworm infection  · Too little intake of animal protein  · Folic acid deficiency  · Iron deficiency  If your levels are high, you may have:  · Liver or kidney disease  · Diabetes  · Obesity  · White blood cell cancer  High levels may also mean that you have chronic obstructive pulmonary disease , congestive heart failure, or a thickening of the blood called polycythemia vera.  How is this test done?  The test requires a blood sample, which is drawn through a needle from a vein in your arm.  Does this test pose any risks?  Taking a blood sample with a needle carries risks that include bleeding, infection, bruising, or feeling dizzy. When the needle pricks your arm, you may feel a slight stinging sensation or pain. Afterward, the site may be slightly sore.  What might affect my test results?  Certain conditions may affect your test results. These include:  · Pregnancy  · Recent blood transfusions  · Smoking  Medicines in general may also affect your results. Specific medicines include supplements of vitamin A or C and birth control pills.  How do I get ready for this test?  You must fast before this test. You may be able to drink water, but you should not eat or drink anything else after midnight on the night  before the test. If you are not having the test in the morning, ask your healthcare provider how long you need to fast before the test. You should not have a vitamin B-12 injection before the test. Also be sure your provider knows about all medicines, herbs, vitamins, and supplements you are taking. This includes medicines that don't need a prescription and any illicit drugs you may use.  © 5995-7649 [employer]. [address] [place] [address], [place] [address]. All rights reserved. This information is not intended as a substitute for professional medical care. Always follow your healthcare professional's instructions.          Oseltamivir capsules  What is this medicine?  OSELTAMIVIR (os el CHILDRESS i vir) is an antiviral medicine. It is used to prevent and to treat some kinds of influenza or the flu. It will not work for colds or other viral infections.  How should I use this medicine?  Take this medicine by mouth with a glass of water. Follow the directions on the prescription label. Start this medicine at the first sign of flu symptoms. You can take it with or without food. If it upsets your stomach, take it with food. Take your medicine at regular intervals. Do not take your medicine more often than directed. Take all of your medicine as directed even if you think you are better. Do not skip doses or stop your medicine early.  Talk to your pediatrician regarding the use of this medicine in children. While this drug may be prescribed for children as young as 14 days for selected conditions, precautions do apply.  What side effects may I notice from receiving this medicine?  Side effects that you should report to your doctor or health care professional as soon as possible:  · allergic reactions like skin rash, itching or hives, swelling of the face, lips, or tongue  · anxiety, confusion, unusual behavior  · breathing problems  · hallucination, loss of contact with reality  · redness, blistering, peeling or  loosening of the skin, including inside the mouth  · seizures  Side effects that usually do not require medical attention (report to your doctor or health care professional if they continue or are bothersome):  · diarrhea  · headache  · nausea, vomiting  · pain  What may interact with this medicine?  Interactions are not expected.  What if I miss a dose?  If you miss a dose, take it as soon as you remember. If it is almost time for your next dose (within 2 hours), take only that dose. Do not take double or extra doses.  Where should I keep my medicine?  Keep out of the reach of children.  Store at room temperature between 15 and 30 degrees C (59 and 86 degrees F). Throw away any unused medicine after the expiration date.  What should I tell my health care provider before I take this medicine?  They need to know if you have any of the following conditions:  · heart disease  · immune system problems  · kidney disease  · liver disease  · lung disease  · an unusual or allergic reaction to oseltamivir, other medicines, foods, dyes, or preservatives  · pregnant or trying to get pregnant  · breast-feeding  What should I watch for while using this medicine?  Visit your doctor or health care professional for regular check ups. Tell your doctor if your symptoms do not start to get better or if they get worse.  If you have the flu, you may be at an increased risk of developing seizures, confusion, or abnormal behavior. This occurs early in the illness, and more frequently in children and teens. These events are not common, but may result in accidental injury to the patient. Families and caregivers of patients should watch for signs of unusual behavior and contact a doctor or health care professional right away if the patient shows signs of unusual behavior.  This medicine is not a substitute for the flu shot. Talk to your doctor each year about an annual flu shot.  NOTE:This sheet is a summary. It may not cover all possible  information. If you have questions about this medicine, talk to your doctor, pharmacist, or health care provider. Copyright© 2017 [employer]          Folate  Does this test have other names?  Vitamin B-9, folic acid test  What is this test?  This is a blood test to measure the concentration of folate in the liquid part of your blood, called serum, or in your red blood cells. The concentration in the red blood cells will be higher than in the serum.  Folate is a B vitamin naturally found in:  · Leafy green vegetables, such as spinach, kale, collards, and michelle lettuce  · Citrus fruits and juices  · Dried beans, lentils, and peas  · Yeast  · Liver  · Asparagus  · Broccoli  · Wheat germ  Many cereals, breads, and other grain products are fortified with folic acid, the synthetic version of folate.  Folate is needed to make red blood cells. It is also used to repair cells and to make DNA.  It also helps prevent cellular changes that may lead to cancer. Folate is also needed to help a baby's cells multiply during pregnancy. Low levels of folate during pregnancy can lead to brain or spine defects in the fetus. It can also lead to megaloblastic anemia. This is a type of anemia marked by fewer, but larger, red blood cells.  Why do I need this test?  You may have this test to find out the cause of anemia, look at your nutritional status, or monitor a previous folate deficiency.  If you have anemia, you don't have enough red blood cells to carry oxygen to the cells in your body. A folate deficiency is just one cause of anemia. If you don't get enough folate or folic acid from food or vitamins, you may end up with a folate deficiency. Symptoms include:  · Fatigue  · Weakness  · Pale skin, gums, eyes, and nails  · Mouth ulcers and a red, sore tongue  · Irritability  · Shortness of breath  · Weight loss  · Numbness and tingling of fingers and toes  · Forgetfulness  · Confusion  · Dizziness and fainting  · Nausea, vomiting,  diarrhea, although these are rare  What other tests might I have along with this test?  Your doctor may also order a vitamin B-12 test. Both folate and B-12 are important to for healthy red blood cells. A deficiency in either B-12 or folate can cause anemia.  What do my test results mean?  Many things may affect your lab test results. These include the method each lab uses to do the test. Even if your test results are different from the normal value, you may not have a problem. To learn what the results mean for you, talk with your healthcare provider.  For blood plasma or serum, a normal result ranges from 2 to 10 nanograms per milliliter (ng/mL) or 4 to 22 nanomoles per liter (nmol/L).  For red blood cells, a normal result ranges from 140 to 960 ng/mL or 550 to 2,200 nmol/L.  A test result that's lower than normal means you have a folate deficiency, and your doctor may recommend folic acid supplements. Once you begin taking supplements, the folate deficiency will go away within a few months. Your healthcare provider determines how much of a folic acid supplement you need based on your age and whether you are pregnant or breastfeeding.  Folate is water soluble, so any extra folate leaves your body in urine. But a buildup can sometimes happen during folic acid therapy.  How is this test done?  The test requires a blood sample, which is drawn through a needle from a vein in your arm.  Does this test pose any risks?  Taking a blood sample with a needle carries risks that include bleeding, infection, bruising, or feeling dizzy. When the needle pricks your arm, you may feel a slight stinging sensation or pain. Afterward, the site may be slightly sore.  What might affect my test results?  Many factors can contribute to a folate deficiency, including:  · Poor nutrition  · Being a vegetarian  · Drinking too much alcohol  · Advanced age  · Smoking  · Anti-seizure medicines  · Chemotherapy  · Pregnancy  · Recent  surgery  · Nutrition absorption problems (Crohn's or celiac disease)  · Kidney dialysis   How do I get ready for this test?  You don't need to prepare for this test. Be sure your doctor knows about all medicines, herbs, vitamins, and supplements you are taking. This includes medicines that don't need a prescription and any illicit drugs you may use.  © 4629-5101 [employer]. [address] [place] [address], [place] [address]. All rights reserved. This information is not intended as a substitute for professional medical care. Always follow your healthcare professional's instructions.

## 2020-01-25 NOTE — NURSING
"Discharge instructions given, no questions answered, pt states "My daughter is gonna take care of all of this". IV's removed with catheter intact. [facility] wheelchair van at bedside transporting pt home.   "

## 2020-01-25 NOTE — NURSING
Notified by PCT of pts BP of 76/43. Pt asymptomatic laying in bed. Notified Dr. Kumar who came to the bedside. Decision was made to cancel DC and monitor pt overnight. Bed low and locked, call light within reach, side rails up X2, son at bedside. Will continue to monitor.

## 2020-01-25 NOTE — PLAN OF CARE
Pt remained free of falls and injuries throughout shift. AAO x3. Purposeful hourly rounding performed. IV flushed and infusing. No complaints of pain. Worked with PT/OT at bedside. VSS on room air. Condom cath intact and to gravity. Bed low and locked, call light within reach, side rails up X2. Will continue to monitor.

## 2020-01-25 NOTE — PLAN OF CARE
ZEKE spoke to patients' sister Maureen, [phone] to informed her patient will discharge today from facility.  SW request ADT 30 placed by RN REBECCA for  1100am.  ZEKE informed patient home health agency of discharge today from facility.       01/25/20 0902   Final Note   Assessment Type Final Discharge Note   Anticipated Discharge Disposition Home-Health   Right Care Referral Info   Post Acute Recommendation Home-care   Facility Name [facility]

## 2020-01-25 NOTE — PLAN OF CARE
Pt AAOx4. Pt remains afebrile and VSS throughout shift. Poc reviewed with pt and questions answered. Encouraged and assisted pt with eating and intake of boost. Condom cath in place and bag down to gravity. Ana drainage noted. Q2hr turning maintained. Pt denies pain or discomfort this shift. Pt remains free from falls or injury. All needs and concerns met. Purposeful rounding done. Bed is locked and in lowest position, safety maintained, call light in reach. Will continue to monitor.

## 2020-01-25 NOTE — PROGRESS NOTES
[facility] Hospital Medicine  Progress Note    Patient Name: Chris Asencio  MRN: [id number]  Patient Class: IP- Inpatient   Admission Date: 1/21/2020  Length of Stay: 4 days  Attending Physician: Juan Kumar MD  Primary Care Provider: Arleen Garcia MD        Subjective:     Principal Problem:Pneumonia of left lower lobe due to infectious organism        HPI:  Mr. Chris Asencio is a 88 y.o. male, with PMH of AFib, hypertension, NIDDM-2, lymphedema, legal blindness, who [place] [facility] [place] via EMS from his home secondary to multiple viral illness type symptoms, primarily a nonproductive cough with fever x2 days.  EMS report included increased generalized body aches, weakness, fatigue.  He denies abdominal pain, chills.  His daughter endorsed that she treated with 3 doses of amoxicillin called in for him by his PCP.  But he has not had improvement.  He has not yet had a chest x-ray.  A family member in the home was noted to have said we are all sick and do not want to give him pneumonia.  In the ED he was evaluated with a chest x-ray showing left lingular and lower lobe pneumonia, he was started on Rocephin and azithromycin.  Labs did indicate that he is positive for influenza a, and he received 1st dose of Tamiflu in the ED.  He was admitted to inpatient status.    Overview/Hospital Course:  Patient overall feeling better since admission.  No reported adverse events overnight.  See Below.    Interval History: Patient overall stable overnight.  Had hypotension yesterday, so discharge held.  On chart review, his last outpatient visit had a SBP of 80, so this is around his recent baseline.  Patient is asymptomatic and did well with PT/OT yesterday.      Review of Systems   Constitutional: Negative for activity change, appetite change, chills, diaphoresis and fever.   Eyes:        Patient is legally blind.    Respiratory: Positive for cough. Negative for chest tightness,  shortness of breath and wheezing.    Cardiovascular: Negative for chest pain and palpitations.   Gastrointestinal: Negative for abdominal pain, constipation, diarrhea, nausea and vomiting.   Musculoskeletal: Negative for back pain, joint swelling, myalgias, neck pain and neck stiffness.   Skin: Negative for rash and wound.   Neurological: Negative for dizziness, weakness, light-headedness and headaches.   Hematological: Does not bruise/bleed easily.   Psychiatric/Behavioral: Negative for confusion and decreased concentration.     Objective:     Vital Signs (Most Recent):  Temp: 97.5 °F (36.4 °C) (01/25/20 0515)  Pulse: 77 (01/25/20 0515)  Resp: 16 (01/25/20 0515)  BP: (!) 99/57 (01/25/20 0515)  SpO2: 96 % (01/25/20 0515) Vital Signs (24h Range):  Temp:  [97.5 °F (36.4 °C)-98.5 °F (36.9 °C)] 97.5 °F (36.4 °C)  Pulse:  [71-85] 77  Resp:  [16-22] 16  SpO2:  [94 %-99 %] 96 %  BP: ()/(43-64) 99/57     Weight: 70.3 kg (155 lb)  Body mass index is 22.89 kg/m².    Intake/Output Summary (Last 24 hours) at 1/25/2020 0555  Last data filed at 1/24/2020 1730  Gross per 24 hour   Intake 240 ml   Output 600 ml   Net -360 ml      Physical Exam   Constitutional: He is oriented to person, place, and time. Vital signs are normal. He appears well-developed and well-nourished.  Non-toxic appearance. He does not have a sickly appearance. He does not appear ill. No distress.   Thin, frail, elderly male. Very dry skin.    HENT:   Head: Normocephalic and atraumatic.   Right Ear: External ear normal.   Left Ear: External ear normal.   Tacky oral mucous membranes.   Eyes: Pupils are equal, round, and reactive to light. Conjunctivae and EOM are normal. No scleral icterus.   Neck: Normal range of motion. Neck supple. No JVD present. No tracheal deviation present.   Cardiovascular: Normal rate, regular rhythm, normal heart sounds and intact distal pulses. Exam reveals no gallop and no friction rub.   No murmur heard.  Pulmonary/Chest:  Effort normal. No stridor. No respiratory distress. He has no wheezes. He has rales (diffuse in left lower and mid lung fields.).   Abdominal: Soft. Bowel sounds are normal. He exhibits no distension and no mass. There is no tenderness. There is no guarding.   Neurological: He is alert and oriented to person, place, and time. No cranial nerve deficit. He exhibits normal muscle tone. Coordination normal.   Skin: Skin is warm and dry. He is not diaphoretic.   Psychiatric: He has a normal mood and affect. His behavior is normal. Judgment and thought content normal.   Nursing note and vitals reviewed.      Significant Labs: All pertinent labs within the past 24 hours have been reviewed.    Significant Imaging: I have reviewed all pertinent imaging results/findings within the past 24 hours.      Assessment/Plan:   Patient is a 88 y.o. male, with PMH of AFib, hypertension, NIDDM-2, lymphedema, legal blindness, who presented to [facility] ED on 01/21/2020 via EMS from his home secondary to multiple viral illness type symptoms, primarily a nonproductive cough with fever x2 days.  EMS report included increased generalized body aches, weakness, fatigue.  He denies abdominal pain, chills.  His daughter endorsed that she treated with 3 doses of amoxicillin called in for him by his PCP.  But he has not had improvement.  He has not yet had a chest x-ray.  A family member in the home was noted to have said we are all sick and do not want to give him pneumonia.  In the ED he was evaluated with a chest x-ray showing left lingular and lower lobe pneumonia, he was started on Rocephin and azithromycin.  Labs did indicate that he is positive for influenza a, and he received 1st dose of Tamiflu in the ED.  He was admitted to inpatient status.     -ID/Pulmonary - LLL PNA and Influenza A.  On Ceftriaxone/Azithromycin and Tamiflu.  Legionella Ag pending.  Respiratory culture pending.  Lactate normal on admission. Procalcitonin slightly elevated  "at 0.36. WBC normal on admission.  RVP pending.  CURB 65 was 2 points (moderate risk).  Feeling a little better 1/24/2020 and 1/25/2020 with less coughing.  No SOB at rest.  Pox 96 on RA.     CXR on 1/21/2020 -New ground-glass airspace opacities in the left mid and lower lung zones.      -Cards - HTN.  Afib.  HFrEF.  On Metoprolol.  On Warfarin.  BNP normal on admission.  INR 1.0 on admission and 1.1 today.  Not on an ARB. Needs follow up TTE as outpatient.  Needs follow up for INR.     TTE in 5/2014 - Mild left atrial enlargement.     2 - Mildly to moderately depressed left ventricular systolic function (EF 40-45%).     3 - Normal left ventricular diastolic function.     4 - Normal right ventricular systolic function .     5 - Mild mitral regurgitation.     6 - Intermediate central venous pressure.     Had hypotension yesterday, so discharge held.  On chart review, his last outpatient visit had a SBP of 80, so this is around his recent baseline.  Decreased Metoprolol to 12.5mg bid for now. Patient is asymptomatic and did well with PT/OT yesterday.  BP stable today.    -Endocrine - T2D.  SSI.  A1C 4.7 on admission.  Stable Glucose levels.     -Heme - Microcytic Anemia - Hgb 12.3 on admission with MCV of 80.  Was 12.3 in 7/2019.  Hgb dropped to 9.3 on 1/23/2020 after hydration.  Will check Fe studies (Ferritin of 389 and Fe sat of 16%, B12/Folate (581/6.4 - vitamin B12 and Folic acid started), Haptoglobin normal.  LDH normal.  Check FOBT - ordered, but not yet done.       Hgb stable at 9.9 on 1/24/2020.    -Renal - Potassium a little low at 3.2 today.  Will give po KCl today.  Calcium low, but corrects with albumin.     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  The wound is 0.8 x 0.6 cm with base obscured by yellow slough and thin tan eschar.  The patient is cachectic in appearance and his weight is 70.3 kg " "per chart.  He appears to weigh less than that.  The patient has no muscle mass over bony prominences, cannot extend his knees to full extension, and the skin on his legs is leathery in appearance. The patient is legally blind, though declines assistance with meal set up. Recommend bordered sacral foam dressing to sacral area and low air loss mattress. The patient is on boost dietary supplement".     -DVT prophylaxis - on Warfarin, but INR not therapeutic.  On SQ Heparin until INR bumps.     -Dispo - PT/OT consulted for evaluation -  on discharge today    VTE Risk Mitigation (From admission, onward)         Ordered     heparin (porcine) injection 5,000 Units  Every 8 hours      01/23/20 0953     warfarin tablet 6 mg  Daily      01/22/20 0529     Place NOVA hose  Until discontinued      01/21/20 2205     Place sequential compression device  Until discontinued      01/21/20 2205     IP VTE HIGH RISK PATIENT  Once      01/21/20 2205     Place sequential compression device  Until discontinued      01/21/20 2205     Reason for No Pharmacological VTE Prophylaxis  Once     Question:  Reasons:  Answer:  Already adequately anticoagulated on oral Anticoagulants    01/21/20 2205                      Juan Kumar MD  Department of Hospital Medicine   [facility]  "

## 2020-01-26 PROCEDURE — G0180 MD CERTIFICATION HHA PATIENT: HCPCS | Mod: ,,, | Performed by: INTERNAL MEDICINE

## 2020-01-26 PROCEDURE — G0180 PR HOME HEALTH MD CERTIFICATION: ICD-10-PCS | Mod: ,,, | Performed by: INTERNAL MEDICINE

## 2020-01-26 NOTE — PT/OT/SLP DISCHARGE
Occupational Therapy Discharge Summary    Chris Asencio  MRN: [id number]   Principal Problem: Pneumonia of left lower lobe due to infectious organism      Patient Discharged from acute Occupational Therapy on 1/25/20  Please refer to prior OT note dated 1/24/20 for functional status.    Assessment:      Patient appropriate for care in another setting.    Objective:     GOALS:   Multidisciplinary Problems     Occupational Therapy Goals        Problem: Occupational Therapy Goal    Goal Priority Disciplines Outcome Interventions   Occupational Therapy Goal     OT, PT/OT Ongoing, Progressing    Description:  Goals to be met by 1/29/20    Mod A self-feeing supported sitting  Min G/H (2 tasks) seated  Mod A UBD (patient's clothing)  Assess toilet hygiene at [facility]     COMPLETED GOALS  Assess self-feeding MET 1/23/20                      Reasons for Discontinuation of Therapy Services  Transfer to alternate level of care.      Plan:     Patient Discharged to: Home with Home Health Service per chart.    ONEL Swann  1/25/2020

## 2020-01-27 ENCOUNTER — TELEPHONE (OUTPATIENT)
Dept: INTERNAL MEDICINE | Facility: CLINIC | Age: 85
End: 2020-01-27

## 2020-01-27 LAB
BACTERIA BLD CULT: NORMAL
BACTERIA BLD CULT: NORMAL
ENTEROVIRUS: NOT DETECTED
HUMAN BOCAVIRUS: NOT DETECTED
HUMAN CORONAVIRUS, COMMON COLD VIRUS: NOT DETECTED
INFLUENZA A - H1N1-09: POSITIVE
PARAINFLUENZA: NOT DETECTED
RVP - ADENOVIRUS: NOT DETECTED
RVP - HUMAN METAPNEUMOVIRUS (HMPV): NOT DETECTED
RVP - INFLUENZA A: NOT DETECTED
RVP - INFLUENZA B: NOT DETECTED
RVP - RESPIRATORY SYNCTIAL VIRUS (RSV) A: NOT DETECTED
RVP - RESPIRATORY VIRAL PANEL, SOURCE: ABNORMAL
RVP - RHINOVIRUS: NOT DETECTED

## 2020-01-27 NOTE — TELEPHONE ENCOUNTER
----- Message from Loren Gilman sent at 1/27/2020  2:00 PM CST -----  Contact: Katey--[facility]--[phone]  Needs Advice    Reason for call:  Katey needs to when she can get the  PTINR.        Communication Preference: Ktaey--[facility]--[phone]    Additional Information:    Katey is requesting a call back

## 2020-01-27 NOTE — TELEPHONE ENCOUNTER
Called and spoke with Katey and she advised that she was going to get PTINR today and she will fax termed the call

## 2020-01-30 ENCOUNTER — TELEPHONE (OUTPATIENT)
Dept: INTERNAL MEDICINE | Facility: CLINIC | Age: 85
End: 2020-01-30

## 2020-01-30 NOTE — TELEPHONE ENCOUNTER
Faxed requested documents to [facility] @ [phone] (fax)  Office number -[phone]

## 2020-01-30 NOTE — TELEPHONE ENCOUNTER
Patient has been on the same dose of Coumadin for some time.  Please confirm that he is not eating a lot of green leafy vegetables.  His INR is usually therapeutic when he takes medication consistently.  Please confirm that he is taking medication daily around the same time and avoiding foods that are high in vitamin K, like green leafy vegetables.

## 2020-01-30 NOTE — TELEPHONE ENCOUNTER
----- Message from Divina Gr sent at 1/30/2020  3:21 PM CST -----  Contact: Katey [phone] [facility]   Katey needs to speak to the nurse in regards to PT/INR for this patient. Please call and advise.

## 2020-02-03 ENCOUNTER — OFFICE VISIT (OUTPATIENT)
Dept: INTERNAL MEDICINE | Facility: CLINIC | Age: 85
End: 2020-02-03
Payer: MEDICARE

## 2020-02-03 VITALS
RESPIRATION RATE: 18 BRPM | HEART RATE: 60 BPM | BODY MASS INDEX: 24.33 KG/M2 | HEIGHT: 67 IN | SYSTOLIC BLOOD PRESSURE: 100 MMHG | TEMPERATURE: 98 F | DIASTOLIC BLOOD PRESSURE: 60 MMHG | WEIGHT: 155 LBS

## 2020-02-03 DIAGNOSIS — I48.20 CHRONIC ATRIAL FIBRILLATION: ICD-10-CM

## 2020-02-03 DIAGNOSIS — M79.673 PAIN OF FOOT, UNSPECIFIED LATERALITY: ICD-10-CM

## 2020-02-03 DIAGNOSIS — I10 ESSENTIAL HYPERTENSION: ICD-10-CM

## 2020-02-03 DIAGNOSIS — E11.8 TYPE 2 DIABETES MELLITUS WITH COMPLICATION, WITHOUT LONG-TERM CURRENT USE OF INSULIN: Primary | ICD-10-CM

## 2020-02-03 PROCEDURE — 99999 PR PBB SHADOW E&M-EST. PATIENT-LVL III: ICD-10-PCS | Mod: PBBFAC,,, | Performed by: INTERNAL MEDICINE

## 2020-02-03 PROCEDURE — 1159F MED LIST DOCD IN RCRD: CPT | Mod: S$GLB,,, | Performed by: INTERNAL MEDICINE

## 2020-02-03 PROCEDURE — 1125F PR PAIN SEVERITY QUANTIFIED, PAIN PRESENT: ICD-10-PCS | Mod: S$GLB,,, | Performed by: INTERNAL MEDICINE

## 2020-02-03 PROCEDURE — 99213 PR OFFICE/OUTPT VISIT, EST, LEVL III, 20-29 MIN: ICD-10-PCS | Mod: S$GLB,,, | Performed by: INTERNAL MEDICINE

## 2020-02-03 PROCEDURE — 99499 UNLISTED E&M SERVICE: CPT | Mod: S$GLB,,, | Performed by: INTERNAL MEDICINE

## 2020-02-03 PROCEDURE — 3288F FALL RISK ASSESSMENT DOCD: CPT | Mod: CPTII,S$GLB,, | Performed by: INTERNAL MEDICINE

## 2020-02-03 PROCEDURE — 1159F PR MEDICATION LIST DOCUMENTED IN MEDICAL RECORD: ICD-10-PCS | Mod: S$GLB,,, | Performed by: INTERNAL MEDICINE

## 2020-02-03 PROCEDURE — 99499 RISK ADDL DX/OHS AUDIT: ICD-10-PCS | Mod: S$GLB,,, | Performed by: INTERNAL MEDICINE

## 2020-02-03 PROCEDURE — 1125F AMNT PAIN NOTED PAIN PRSNT: CPT | Mod: S$GLB,,, | Performed by: INTERNAL MEDICINE

## 2020-02-03 PROCEDURE — 1100F PTFALLS ASSESS-DOCD GE2>/YR: CPT | Mod: CPTII,S$GLB,, | Performed by: INTERNAL MEDICINE

## 2020-02-03 PROCEDURE — 99999 PR PBB SHADOW E&M-EST. PATIENT-LVL III: CPT | Mod: PBBFAC,,, | Performed by: INTERNAL MEDICINE

## 2020-02-03 PROCEDURE — 99213 OFFICE O/P EST LOW 20 MIN: CPT | Mod: S$GLB,,, | Performed by: INTERNAL MEDICINE

## 2020-02-03 PROCEDURE — 1100F PR PT FALLS ASSESS DOC 2+ FALLS/FALL W/INJURY/YR: ICD-10-PCS | Mod: CPTII,S$GLB,, | Performed by: INTERNAL MEDICINE

## 2020-02-03 PROCEDURE — 3288F PR FALLS RISK ASSESSMENT DOCUMENTED: ICD-10-PCS | Mod: CPTII,S$GLB,, | Performed by: INTERNAL MEDICINE

## 2020-02-03 RX ORDER — HYDROCODONE BITARTRATE AND ACETAMINOPHEN 7.5; 325 MG/1; MG/1
1 TABLET ORAL EVERY 6 HOURS PRN
Qty: 30 TABLET | Refills: 0 | Status: SHIPPED | OUTPATIENT
Start: 2020-02-03

## 2020-02-03 NOTE — PROGRESS NOTES
CC: followup of hypertension and diabetes  HPI:  The patient is a 88 y.o. year old male who presents to the office for followup of hypertension and diabetes.  The patient denies any chest pain, shortness of breath, headache, excessive fatigue, nausea or vomiting.  He complains of foot pain, tightness sensation.  He has been out of pain medication.    PAST MEDICAL HISTORY:  Past Medical History:   Diagnosis Date    *Atrial fibrillation     Diabetes mellitus type II     Glaucoma     Hyperlipidemia     Hypertension     Kyphosis        SURGICAL HISTORY:  Past Surgical History:   Procedure Laterality Date    CHOLECYSTECTOMY         MEDS:  Medcard reviewed and updated    ALLERGIES: Allergy Card reviewed and updated    SOCIAL HISTORY:   The patient is a nonsmoker.    PE:   APPEARANCE: Well nourished, well developed, in no acute distress.    CHEST: Lungs clear to auscultation with unlabored respirations.  CARDIOVASCULAR: Irregularly, irregular S1, S2. No murmurs. No carotid bruits. No pedal edema.  ABDOMEN: Bowel sounds normal. Not distended. Soft. No tenderness or masses.   PSYCHIATRIC: The patient is oriented to person, place, and time and has a pleasant affect.        ASSESSMENT/PLAN:  Chris was seen today for follow-up.    Diagnoses and all orders for this visit:    Type 2 diabetes mellitus with complication, without long-term current use of insulin  -     hemoglobin A1c is normal    Essential hypertension  -     blood pressure is controlled    Chronic atrial fibrillation  -     on Coumadin, encouraged adherence to medication    Pain of foot, unspecified laterality  -     HYDROcodone-acetaminophen (NORCO) 7.5-325 mg per tablet; Take 1 tablet by mouth every 6 (six) hours as needed for Pain.

## 2020-02-05 ENCOUNTER — TELEPHONE (OUTPATIENT)
Dept: INTERNAL MEDICINE | Facility: CLINIC | Age: 85
End: 2020-02-05

## 2020-02-05 NOTE — TELEPHONE ENCOUNTER
----- Message from Gabriela Melgoza sent at 2/5/2020  9:54 AM CST -----  Contact: JOSE RAMON [facility]/ Katey [phone]  They retained an INR level on 1/29/20 and she is wondering when you would like her to recheck it. She said you can give a verbal order of fax it to [phone].    Thank you

## 2020-02-10 ENCOUNTER — TELEPHONE (OUTPATIENT)
Dept: INTERNAL MEDICINE | Facility: CLINIC | Age: 85
End: 2020-02-10

## 2020-02-10 NOTE — TELEPHONE ENCOUNTER
----- Message from Kev Gore sent at 2/10/2020  9:29 AM CST -----  Contact: [facility] [phone]  [facility] states if Dr. Garcia will be following this patient on home health services, please advise

## 2020-02-10 NOTE — TELEPHONE ENCOUNTER
Called [facility] and advised per PCP she will oversee his care. Termed the call spoke with Tamara

## 2020-02-11 ENCOUNTER — TELEPHONE (OUTPATIENT)
Dept: INTERNAL MEDICINE | Facility: CLINIC | Age: 85
End: 2020-02-11

## 2020-02-11 ENCOUNTER — HOSPITAL ENCOUNTER (INPATIENT)
Facility: OTHER | Age: 85
LOS: 7 days | Discharge: HOSPICE | DRG: 871 | End: 2020-02-18
Attending: EMERGENCY MEDICINE | Admitting: HOSPITALIST
Payer: MEDICARE

## 2020-02-11 DIAGNOSIS — L89.150 PRESSURE INJURY OF SACRAL REGION, UNSTAGEABLE: ICD-10-CM

## 2020-02-11 DIAGNOSIS — L97.519 ULCER OF GREAT TOE, RIGHT, WITH UNSPECIFIED SEVERITY: ICD-10-CM

## 2020-02-11 DIAGNOSIS — I10 ESSENTIAL HYPERTENSION: ICD-10-CM

## 2020-02-11 DIAGNOSIS — N39.0 URINARY TRACT INFECTION WITHOUT HEMATURIA, SITE UNSPECIFIED: Primary | ICD-10-CM

## 2020-02-11 DIAGNOSIS — E11.51 DIABETES MELLITUS WITH PERIPHERAL CIRCULATORY DISORDER: ICD-10-CM

## 2020-02-11 DIAGNOSIS — J18.9 PNEUMONIA OF LEFT LOWER LOBE DUE TO INFECTIOUS ORGANISM: ICD-10-CM

## 2020-02-11 DIAGNOSIS — E11.8 TYPE II DIABETES MELLITUS WITH COMPLICATION: ICD-10-CM

## 2020-02-11 DIAGNOSIS — N39.0 SEPSIS DUE TO URINARY TRACT INFECTION: ICD-10-CM

## 2020-02-11 DIAGNOSIS — R23.8 SKIN BREAKDOWN: ICD-10-CM

## 2020-02-11 DIAGNOSIS — I87.2 VENOUS INSUFFICIENCY: ICD-10-CM

## 2020-02-11 DIAGNOSIS — R19.7 DIARRHEA, UNSPECIFIED TYPE: ICD-10-CM

## 2020-02-11 DIAGNOSIS — I50.9 HEART FAILURE: ICD-10-CM

## 2020-02-11 DIAGNOSIS — A41.9 SEPSIS: ICD-10-CM

## 2020-02-11 DIAGNOSIS — J10.1 INFLUENZA A: ICD-10-CM

## 2020-02-11 DIAGNOSIS — L60.3 DYSTROPHIC NAIL: ICD-10-CM

## 2020-02-11 DIAGNOSIS — L85.9 HYPERKERATOSIS: ICD-10-CM

## 2020-02-11 DIAGNOSIS — A41.9 SEPSIS DUE TO URINARY TRACT INFECTION: ICD-10-CM

## 2020-02-11 PROBLEM — I95.9 HYPOTENSION: Status: ACTIVE | Noted: 2020-02-11

## 2020-02-11 LAB
ALBUMIN SERPL BCP-MCNC: 2.8 G/DL (ref 3.5–5.2)
ALP SERPL-CCNC: 66 U/L (ref 55–135)
ALT SERPL W/O P-5'-P-CCNC: 7 U/L (ref 10–44)
ANION GAP SERPL CALC-SCNC: 7 MMOL/L (ref 8–16)
APTT BLDCRRT: 27.4 SEC (ref 21–32)
AST SERPL-CCNC: 13 U/L (ref 10–40)
BACTERIA #/AREA URNS HPF: ABNORMAL /HPF
BASOPHILS # BLD AUTO: 0.01 K/UL (ref 0–0.2)
BASOPHILS NFR BLD: 0.2 % (ref 0–1.9)
BILIRUB SERPL-MCNC: 1.9 MG/DL (ref 0.1–1)
BILIRUB UR QL STRIP: ABNORMAL
BUN SERPL-MCNC: 18 MG/DL (ref 8–23)
CALCIUM SERPL-MCNC: 8.9 MG/DL (ref 8.7–10.5)
CHLORIDE SERPL-SCNC: 105 MMOL/L (ref 95–110)
CLARITY UR: CLEAR
CO2 SERPL-SCNC: 27 MMOL/L (ref 23–29)
COLOR UR: ABNORMAL
CREAT SERPL-MCNC: 0.7 MG/DL (ref 0.5–1.4)
CTP QC/QA: YES
DIFFERENTIAL METHOD: ABNORMAL
EOSINOPHIL # BLD AUTO: 0.1 K/UL (ref 0–0.5)
EOSINOPHIL NFR BLD: 1.2 % (ref 0–8)
ERYTHROCYTE [DISTWIDTH] IN BLOOD BY AUTOMATED COUNT: 17.1 % (ref 11.5–14.5)
EST. GFR  (AFRICAN AMERICAN): >60 ML/MIN/1.73 M^2
EST. GFR  (NON AFRICAN AMERICAN): >60 ML/MIN/1.73 M^2
GLUCOSE SERPL-MCNC: 72 MG/DL (ref 70–110)
GLUCOSE UR QL STRIP: NEGATIVE
HCT VFR BLD AUTO: 33.5 % (ref 40–54)
HGB BLD-MCNC: 11.3 G/DL (ref 14–18)
HGB UR QL STRIP: ABNORMAL
HYALINE CASTS #/AREA URNS LPF: 1 /LPF
IMM GRANULOCYTES # BLD AUTO: 0.01 K/UL (ref 0–0.04)
IMM GRANULOCYTES NFR BLD AUTO: 0.2 % (ref 0–0.5)
INR PPP: 1.1 (ref 0.8–1.2)
KETONES UR QL STRIP: ABNORMAL
LACTATE SERPL-SCNC: 1 MMOL/L (ref 0.5–2.2)
LACTATE SERPL-SCNC: 1.8 MMOL/L (ref 0.5–2.2)
LEUKOCYTE ESTERASE UR QL STRIP: ABNORMAL
LYMPHOCYTES # BLD AUTO: 1.7 K/UL (ref 1–4.8)
LYMPHOCYTES NFR BLD: 27.6 % (ref 18–48)
MAGNESIUM SERPL-MCNC: 1.6 MG/DL (ref 1.6–2.6)
MCH RBC QN AUTO: 27.9 PG (ref 27–31)
MCHC RBC AUTO-ENTMCNC: 33.7 G/DL (ref 32–36)
MCV RBC AUTO: 83 FL (ref 82–98)
MICROSCOPIC COMMENT: ABNORMAL
MONOCYTES # BLD AUTO: 0.6 K/UL (ref 0.3–1)
MONOCYTES NFR BLD: 9.1 % (ref 4–15)
NEUTROPHILS # BLD AUTO: 3.7 K/UL (ref 1.8–7.7)
NEUTROPHILS NFR BLD: 61.7 % (ref 38–73)
NITRITE UR QL STRIP: POSITIVE
NRBC BLD-RTO: 0 /100 WBC
PH UR STRIP: 6 [PH] (ref 5–8)
PHOSPHATE SERPL-MCNC: 2.4 MG/DL (ref 2.7–4.5)
PLATELET # BLD AUTO: 151 K/UL (ref 150–350)
PMV BLD AUTO: 10.2 FL (ref 9.2–12.9)
POC MOLECULAR INFLUENZA A AGN: NEGATIVE
POC MOLECULAR INFLUENZA B AGN: NEGATIVE
POCT GLUCOSE: 78 MG/DL (ref 70–110)
POTASSIUM SERPL-SCNC: 4.7 MMOL/L (ref 3.5–5.1)
PROT SERPL-MCNC: 6.6 G/DL (ref 6–8.4)
PROT UR QL STRIP: ABNORMAL
PROTHROMBIN TIME: 12.7 SEC (ref 9–12.5)
RBC # BLD AUTO: 4.05 M/UL (ref 4.6–6.2)
RBC #/AREA URNS HPF: 1 /HPF (ref 0–4)
SODIUM SERPL-SCNC: 139 MMOL/L (ref 136–145)
SP GR UR STRIP: 1.02 (ref 1–1.03)
URN SPEC COLLECT METH UR: ABNORMAL
UROBILINOGEN UR STRIP-ACNC: ABNORMAL EU/DL
WBC # BLD AUTO: 6.05 K/UL (ref 3.9–12.7)
WBC #/AREA URNS HPF: 11 /HPF (ref 0–5)
YEAST URNS QL MICRO: ABNORMAL

## 2020-02-11 PROCEDURE — 63600175 PHARM REV CODE 636 W HCPCS: Performed by: PHYSICIAN ASSISTANT

## 2020-02-11 PROCEDURE — 93005 ELECTROCARDIOGRAM TRACING: CPT

## 2020-02-11 PROCEDURE — 63600175 PHARM REV CODE 636 W HCPCS: Performed by: EMERGENCY MEDICINE

## 2020-02-11 PROCEDURE — 83605 ASSAY OF LACTIC ACID: CPT | Mod: 91

## 2020-02-11 PROCEDURE — 83605 ASSAY OF LACTIC ACID: CPT

## 2020-02-11 PROCEDURE — 87040 BLOOD CULTURE FOR BACTERIA: CPT

## 2020-02-11 PROCEDURE — G0378 HOSPITAL OBSERVATION PER HR: HCPCS

## 2020-02-11 PROCEDURE — 93010 EKG 12-LEAD: ICD-10-PCS | Mod: ,,, | Performed by: INTERNAL MEDICINE

## 2020-02-11 PROCEDURE — 83735 ASSAY OF MAGNESIUM: CPT

## 2020-02-11 PROCEDURE — 96374 THER/PROPH/DIAG INJ IV PUSH: CPT

## 2020-02-11 PROCEDURE — 99285 EMERGENCY DEPT VISIT HI MDM: CPT | Mod: 25

## 2020-02-11 PROCEDURE — 20000000 HC ICU ROOM

## 2020-02-11 PROCEDURE — 85610 PROTHROMBIN TIME: CPT

## 2020-02-11 PROCEDURE — 93010 ELECTROCARDIOGRAM REPORT: CPT | Mod: ,,, | Performed by: INTERNAL MEDICINE

## 2020-02-11 PROCEDURE — 81000 URINALYSIS NONAUTO W/SCOPE: CPT

## 2020-02-11 PROCEDURE — 82962 GLUCOSE BLOOD TEST: CPT

## 2020-02-11 PROCEDURE — 80053 COMPREHEN METABOLIC PANEL: CPT

## 2020-02-11 PROCEDURE — 85730 THROMBOPLASTIN TIME PARTIAL: CPT

## 2020-02-11 PROCEDURE — 85025 COMPLETE CBC W/AUTO DIFF WBC: CPT

## 2020-02-11 PROCEDURE — 84100 ASSAY OF PHOSPHORUS: CPT

## 2020-02-11 RX ORDER — SODIUM CHLORIDE 9 MG/ML
1000 INJECTION, SOLUTION INTRAVENOUS
Status: COMPLETED | OUTPATIENT
Start: 2020-02-11 | End: 2020-02-11

## 2020-02-11 RX ADMIN — SODIUM CHLORIDE 1000 ML: 0.9 INJECTION, SOLUTION INTRAVENOUS at 07:02

## 2020-02-11 RX ADMIN — CEFTRIAXONE 2 G: 2 INJECTION, SOLUTION INTRAVENOUS at 10:02

## 2020-02-11 RX ADMIN — SODIUM CHLORIDE 1770 ML: 0.9 INJECTION, SOLUTION INTRAVENOUS at 10:02

## 2020-02-11 NOTE — TELEPHONE ENCOUNTER
speak with the patient and spoke clair Villed[place] and she advised he started diarrhea since 5 am she has been eating the meals that are brought to him he does not want to eat anything and he is loosing weight, The stool is loose brown and terrible odor

## 2020-02-11 NOTE — TELEPHONE ENCOUNTER
----- Message from Julieta Buckley sent at 2/11/2020  2:25 PM CST -----  Contact: Carmita with [place] [phone]  Carmita called in regards to the patient has gotten really sick with diarrhea on today  please advise.

## 2020-02-11 NOTE — TELEPHONE ENCOUNTER
----- Message from Divina Gr sent at 2/11/2020  4:33 PM CST -----  Contact: Ronal [phone]  Patient's daughter would like to speak to the nurse in regards to the symptoms he is having and medical advise.

## 2020-02-12 ENCOUNTER — TELEPHONE (OUTPATIENT)
Dept: INTERNAL MEDICINE | Facility: CLINIC | Age: 85
End: 2020-02-12

## 2020-02-12 PROBLEM — L89.200: Status: ACTIVE | Noted: 2020-02-12

## 2020-02-12 PROBLEM — A41.9 SEPSIS DUE TO URINARY TRACT INFECTION: Status: ACTIVE | Noted: 2020-02-12

## 2020-02-12 PROBLEM — N39.0 SEPSIS DUE TO URINARY TRACT INFECTION: Status: ACTIVE | Noted: 2020-02-12

## 2020-02-12 LAB
ALBUMIN SERPL BCP-MCNC: 2.5 G/DL (ref 3.5–5.2)
ALP SERPL-CCNC: 61 U/L (ref 55–135)
ALT SERPL W/O P-5'-P-CCNC: 7 U/L (ref 10–44)
ANION GAP SERPL CALC-SCNC: 5 MMOL/L (ref 8–16)
AORTIC ROOT ANNULUS: 3.62 CM
AORTIC VALVE CUSP SEPERATION: 2.21 CM
AST SERPL-CCNC: 12 U/L (ref 10–40)
AV INDEX (PROSTH): 0.62
AV MEAN GRADIENT: 2 MMHG
AV PEAK GRADIENT: 4 MMHG
AV VALVE AREA: 2.38 CM2
AV VELOCITY RATIO: 0.68
BACTERIA #/AREA URNS HPF: ABNORMAL /HPF
BASOPHILS # BLD AUTO: 0.02 K/UL (ref 0–0.2)
BASOPHILS NFR BLD: 0.3 % (ref 0–1.9)
BILIRUB SERPL-MCNC: 1.2 MG/DL (ref 0.1–1)
BILIRUB UR QL STRIP: NEGATIVE
BSA FOR ECHO PROCEDURE: 1.57 M2
BUN SERPL-MCNC: 15 MG/DL (ref 8–23)
CALCIUM SERPL-MCNC: 8.4 MG/DL (ref 8.7–10.5)
CHLORIDE SERPL-SCNC: 108 MMOL/L (ref 95–110)
CHOLEST SERPL-MCNC: 126 MG/DL (ref 120–199)
CHOLEST/HDLC SERPL: 2.9 {RATIO} (ref 2–5)
CLARITY UR: CLEAR
CO2 SERPL-SCNC: 27 MMOL/L (ref 23–29)
COLOR UR: ABNORMAL
CREAT SERPL-MCNC: 0.6 MG/DL (ref 0.5–1.4)
CV ECHO LV RWT: 0.36 CM
DIFFERENTIAL METHOD: ABNORMAL
DOP CALC AO PEAK VEL: 0.96 M/S
DOP CALC AO VTI: 17.11 CM
DOP CALC LVOT AREA: 3.9 CM2
DOP CALC LVOT DIAMETER: 2.22 CM
DOP CALC LVOT PEAK VEL: 0.65 M/S
DOP CALC LVOT STROKE VOLUME: 40.78 CM3
DOP CALCLVOT PEAK VEL VTI: 10.54 CM
E WAVE DECELERATION TIME: 256.47 MSEC
E/E' RATIO: 5.45 M/S
ECHO LV POSTERIOR WALL: 0.81 CM (ref 0.6–1.1)
EOSINOPHIL # BLD AUTO: 0.2 K/UL (ref 0–0.5)
EOSINOPHIL NFR BLD: 2.8 % (ref 0–8)
ERYTHROCYTE [DISTWIDTH] IN BLOOD BY AUTOMATED COUNT: 16.8 % (ref 11.5–14.5)
EST. GFR  (AFRICAN AMERICAN): >60 ML/MIN/1.73 M^2
EST. GFR  (NON AFRICAN AMERICAN): >60 ML/MIN/1.73 M^2
FRACTIONAL SHORTENING: 20 % (ref 28–44)
GLUCOSE SERPL-MCNC: 76 MG/DL (ref 70–110)
GLUCOSE UR QL STRIP: NEGATIVE
HCT VFR BLD AUTO: 31.5 % (ref 40–54)
HDLC SERPL-MCNC: 43 MG/DL (ref 40–75)
HDLC SERPL: 34.1 % (ref 20–50)
HGB BLD-MCNC: 10.5 G/DL (ref 14–18)
HGB UR QL STRIP: ABNORMAL
IMM GRANULOCYTES # BLD AUTO: 0.01 K/UL (ref 0–0.04)
IMM GRANULOCYTES NFR BLD AUTO: 0.2 % (ref 0–0.5)
INTERVENTRICULAR SEPTUM: 0.81 CM (ref 0.6–1.1)
KETONES UR QL STRIP: ABNORMAL
LA MAJOR: 5.1 CM
LA MINOR: 4.92 CM
LA WIDTH: 3.87 CM
LACTATE SERPL-SCNC: 1.5 MMOL/L (ref 0.5–2.2)
LDLC SERPL CALC-MCNC: 71.6 MG/DL (ref 63–159)
LEFT ATRIUM SIZE: 2.72 CM
LEFT ATRIUM VOLUME INDEX: 28.2 ML/M2
LEFT ATRIUM VOLUME: 44.81 CM3
LEFT INTERNAL DIMENSION IN SYSTOLE: 3.6 CM (ref 2.1–4)
LEFT VENTRICLE DIASTOLIC VOLUME INDEX: 58.07 ML/M2
LEFT VENTRICLE DIASTOLIC VOLUME: 92.14 ML
LEFT VENTRICLE MASS INDEX: 73 G/M2
LEFT VENTRICLE SYSTOLIC VOLUME INDEX: 34.4 ML/M2
LEFT VENTRICLE SYSTOLIC VOLUME: 54.54 ML
LEFT VENTRICULAR INTERNAL DIMENSION IN DIASTOLE: 4.49 CM (ref 3.5–6)
LEFT VENTRICULAR MASS: 115.07 G
LEUKOCYTE ESTERASE UR QL STRIP: ABNORMAL
LV LATERAL E/E' RATIO: 5 M/S
LV SEPTAL E/E' RATIO: 6 M/S
LYMPHOCYTES # BLD AUTO: 2 K/UL (ref 1–4.8)
LYMPHOCYTES NFR BLD: 34 % (ref 18–48)
MAGNESIUM SERPL-MCNC: 1.5 MG/DL (ref 1.6–2.6)
MCH RBC QN AUTO: 27.6 PG (ref 27–31)
MCHC RBC AUTO-ENTMCNC: 33.3 G/DL (ref 32–36)
MCV RBC AUTO: 83 FL (ref 82–98)
MICROSCOPIC COMMENT: ABNORMAL
MONOCYTES # BLD AUTO: 0.6 K/UL (ref 0.3–1)
MONOCYTES NFR BLD: 10.5 % (ref 4–15)
MV PEAK E VEL: 0.6 M/S
NEUTROPHILS # BLD AUTO: 3 K/UL (ref 1.8–7.7)
NEUTROPHILS NFR BLD: 52.2 % (ref 38–73)
NITRITE UR QL STRIP: POSITIVE
NONHDLC SERPL-MCNC: 83 MG/DL
NRBC BLD-RTO: 0 /100 WBC
PH UR STRIP: 6 [PH] (ref 5–8)
PHOSPHATE SERPL-MCNC: 2.1 MG/DL (ref 2.7–4.5)
PISA TR MAX VEL: 1.7 M/S
PLATELET # BLD AUTO: 141 K/UL (ref 150–350)
PMV BLD AUTO: 10.5 FL (ref 9.2–12.9)
POCT GLUCOSE: 67 MG/DL (ref 70–110)
POCT GLUCOSE: 67 MG/DL (ref 70–110)
POCT GLUCOSE: 70 MG/DL (ref 70–110)
POCT GLUCOSE: 72 MG/DL (ref 70–110)
POTASSIUM SERPL-SCNC: 4.5 MMOL/L (ref 3.5–5.1)
PROT SERPL-MCNC: 6.1 G/DL (ref 6–8.4)
PROT UR QL STRIP: NEGATIVE
PV PEAK VELOCITY: 1.24 CM/S
RA MAJOR: 5.57 CM
RA PRESSURE: 3 MMHG
RA WIDTH: 5.12 CM
RBC # BLD AUTO: 3.8 M/UL (ref 4.6–6.2)
RBC #/AREA URNS HPF: 0 /HPF (ref 0–4)
SINUS: 3.14 CM
SODIUM SERPL-SCNC: 140 MMOL/L (ref 136–145)
SP GR UR STRIP: 1.02 (ref 1–1.03)
STJ: 2.53 CM
TDI LATERAL: 0.12 M/S
TDI SEPTAL: 0.1 M/S
TDI: 0.11 M/S
TR MAX PG: 12 MMHG
TRIGL SERPL-MCNC: 57 MG/DL (ref 30–150)
TV REST PULMONARY ARTERY PRESSURE: 15 MMHG
URN SPEC COLLECT METH UR: ABNORMAL
UROBILINOGEN UR STRIP-ACNC: ABNORMAL EU/DL
WBC # BLD AUTO: 5.73 K/UL (ref 3.9–12.7)
WBC #/AREA URNS HPF: 21 /HPF (ref 0–5)
YEAST URNS QL MICRO: ABNORMAL

## 2020-02-12 PROCEDURE — 83735 ASSAY OF MAGNESIUM: CPT

## 2020-02-12 PROCEDURE — 84100 ASSAY OF PHOSPHORUS: CPT

## 2020-02-12 PROCEDURE — 83605 ASSAY OF LACTIC ACID: CPT

## 2020-02-12 PROCEDURE — 80053 COMPREHEN METABOLIC PANEL: CPT

## 2020-02-12 PROCEDURE — 36415 COLL VENOUS BLD VENIPUNCTURE: CPT

## 2020-02-12 PROCEDURE — 11000001 HC ACUTE MED/SURG PRIVATE ROOM

## 2020-02-12 PROCEDURE — 25000003 PHARM REV CODE 250: Performed by: NURSE PRACTITIONER

## 2020-02-12 PROCEDURE — 63600175 PHARM REV CODE 636 W HCPCS: Performed by: HOSPITALIST

## 2020-02-12 PROCEDURE — 80061 LIPID PANEL: CPT

## 2020-02-12 PROCEDURE — 85025 COMPLETE CBC W/AUTO DIFF WBC: CPT

## 2020-02-12 PROCEDURE — 97161 PT EVAL LOW COMPLEX 20 MIN: CPT

## 2020-02-12 PROCEDURE — 99223 1ST HOSP IP/OBS HIGH 75: CPT | Mod: AI,,, | Performed by: HOSPITALIST

## 2020-02-12 PROCEDURE — 83036 HEMOGLOBIN GLYCOSYLATED A1C: CPT

## 2020-02-12 PROCEDURE — 94761 N-INVAS EAR/PLS OXIMETRY MLT: CPT

## 2020-02-12 PROCEDURE — 97110 THERAPEUTIC EXERCISES: CPT

## 2020-02-12 PROCEDURE — 99223 PR INITIAL HOSPITAL CARE,LEVL III: ICD-10-PCS | Mod: AI,,, | Performed by: HOSPITALIST

## 2020-02-12 PROCEDURE — 81000 URINALYSIS NONAUTO W/SCOPE: CPT

## 2020-02-12 PROCEDURE — 87086 URINE CULTURE/COLONY COUNT: CPT

## 2020-02-12 PROCEDURE — 63600175 PHARM REV CODE 636 W HCPCS: Performed by: NURSE PRACTITIONER

## 2020-02-12 PROCEDURE — 87088 URINE BACTERIA CULTURE: CPT

## 2020-02-12 RX ORDER — IBUPROFEN 200 MG
16 TABLET ORAL
Status: DISCONTINUED | OUTPATIENT
Start: 2020-02-12 | End: 2020-02-18 | Stop reason: HOSPADM

## 2020-02-12 RX ORDER — ONDANSETRON 8 MG/1
8 TABLET, ORALLY DISINTEGRATING ORAL EVERY 8 HOURS PRN
Status: DISCONTINUED | OUTPATIENT
Start: 2020-02-12 | End: 2020-02-18 | Stop reason: HOSPADM

## 2020-02-12 RX ORDER — INSULIN ASPART 100 [IU]/ML
0-5 INJECTION, SOLUTION INTRAVENOUS; SUBCUTANEOUS
Status: DISCONTINUED | OUTPATIENT
Start: 2020-02-12 | End: 2020-02-18 | Stop reason: HOSPADM

## 2020-02-12 RX ORDER — IBUPROFEN 200 MG
24 TABLET ORAL
Status: DISCONTINUED | OUTPATIENT
Start: 2020-02-12 | End: 2020-02-18 | Stop reason: HOSPADM

## 2020-02-12 RX ORDER — GLUCAGON 1 MG
1 KIT INJECTION
Status: DISCONTINUED | OUTPATIENT
Start: 2020-02-12 | End: 2020-02-18 | Stop reason: HOSPADM

## 2020-02-12 RX ORDER — LANOLIN ALCOHOL/MO/W.PET/CERES
1000 CREAM (GRAM) TOPICAL DAILY
Status: DISCONTINUED | OUTPATIENT
Start: 2020-02-12 | End: 2020-02-18 | Stop reason: HOSPADM

## 2020-02-12 RX ORDER — SODIUM CHLORIDE, SODIUM LACTATE, POTASSIUM CHLORIDE, CALCIUM CHLORIDE 600; 310; 30; 20 MG/100ML; MG/100ML; MG/100ML; MG/100ML
INJECTION, SOLUTION INTRAVENOUS CONTINUOUS
Status: DISCONTINUED | OUTPATIENT
Start: 2020-02-12 | End: 2020-02-13

## 2020-02-12 RX ORDER — SODIUM CHLORIDE 0.9 % (FLUSH) 0.9 %
10 SYRINGE (ML) INJECTION
Status: DISCONTINUED | OUTPATIENT
Start: 2020-02-12 | End: 2020-02-18 | Stop reason: HOSPADM

## 2020-02-12 RX ORDER — FOLIC ACID 1 MG/1
1 TABLET ORAL DAILY
Status: DISCONTINUED | OUTPATIENT
Start: 2020-02-12 | End: 2020-02-18 | Stop reason: HOSPADM

## 2020-02-12 RX ORDER — ACETAMINOPHEN 325 MG/1
650 TABLET ORAL EVERY 4 HOURS PRN
Status: DISCONTINUED | OUTPATIENT
Start: 2020-02-12 | End: 2020-02-18 | Stop reason: HOSPADM

## 2020-02-12 RX ADMIN — CEFTRIAXONE 1 G: 1 INJECTION, SOLUTION INTRAVENOUS at 10:02

## 2020-02-12 RX ADMIN — FOLIC ACID 1 MG: 1 TABLET ORAL at 09:02

## 2020-02-12 RX ADMIN — ONDANSETRON 8 MG: 8 TABLET, ORALLY DISINTEGRATING ORAL at 05:02

## 2020-02-12 RX ADMIN — CYANOCOBALAMIN TAB 1000 MCG 1000 MCG: 1000 TAB at 09:02

## 2020-02-12 RX ADMIN — SODIUM CHLORIDE, SODIUM LACTATE, POTASSIUM CHLORIDE, AND CALCIUM CHLORIDE: .6; .31; .03; .02 INJECTION, SOLUTION INTRAVENOUS at 10:02

## 2020-02-12 RX ADMIN — SODIUM CHLORIDE, SODIUM LACTATE, POTASSIUM CHLORIDE, AND CALCIUM CHLORIDE: .6; .31; .03; .02 INJECTION, SOLUTION INTRAVENOUS at 06:02

## 2020-02-12 NOTE — H&P
[facility] Hospital Medicine  History & Physical    Patient Name: Chris Asencio  MRN: [id number]  Admission Date: 2/11/2020  Attending Physician: Darrel Stein MD   Primary Care Provider: Arleen Garcia MD         Patient information was obtained from patient, past medical records and ER records.     Subjective:     Principal Problem:Hypotension    Chief Complaint:   Chief Complaint   Patient presents with    Diarrhea     diarrhea since 5 am. 79/48 with EMS. 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  Patient's daughter is his primary caregiver and she reports that patient was in his usual state of health but at 5:00 a.m. this morning he started with very loose watery stools.  She reports he has had a bowel movement approximately every 0.5 hr.  There has been no obvious blood but patient does report that there has been unusual smell with stool.  There is no known sick contacts.  Patient has not tolerated any food or fluids today but this is because of poor appetite.  Patient has no vomiting.  There is no cough or cold symptoms or fever.  Patient's daughter reports home health nurse came to evaluate the patient and determined that his blood sugar was low so she gave the patient a Coke to drink.  The patient's primary provider was consulted via phone by the home health nurse and the patient was instructed to come straight to the emergency department via EMS.  EMS blood sugar was noted to be 125 on arrival patient had hypotension with 70 systolic and received 1 L of lactated Ringer's in route.  Patient adamantly denies any abdominal pain, chest pain, shortness of breath, bleeding, headache, dizziness or confusion.  Patient's daughter does cooperate no complaints and normal affect.  Daughter is present throughout entire interview and exam.    Past Medical History:   Diagnosis Date    *Atrial fibrillation     Diabetes mellitus type II     Glaucoma     Hyperlipidemia     Hypertension     Kyphosis        Past Surgical History:   Procedure Laterality Date    CHOLECYSTECTOMY         Review of patient's allergies indicates:  No Known Allergies    No current facility-administered medications on file prior to encounter.      Current Outpatient Medications on File Prior to Encounter   Medication Sig    cyanocobalamin (VITAMIN B-12) 1000 MCG tablet Take 1 tablet (1,000 mcg total) by mouth once daily.    econazole nitrate 1 % cream AAA bid to feet    folic acid (FOLVITE) 1 MG tablet Take 1 tablet (1 mg total) by mouth once daily.    HYDROcodone-acetaminophen (NORCO) 7.5-325 mg per tablet Take 1 tablet by mouth every 6 (six) hours as needed for Pain.    lancets Misc 1 Units by Misc.(Non-Drug; Combo Route) route once daily.    metoprolol tartrate (LOPRESSOR) 25 MG tablet Take 0.5 tablets (12.5 mg total) by mouth 2 (two) times daily.    warfarin (COUMADIN) 1 MG tablet TAKE 1 TABLET BY MOUTH EVERY DAY    warfarin (COUMADIN) 5 MG tablet take 1 tablet by mouth once daily    blood sugar diagnostic Strp 1 strip by Misc.(Non-Drug; Combo Route) route once daily.    blood-glucose meter kit Use as instructed     Family History     None        Tobacco Use    Smoking status: Former Smoker     Start date: 4/16/1984    Smokeless tobacco: Former User   Substance and Sexual Activity    Alcohol use: No    Drug use: No    Sexual activity: Not Currently     Partners: Female     Review of Systems   Constitutional: Positive for fatigue. Negative for activity change, appetite change and fever.   HENT: Negative for congestion, ear pain and postnasal drip.    Eyes: Negative for discharge.   Respiratory: Negative for apnea, shortness of breath and wheezing.    Cardiovascular: Negative for chest pain and leg  swelling.   Gastrointestinal: Positive for abdominal distention, abdominal pain and diarrhea. Negative for nausea and vomiting.   Endocrine: Negative for polydipsia, polyphagia and polyuria.   Genitourinary: Negative for difficulty urinating, flank pain, frequency, hematuria and urgency.   Musculoskeletal: Positive for myalgias. Negative for arthralgias and joint swelling.   Skin: Negative for pallor and rash.   Allergic/Immunologic: Negative for environmental allergies and food allergies.   Neurological: Positive for weakness. Negative for dizziness, speech difficulty, light-headedness and headaches.   Hematological: Does not bruise/bleed easily.   Psychiatric/Behavioral: Negative for agitation.     Objective:     Vital Signs (Most Recent):  Temp: 97.8 °F (36.6 °C) (02/11/20 1810)  Pulse: 75 (02/11/20 2243)  Resp: 15 (02/11/20 2243)  BP: (!) 89/56 (02/11/20 2244)  SpO2: 98 % (02/11/20 2242) Vital Signs (24h Range):  Temp:  [97.8 °F (36.6 °C)] 97.8 °F (36.6 °C)  Pulse:  [67-75] 75  Resp:  [15-18] 15  SpO2:  [97 %-100 %] 98 %  BP: ()/(42-59) 89/56     Weight: 59 kg (130 lb)  Body mass index is 19.2 kg/m².    Physical Exam   Constitutional: He appears well-developed. He appears lethargic.   HENT:   Head: Normocephalic.   Eyes: Conjunctivae are normal.   Neck: Normal range of motion. Neck supple.   Cardiovascular: Normal rate, normal heart sounds and intact distal pulses. An irregularly irregular rhythm present.   Pulses:       Radial pulses are 1+ on the right side, and 1+ on the left side.   Pulmonary/Chest: Effort normal. He has decreased breath sounds in the right lower field and the left lower field.   Abdominal: Soft. He exhibits no distension. Bowel sounds are increased. There is generalized tenderness.   Musculoskeletal: Normal range of motion.   Neurological: He appears lethargic. GCS eye subscore is 4. GCS verbal subscore is 5. GCS motor subscore is 5.   Skin: Skin is warm and dry.   Psychiatric: He has  a normal mood and affect. His speech is normal and behavior is normal.           Significant Labs:   CBC:   Recent Labs   Lab 02/11/20 1908   WBC 6.05   HGB 11.3*   HCT 33.5*        CMP:   Recent Labs   Lab 02/11/20 1908      K 4.7      CO2 27   GLU 72   BUN 18   CREATININE 0.7   CALCIUM 8.9   PROT 6.6   ALBUMIN 2.8*   BILITOT 1.9*   ALKPHOS 66   AST 13   ALT 7*   ANIONGAP 7*   EGFRNONAA >60       Significant Imaging: I have reviewed all pertinent imaging results/findings within the past 24 hours.    Assessment/Plan:     * Hypotension  Patient complaining of diarrhea all day with no appetite.  Feel this is the cause of the hypotension    Hold BP meds  IVF        Urinary tract infection without hematuria  U/A positive for nitrites, leukocytes. Afebrile, no elevation in WBC, lactic 1    Rocephin in ER  Blood cultures pending  Urine culture pending        Type II diabetes mellitus with complication  BG- 72, hypoglycemic on presentation. Patient reports minimal dietary intake    A1c pending  Low dose SSI  BG AC/HS      Atrial fibrillation  Continue Coumadin        VTE Risk Mitigation (From admission, onward)         Ordered     warfarin (COUMADIN) tablet 6 mg  Daily      02/12/20 0009     Place NOVA hose  Until discontinued      02/12/20 0009     Place sequential compression device  Until discontinued      02/12/20 0009     IP VTE HIGH RISK PATIENT  Once      02/12/20 0009     Reason for No Pharmacological VTE Prophylaxis  Once     Question:  Reasons:  Answer:  Already adequately anticoagulated on oral Anticoagulants    02/12/20 0009                   Yaya Mckee NP  Department of Hospital Medicine   [facility]

## 2020-02-12 NOTE — ASSESSMENT & PLAN NOTE
Patient complaining of diarrhea all day with no appetite.  Feel this is the cause of the hypotension    Hold BP meds  IVF

## 2020-02-12 NOTE — SUBJECTIVE & OBJECTIVE
Past Medical History:   Diagnosis Date    *Atrial fibrillation     Diabetes mellitus type II     Glaucoma     Hyperlipidemia     Hypertension     Kyphosis        Past Surgical History:   Procedure Laterality Date    CHOLECYSTECTOMY         Review of patient's allergies indicates:  No Known Allergies    No current facility-administered medications on file prior to encounter.      Current Outpatient Medications on File Prior to Encounter   Medication Sig    cyanocobalamin (VITAMIN B-12) 1000 MCG tablet Take 1 tablet (1,000 mcg total) by mouth once daily.    econazole nitrate 1 % cream AAA bid to feet    folic acid (FOLVITE) 1 MG tablet Take 1 tablet (1 mg total) by mouth once daily.    HYDROcodone-acetaminophen (NORCO) 7.5-325 mg per tablet Take 1 tablet by mouth every 6 (six) hours as needed for Pain.    lancets Misc 1 Units by Misc.(Non-Drug; Combo Route) route once daily.    metoprolol tartrate (LOPRESSOR) 25 MG tablet Take 0.5 tablets (12.5 mg total) by mouth 2 (two) times daily.    warfarin (COUMADIN) 1 MG tablet TAKE 1 TABLET BY MOUTH EVERY DAY    warfarin (COUMADIN) 5 MG tablet take 1 tablet by mouth once daily    blood sugar diagnostic Strp 1 strip by Misc.(Non-Drug; Combo Route) route once daily.    blood-glucose meter kit Use as instructed     Family History     None        Tobacco Use    Smoking status: Former Smoker     Start date: 4/16/1984    Smokeless tobacco: Former User   Substance and Sexual Activity    Alcohol use: No    Drug use: No    Sexual activity: Not Currently     Partners: Female     Review of Systems   Constitutional: Positive for fatigue. Negative for activity change, appetite change and fever.   HENT: Negative for congestion, ear pain and postnasal drip.    Eyes: Negative for discharge.   Respiratory: Negative for apnea, shortness of breath and wheezing.    Cardiovascular: Negative for chest pain and leg swelling.   Gastrointestinal: Positive for abdominal  distention, abdominal pain and diarrhea. Negative for nausea and vomiting.   Endocrine: Negative for polydipsia, polyphagia and polyuria.   Genitourinary: Negative for difficulty urinating, flank pain, frequency, hematuria and urgency.   Musculoskeletal: Positive for myalgias. Negative for arthralgias and joint swelling.   Skin: Negative for pallor and rash.   Allergic/Immunologic: Negative for environmental allergies and food allergies.   Neurological: Positive for weakness. Negative for dizziness, speech difficulty, light-headedness and headaches.   Hematological: Does not bruise/bleed easily.   Psychiatric/Behavioral: Negative for agitation.     Objective:     Vital Signs (Most Recent):  Temp: 97.8 °F (36.6 °C) (02/11/20 1810)  Pulse: 75 (02/11/20 2243)  Resp: 15 (02/11/20 2243)  BP: (!) 89/56 (02/11/20 2244)  SpO2: 98 % (02/11/20 2242) Vital Signs (24h Range):  Temp:  [97.8 °F (36.6 °C)] 97.8 °F (36.6 °C)  Pulse:  [67-75] 75  Resp:  [15-18] 15  SpO2:  [97 %-100 %] 98 %  BP: ()/(42-59) 89/56     Weight: 59 kg (130 lb)  Body mass index is 19.2 kg/m².    Physical Exam   Constitutional: He appears well-developed. He appears lethargic.   HENT:   Head: Normocephalic.   Eyes: Conjunctivae are normal.   Neck: Normal range of motion. Neck supple.   Cardiovascular: Normal rate, normal heart sounds and intact distal pulses. An irregularly irregular rhythm present.   Pulses:       Radial pulses are 1+ on the right side, and 1+ on the left side.   Pulmonary/Chest: Effort normal. He has decreased breath sounds in the right lower field and the left lower field.   Abdominal: Soft. He exhibits no distension. Bowel sounds are increased. There is generalized tenderness.   Musculoskeletal: Normal range of motion.   Neurological: He appears lethargic. GCS eye subscore is 4. GCS verbal subscore is 5. GCS motor subscore is 5.   Skin: Skin is warm and dry.   Psychiatric: He has a normal mood and affect. His speech is normal and  behavior is normal.           Significant Labs:   CBC:   Recent Labs   Lab 02/11/20 1908   WBC 6.05   HGB 11.3*   HCT 33.5*        CMP:   Recent Labs   Lab 02/11/20  1908      K 4.7      CO2 27   GLU 72   BUN 18   CREATININE 0.7   CALCIUM 8.9   PROT 6.6   ALBUMIN 2.8*   BILITOT 1.9*   ALKPHOS 66   AST 13   ALT 7*   ANIONGAP 7*   EGFRNONAA >60       Significant Imaging: I have reviewed all pertinent imaging results/findings within the past 24 hours.

## 2020-02-12 NOTE — PT/OT/SLP EVAL
Physical Therapy Evaluation    Patient Name:  Chris Asencio   MRN:  [id number]    Recommendations:     Discharge Recommendations:  home   Discharge Equipment Recommendations: (TBD; jam may need top be considered)   Barriers to discharge: None    Assessment:     Chris Asencio is a 88 y.o. male admitted with a medical diagnosis of Sepsis due to urinary tract infection.  He presents with the following impairments/functional limitations:  weakness, impaired endurance, impaired self care skills, impaired functional mobilty, gait instability, impaired balance, decreased lower extremity function, decreased upper extremity function, decreased coordination, decreased ROM, impaired muscle length, impaired skin, impaired joint extensibility. Pt continues with functional mobility deficits and should benefit from continuing current PT POC to maximize I and safety decrease risk of further decline of injury.    Rehab Prognosis: Fair; patient would benefit from acute skilled PT services to address these deficits and reach maximum level of function.    Recent Surgery: * No surgery found *      Plan:     During this hospitalization, patient to be seen 5 x/week to address the identified rehab impairments via therapeutic activities, therapeutic exercises, neuromuscular re-education and progress toward the following goals:    · Plan of Care Expires:  03/12/20    Subjective     Chief Complaint: pt without c/o  Patient/Family Comments/goals: none  Pain/Comfort:  · Pain Rating 1: 0/10    Patients cultural, spiritual, Catholic conflicts given the current situation: no    Living Environment:  Pt lives with his daughter and grandsons in a single story house with 1 ROSALEE at entry  Prior to admission, patients level of function was largely wc bound although pt states he spends most of time in bed. Transfers to w/c with family assist  dtr not present.  Per chart review 3 weeks ago pt required min a for supine<>sit and sit <.stand/squat.    Equipment used at home: wheelchair, hospital bed.  DME owned (not currently used): rolling walker and single point cane.  Upon discharge, patient will have assistance from family-daughter and grandsons. .    Communicated with An prior to session.  Patient found supine with Condom Catheter, peripheral IV(ICU monitoring)  upon PT entry to room.    General Precautions: Standard, fall   Orthopedic Precautions:N/A   Braces: N/A     Exams:  · Cognitive Exam:  Patient is oriented to Person  · Gross Motor Coordination:  impaired in BLE  · Postural Exam:  Patient presented with the following abnormalities:    · -       Rounded shoulders  · -       Forward head  · -       Posterior pelvic tilt  · -       Lateral weight shift of hips  · -       Kyphosis  · -       Scoliosis  · Sensation: decreased in B feet as compare to legs   · Skin Integrity/Edema:      · -       Skin integrity: extreme dryness to legs with almost leather appearance, sacral wound bandaged  · B UE grossly WFL  · RLE ROM: Deficits: knee flex contracture ~20 degrees , ankles to neutral   · RLE Strength: Deficits: grossly 3-/s in available range   · LLE ROM: Deficits: knee flexion contracture ~30 degrees.     · LLE Strength: Deficits: same as R LE  Functional Mobility:   · Bed Mobility:     · Supine to Sit: maximal assistance  · Sit to Supine: maximal assistance  · Transfers:     · Sit to Stand:  deferred by pt   · Gait: non ambulatory  · Balance: poor      Therapeutic Activities and Exercises:  Pt initially resistant agreeable to EOB. Bed mobility supine<>sit with max A, static sitting balance poor. Pt deferred attempts at standing sat EOB x 3 min with mod-> max A.  Return to supine with max A. Pt performed AA there ex B LE in all available planes and ranges. Pt positioned for pressure relief with heels elevated.    AM-PAC 6 CLICK MOBILITY  Total Score:11     Patient left supine with all lines intact and call button in reach.    GOALS:   Multidisciplinary  Problems     Physical Therapy Goals        Problem: Physical Therapy Goal    Goal Priority Disciplines Outcome Goal Variances Interventions   Physical Therapy Goal     PT, PT/OT      Description:  Goals to be met by: 2020    Patient will increase functional independence with mobility by performin. Sit<>stand/squat with min A with no AD.  2. Supine<>sit with min A  3. Squat pivot EOB <>BSC with mod A                    History:     Past Medical History:   Diagnosis Date    *Atrial fibrillation     Diabetes mellitus type II     Glaucoma     Hyperlipidemia     Hypertension     Kyphosis        Past Surgical History:   Procedure Laterality Date    CHOLECYSTECTOMY         Time Tracking:     PT Received On: 20  PT Start Time: 1251     PT Stop Time: 1315  PT Total Time (min): 24 min     Billable Minutes: Evaluation 13 and Therapeutic Exercise 11      Vivek Schuster, PT  2020

## 2020-02-12 NOTE — PLAN OF CARE
Pt not able to sign, daughter not present. CM to ask daughter for permission to sign when contacted.     02/12/20 7973   Medicare Message   Important Message from Medicare regarding Discharge Appeal Rights Other (comments)  (Pt is blind, unable to sign form, daughter not present.)   Date IMM was signed 02/12/20   Time IMM was signed 0830

## 2020-02-12 NOTE — PLAN OF CARE
Problem: Wound  Goal: Optimal Wound Healing  Outcome: Ongoing, Progressing     Problem: Diabetes Comorbidity  Goal: Blood Glucose Level Within Desired Range  Outcome: Ongoing, Progressing     Problem: Infection  Goal: Infection Symptom Resolution  Outcome: Ongoing, Progressing     Problem: Skin Injury Risk Increased  Goal: Skin Health and Integrity  Outcome: Ongoing, Progressing     Problem: UTI (Urinary Tract Infection)  Goal: Improved Infection Symptoms  Outcome: Ongoing, Progressing  Intervention: Prevent and Manage Infection Progression  Flowsheets (Taken 2/12/2020 0451)  Fever Reduction/Comfort Measures: lightweight bedding; lightweight clothing  Infection Management: aseptic technique maintained  Intervention: Facilitate Optimal Urinary Elimination  Flowsheets (Taken 2/12/2020 0456)  Urinary Elimination Promotion: bladder volume assessed by ultrasound; frequent voiding encouraged; toileting offered; voiding relaxation promoted; JOSE JUAN Berry notified of low UO; BS shows around 300 cc- no new orders.

## 2020-02-12 NOTE — HPI
"Per Yaya Mckee, JOSE JUAN:    "The patient is a 88-year-old male history of AFib on Coumadin, hypertension, hyperlipidemia, diabetes with bedsore to the left hip who presents via EMS with complaints of multiple episodes of diarrhea and low blood sugar.  Patient's daughter is his primary caregiver and she reports that patient was in his usual state of health but at 5:00 a.m. this morning he started with very loose watery stools.  She reports he has had a bowel movement approximately every 0.5 hr.  There has been no obvious blood but patient does report that there has been unusual smell with stool.  There is no known sick contacts.  Patient has not tolerated any food or fluids today but this is because of poor appetite.  Patient has no vomiting.  There is no cough or cold symptoms or fever.  Patient's daughter reports home health nurse came to evaluate the patient and determined that his blood sugar was low so she gave the patient a Coke to drink.  The patient's primary provider was consulted via phone by the home health nurse and the patient was instructed to come straight to the emergency department via EMS.  EMS blood sugar was noted to be 125 on arrival patient had hypotension with 70 systolic and received 1 L of lactated Ringer's in route.  Patient adamantly denies any abdominal pain, chest pain, shortness of breath, bleeding, headache, dizziness or confusion.  Patient's daughter does cooperate no complaints and normal affect. Daughter is present throughout entire interview and exam."  "

## 2020-02-12 NOTE — CONSULTS
Ken with pt waiting on return call.   Wound Care Consult for Unstageable Pressure Injury to left lateral hip. Assisted by RNAn. Pictures taken. Patient is an 88 year old gentleman with contractures and kyphosis. He is taken care of by his daughter. Patient is very thin, with large joints, very dry skin. Pleasant and responds when spoken to.    Left Lateral Hip - Unstageable Pressure Injury covered in a small amount of black, mostly tan to yellow slough that is adherent to the wound base. Wound edges with pink healing tissue as noted by the islets of natural coolring of patient's skin. Leonarda-wound area is intact. Applied MediHoney to wound base and covered with a silicone bordered foam dressing. To be changed daily.    During last admission patient had an Unstageable Pressure Injury to the sacral spine which has healed with only pink healed tissue noted.    Bilateral feet and toes - Patient's skin is very dry, wrinkled, very thin. Most of the toes have over grown toe nails that are digging into his skin at different points. Advanced fungal infection noted to toes. Noted on the right 2d toe is a moist to dry scab . Etiology unclear. Feet have a strong odor due to lack of hygiene. Patient would benefit from a through cleaning of his feet and toes. Patient would needs Podiatry to trim his toe nails in order to prevent open wounds to his feet.    Recommend: Left lateral Hip -Unstageable Pressure Injury - Clean with wound cleanser, pat dry. Apply Medihoney to wound base, cover with a silicone bordered foam dressing. Change daily.    Recommend: Right 2d Toe - Clean with wound cleanser, pat dry. Apply a small amount of MediHoney to wound base, cover with silicone bordered dressing. Change daily.    Have spoken with Dr. Stein in regards to a Podiatry consult for patients toe nails.    Clair Mcfarlane RN, Wound and [facility]

## 2020-02-12 NOTE — PLAN OF CARE
CM attempted to complete initial discharge planning asssessment. Pt states his daughter can answer all the questions.    CM spoke with pt's daughter, Ronal, to complete assessment. Daughter is home with pt at all times, currently pt has [facility],arranged by PHN. Family would like to continue with same.    Daughter confirmed his PCP is correct in Epic, pharmacy fo choice is [facility] on [address].    Pt does not take coumadin and is not on HD. Daughter has some concerns about pt's hospital bed, she states he has had it around a year and the mattress is not good. CM to talk with [facility] to inquire about bed.    Pt will need  van transportation home when discharged home.    CM to follow for plans and arrangemetns.     02/12/20 1628   Discharge Assessment   Assessment Type Discharge Planning Assessment   Assessment information obtained from? Caregiver

## 2020-02-12 NOTE — ASSESSMENT & PLAN NOTE
BG- 72, hypoglycemic on presentation. Patient reports minimal dietary intake    A1c pending  Low dose SSI  BG AC/HS

## 2020-02-12 NOTE — ED PROVIDER NOTES
Encounter Date: 2/11/2020       History     Chief Complaint   Patient presents with    Diarrhea     diarrhea since 5 am. 79/48 with EMS. received 1L of LR     Patient is a 88-year-old male history of AFib on Coumadin, hypertension, hyperlipidemia, diabetes with bedsore to the left hip who presents via EMS with complaints of multiple episodes of diarrhea and low blood sugar.  Patient's daughter is his primary caregiver and she reports that patient was in his usual state of health but at 5:00 a.m. this morning he started with very loose watery stools.  She reports he has had a bowel movement approximately every 0.5 hr.  There has been no obvious blood but patient does report that there has been unusual smell did stool.  There is no known sick contacts.  Patient has not tolerated any food or fluids today but this is because of poor appetite.  Patient has no vomiting.  There is no cough or cold symptoms or fever.  Patient's daughter reports home health nurse came to evaluate the patient and determined that his blood sugar was low so she gave the patient a Coke to drink.  The patient's primary provider was consulted via phone by the home health nurse and the patient was instructed to come straight to the emergency department via EMS.  EMS blood sugar was noted to be 125 on arrival patient had hypotension with 70 systolic and received 1 L of lactated Ringer's in route.  Patient adamantly denies any abdominal pain, chest pain, shortness of breath, bleeding, headache, dizziness or confusion.  Patient's daughter does cooperate no complaints and normal affect. Daughter is present throughout entire interview and exam.        Review of patient's allergies indicates:  No Known Allergies  Past Medical History:   Diagnosis Date    *Atrial fibrillation     Diabetes mellitus type II     Glaucoma     Hyperlipidemia     Hypertension     Kyphosis      Past Surgical History:   Procedure Laterality Date    CHOLECYSTECTOMY    "    Family History   Problem Relation Age of Onset    Melanoma Neg Hx      Social History     Tobacco Use    Smoking status: Former Smoker     Start date: 4/16/1984    Smokeless tobacco: Former User   Substance Use Topics    Alcohol use: No    Drug use: No     Review of Systems   Constitutional: Negative for fever.   HENT: Negative for sore throat.    Respiratory: Negative for shortness of breath.    Cardiovascular: Negative for chest pain.   Gastrointestinal: Positive for diarrhea. Negative for nausea.   Endocrine:        Low blood sugar   Genitourinary: Negative for dysuria.   Musculoskeletal: Negative for back pain.   Skin: Negative for rash.   Neurological: Negative for weakness.   Hematological: Does not bruise/bleed easily.       Physical Exam     Initial Vitals [02/11/20 1810]   BP Pulse Resp Temp SpO2   (!) 105/58 71 18 97.8 °F (36.6 °C) 98 %      MAP       --         Physical Exam    Nursing note and vitals reviewed.  Constitutional: He appears well-developed and well-nourished. He is not diaphoretic. No distress.   Elderly chronically ill-appearing male in no acute distress or obvious pain resting comfortably in reclined position on exam stretcher.  He is contracted and kyphotic which is his baseline.  He is able to tell me his name and his daughter's name but refuses to answer any further questions stating "she knows all the answers" referring to his daughter.   HENT:   Head: Normocephalic and atraumatic.   Eyes: Conjunctivae and EOM are normal. Pupils are equal, round, and reactive to light. Right eye exhibits no discharge. Left eye exhibits no discharge. No scleral icterus.   Neck: Normal range of motion.   Cardiovascular: Normal heart sounds and intact distal pulses. Exam reveals no gallop and no friction rub.    No murmur heard.  Pulmonary/Chest: Breath sounds normal. He has no wheezes. He has no rhonchi. He has no rales.   Clear lungs auscultation bilaterally   Abdominal: Soft. Bowel sounds are " normal. There is no tenderness. There is no rebound and no guarding.   Benign abdomen   Musculoskeletal: Normal range of motion. He exhibits no edema or tenderness.   Lymphadenopathy:     He has no cervical adenopathy.   Neurological: He is alert and oriented to person, place, and time. He has normal strength. No sensory deficit. GCS score is 15. GCS eye subscore is 4. GCS verbal subscore is 5. GCS motor subscore is 6.   Skin: Skin is warm. Capillary refill takes less than 2 seconds. No rash and no abscess noted. No erythema.   Stage II decubitus to left hip covered with dry sterile dressing no bleeding underlying induration or tenderness.    Yellow brown fecal material noted all over patient's rectum in gluteal region with no evidence of bleeding.  Stool consistency is not watery.   Psychiatric: He has a normal mood and affect. His behavior is normal. Thought content normal.         ED Course   Procedures  Labs Reviewed   CBC W/ AUTO DIFFERENTIAL   COMPREHENSIVE METABOLIC PANEL       2/11/2020     Labs Reviewed   CBC W/ AUTO DIFFERENTIAL - Abnormal; Notable for the following components:       Result Value    RBC 4.05 (*)     Hemoglobin 11.3 (*)     Hematocrit 33.5 (*)     RDW 17.1 (*)     All other components within normal limits   COMPREHENSIVE METABOLIC PANEL - Abnormal; Notable for the following components:    Albumin 2.8 (*)     Total Bilirubin 1.9 (*)     ALT 7 (*)     Anion Gap 7 (*)     All other components within normal limits   PROTIME-INR - Abnormal; Notable for the following components:    Prothrombin Time 12.7 (*)     All other components within normal limits   URINALYSIS - Abnormal; Notable for the following components:    Protein, UA Trace (*)     Ketones, UA 1+ (*)     Bilirubin (UA) 1+ (*)     Occult Blood UA Trace (*)     Nitrite, UA Positive (*)     Urobilinogen, UA 2.0-3.0 (*)     Leukocytes, UA 1+ (*)     All other components within normal limits   URINALYSIS MICROSCOPIC - Abnormal; Notable for  the following components:    WBC, UA 11 (*)     Bacteria Moderate (*)     Yeast, UA Few (*)     All other components within normal limits   PHOSPHORUS - Abnormal; Notable for the following components:    Phosphorus 2.4 (*)     All other components within normal limits   CULTURE, BLOOD   CULTURE, BLOOD   INFLUENZA A & B BY MOLECULAR   CLOSTRIDIUM DIFFICILE   APTT   LACTIC ACID, PLASMA   MAGNESIUM   LACTIC ACID, PLASMA   URINALYSIS, REFLEX TO URINE CULTURE   POCT INFLUENZA A/B MOLECULAR   POCT GLUCOSE     Imaging Results          X-Ray Chest AP Portable (Final result)  Result time 02/11/20 22:27:53    Final result by Rosa Isela Gonsales MD (02/11/20 22:27:53)                 Impression:      As above.      Electronically signed by: Rosa Isela Gonsales MD  Date:    02/11/2020  Time:    22:27             Narrative:    EXAMINATION:  XR CHEST AP PORTABLE    CLINICAL HISTORY:  Sepsis;    TECHNIQUE:  Single frontal view of the chest was performed.    COMPARISON:  01/21/2020.    FINDINGS:  Cardiac silhouette is normal in size.  Lungs are symmetrically expanded.  Minimal ground-glass attenuation is seen within the left mid lung, although less pronounced compared to previous radiograph.  No evidence of new focal consolidation.  No pneumothorax or significant pleural effusion.  No acute osseous abnormality identified.                                   Medical Decision Making:   ED Management:  Urgent evaluation of 88-year-old male who presents with complaints of diarrhea hypotension and hypoglycemia initially now improved after drinking a Coke just prior to arrival.  He is afebrile, nontoxic appearing, hemodynamically stable after L of fluids provided by EMS.  105/58.  Physical exam reveals benign abdomen.  Diagnostic labs pending.    No leukocytosis, acute anemia, acute electrolyte abnormality or elevation in lactic acid noted.  Patient remains asymptomatic throughout the majority of ED stay.  Does have a single hypotensive blood  pressure reading with systolic in the 60s.  Suspect this is an air given immediately following repeat blood pressure patient has systolic documented at 1:16 a.m..  He had no abnormal mentation and continues to have no complaints.  Urinary analysis reveals nitrite positive UTI.  Given patient's initial hypotension documented by EMS, confirmed infectious process and advanced age with comorbidities I will recommend hospital admission with observation status and will initiate IV antibiotics and fluid repletion.  Patient made aware of plan and verbalizes understanding.  Discussed advance directives with the patient's accompanying daughter who reports that they are not ready to make any decisions at this time and that she will discuss this with her brother tomorrow.  Patient remains full code.  Case discussed extensively with attending physician who agrees with admission plan.  Case discussed with moonlighter who is amenable to admission.  Patient stable for transport to the floor.  Other:   I have discussed this case with another health care provider.       <> Summary of the Discussion: Tati Mckee                                 Clinical Impression:       ICD-10-CM ICD-9-CM   1. Urinary tract infection without hematuria, site unspecified N39.0 599.0   2. Sepsis A41.9 038.9     995.91   3. Diarrhea, unspecified type R19.7 787.91                             Jerri Schuster PA-C  02/12/20 0110

## 2020-02-12 NOTE — PLAN OF CARE
Problem: Physical Therapy Goal  Goal: Physical Therapy Goal  Description  Goals to be met by: 2020    Patient will increase functional independence with mobility by performin. Sit<>stand/squat with min A with no AD.  2. Supine<>sit with min A  3. Squat pivot EOB <>BSC with mod A   Outcome: Ongoing, Progressing  Pt initially resistant agreeable to EOB. Bed mobility supine<>sit with max A, static sitting balance poor. Pt deferred attempts at standing sat EOB x 3 min with mod-> max A.  Return to supine with max A. Pt performed AA there ex B LE in all available planes and ranges. Pt positioned for pressure relief with heels elevated.

## 2020-02-13 PROBLEM — N39.0 URINARY TRACT INFECTION WITHOUT HEMATURIA: Status: RESOLVED | Noted: 2020-02-11 | Resolved: 2020-02-13

## 2020-02-13 PROBLEM — R23.8 SKIN BREAKDOWN: Status: ACTIVE | Noted: 2020-02-13

## 2020-02-13 PROBLEM — I95.9 HYPOTENSION: Status: RESOLVED | Noted: 2020-02-11 | Resolved: 2020-02-13

## 2020-02-13 LAB
ALBUMIN SERPL BCP-MCNC: 2.4 G/DL (ref 3.5–5.2)
ALP SERPL-CCNC: 61 U/L (ref 55–135)
ALT SERPL W/O P-5'-P-CCNC: 7 U/L (ref 10–44)
ANION GAP SERPL CALC-SCNC: 2 MMOL/L (ref 8–16)
AST SERPL-CCNC: 14 U/L (ref 10–40)
BASOPHILS # BLD AUTO: 0.01 K/UL (ref 0–0.2)
BASOPHILS NFR BLD: 0.2 % (ref 0–1.9)
BILIRUB SERPL-MCNC: 0.8 MG/DL (ref 0.1–1)
BUN SERPL-MCNC: 10 MG/DL (ref 8–23)
CALCIUM SERPL-MCNC: 8.3 MG/DL (ref 8.7–10.5)
CHLORIDE SERPL-SCNC: 110 MMOL/L (ref 95–110)
CO2 SERPL-SCNC: 25 MMOL/L (ref 23–29)
CREAT SERPL-MCNC: 0.6 MG/DL (ref 0.5–1.4)
DIFFERENTIAL METHOD: ABNORMAL
EOSINOPHIL # BLD AUTO: 0.1 K/UL (ref 0–0.5)
EOSINOPHIL NFR BLD: 2 % (ref 0–8)
ERYTHROCYTE [DISTWIDTH] IN BLOOD BY AUTOMATED COUNT: 16.5 % (ref 11.5–14.5)
EST. GFR  (AFRICAN AMERICAN): >60 ML/MIN/1.73 M^2
EST. GFR  (NON AFRICAN AMERICAN): >60 ML/MIN/1.73 M^2
ESTIMATED AVG GLUCOSE: 82 MG/DL (ref 68–131)
GLUCOSE SERPL-MCNC: 67 MG/DL (ref 70–110)
HBA1C MFR BLD HPLC: 4.5 % (ref 4–5.6)
HCT VFR BLD AUTO: 33.7 % (ref 40–54)
HGB BLD-MCNC: 11.1 G/DL (ref 14–18)
IMM GRANULOCYTES # BLD AUTO: 0 K/UL (ref 0–0.04)
IMM GRANULOCYTES NFR BLD AUTO: 0 % (ref 0–0.5)
INR PPP: 1.1 (ref 0.8–1.2)
LYMPHOCYTES # BLD AUTO: 1.9 K/UL (ref 1–4.8)
LYMPHOCYTES NFR BLD: 34.1 % (ref 18–48)
MAGNESIUM SERPL-MCNC: 1.6 MG/DL (ref 1.6–2.6)
MCH RBC QN AUTO: 27.1 PG (ref 27–31)
MCHC RBC AUTO-ENTMCNC: 32.9 G/DL (ref 32–36)
MCV RBC AUTO: 82 FL (ref 82–98)
MONOCYTES # BLD AUTO: 0.5 K/UL (ref 0.3–1)
MONOCYTES NFR BLD: 9 % (ref 4–15)
NEUTROPHILS # BLD AUTO: 3 K/UL (ref 1.8–7.7)
NEUTROPHILS NFR BLD: 54.7 % (ref 38–73)
NRBC BLD-RTO: 0 /100 WBC
PHOSPHATE SERPL-MCNC: 2 MG/DL (ref 2.7–4.5)
PLATELET # BLD AUTO: 142 K/UL (ref 150–350)
PMV BLD AUTO: 10.2 FL (ref 9.2–12.9)
POCT GLUCOSE: 122 MG/DL (ref 70–110)
POCT GLUCOSE: 65 MG/DL (ref 70–110)
POCT GLUCOSE: 79 MG/DL (ref 70–110)
POCT GLUCOSE: 81 MG/DL (ref 70–110)
POTASSIUM SERPL-SCNC: 4.2 MMOL/L (ref 3.5–5.1)
PROT SERPL-MCNC: 5.7 G/DL (ref 6–8.4)
PROTHROMBIN TIME: 12.7 SEC (ref 9–12.5)
RBC # BLD AUTO: 4.09 M/UL (ref 4.6–6.2)
SODIUM SERPL-SCNC: 137 MMOL/L (ref 136–145)
WBC # BLD AUTO: 5.42 K/UL (ref 3.9–12.7)

## 2020-02-13 PROCEDURE — 85025 COMPLETE CBC W/AUTO DIFF WBC: CPT

## 2020-02-13 PROCEDURE — 36415 COLL VENOUS BLD VENIPUNCTURE: CPT

## 2020-02-13 PROCEDURE — 63600175 PHARM REV CODE 636 W HCPCS: Performed by: HOSPITALIST

## 2020-02-13 PROCEDURE — 99233 SBSQ HOSP IP/OBS HIGH 50: CPT | Mod: ,,, | Performed by: HOSPITALIST

## 2020-02-13 PROCEDURE — 84100 ASSAY OF PHOSPHORUS: CPT

## 2020-02-13 PROCEDURE — 80053 COMPREHEN METABOLIC PANEL: CPT

## 2020-02-13 PROCEDURE — 82533 TOTAL CORTISOL: CPT

## 2020-02-13 PROCEDURE — 85610 PROTHROMBIN TIME: CPT

## 2020-02-13 PROCEDURE — 94761 N-INVAS EAR/PLS OXIMETRY MLT: CPT

## 2020-02-13 PROCEDURE — 83735 ASSAY OF MAGNESIUM: CPT

## 2020-02-13 PROCEDURE — 97802 MEDICAL NUTRITION INDIV IN: CPT

## 2020-02-13 PROCEDURE — 63600175 PHARM REV CODE 636 W HCPCS: Performed by: NURSE PRACTITIONER

## 2020-02-13 PROCEDURE — 99233 PR SUBSEQUENT HOSPITAL CARE,LEVL III: ICD-10-PCS | Mod: ,,, | Performed by: HOSPITALIST

## 2020-02-13 PROCEDURE — 97110 THERAPEUTIC EXERCISES: CPT | Mod: CQ

## 2020-02-13 PROCEDURE — 25000003 PHARM REV CODE 250: Performed by: NURSE PRACTITIONER

## 2020-02-13 PROCEDURE — 11000001 HC ACUTE MED/SURG PRIVATE ROOM

## 2020-02-13 RX ORDER — COSYNTROPIN 0.25 MG/ML
0.25 INJECTION, POWDER, FOR SOLUTION INTRAMUSCULAR; INTRAVENOUS ONCE
Status: COMPLETED | OUTPATIENT
Start: 2020-02-13 | End: 2020-02-13

## 2020-02-13 RX ORDER — DEXTROSE, SODIUM CHLORIDE, SODIUM LACTATE, POTASSIUM CHLORIDE, AND CALCIUM CHLORIDE 5; .6; .31; .03; .02 G/100ML; G/100ML; G/100ML; G/100ML; G/100ML
INJECTION, SOLUTION INTRAVENOUS CONTINUOUS
Status: DISCONTINUED | OUTPATIENT
Start: 2020-02-13 | End: 2020-02-16

## 2020-02-13 RX ADMIN — SODIUM CHLORIDE, SODIUM LACTATE, POTASSIUM CHLORIDE, AND CALCIUM CHLORIDE: .6; .31; .03; .02 INJECTION, SOLUTION INTRAVENOUS at 10:02

## 2020-02-13 RX ADMIN — COSYNTROPIN 0.25 MG: 0.25 INJECTION, POWDER, LYOPHILIZED, FOR SOLUTION INTRAMUSCULAR; INTRAVENOUS at 03:02

## 2020-02-13 RX ADMIN — DEXTROSE, SODIUM CHLORIDE, SODIUM LACTATE, POTASSIUM CHLORIDE, AND CALCIUM CHLORIDE: 5; .6; .31; .03; .02 INJECTION, SOLUTION INTRAVENOUS at 12:02

## 2020-02-13 RX ADMIN — CEFTRIAXONE 1 G: 1 INJECTION, SOLUTION INTRAVENOUS at 10:02

## 2020-02-13 RX ADMIN — DEXTROSE, SODIUM CHLORIDE, SODIUM LACTATE, POTASSIUM CHLORIDE, AND CALCIUM CHLORIDE: 5; .6; .31; .03; .02 INJECTION, SOLUTION INTRAVENOUS at 10:02

## 2020-02-13 RX ADMIN — WARFARIN SODIUM 6 MG: 5 TABLET ORAL at 04:02

## 2020-02-13 RX ADMIN — CYANOCOBALAMIN TAB 1000 MCG 1000 MCG: 1000 TAB at 08:02

## 2020-02-13 RX ADMIN — FOLIC ACID 1 MG: 1 TABLET ORAL at 08:02

## 2020-02-13 NOTE — PLAN OF CARE
CM met with pt's daughter, provided her a list of hospice companies. Daughter, Ronal, to call CM when she has selected 2 companies to interview. Daughter states she wants to keep her dad home with her rather than a facility.    CM to follow for plans and arrangemtns.

## 2020-02-13 NOTE — CONSULTS
"  [facility]  Adult Nutrition  Consult Note    SUMMARY     Intervention: High kcal/high protein diet and commercial beverage    Recommendations    1. Recommend High kcal/ High protein diet and Giuseppe BID to assist with Pressure Injury.   2. Encourage PO intake of meals and commercial beverage.     Goals: 1.>75% of EEN, EPN by RD follow up.   Nutrition Goal Status: new    Reason for Assessment    Reason For Assessment: consult  Diagnosis: (Sepsis due to urinary tract infection )  Relevant Medical History: HTN, HLD, DM2  Interdisciplinary Rounds: did not attend  General Information Comments:   2- Pt is a 88-year-old male, diabetes with unstageable PI to the left hip who presents via EMS with complaints of multiple episodes of diarrhea and low blood sugar. Po intake 50% of meals, pt needs assisstance with meals. Pt educated on drinking boost. If 3 boost are consumed/day pt will meet 62% EEN and 68% EPN. Pt has decreased appetite x 3 months. Severe change in weight, -50 lbs x 10 months. -29 % weight loss x 10 months. NFPE 2.- severe depletion of L/E's and clavicle. No edema noted. Severe malnutrition in the context of chronic illness.   Nutrition Discharge Planning: Adequate nutrition to meet needs via high kcal/high protein diet.     Nutrition Risk Screen    Nutrition Risk Screen: large or nonhealing wound, burn or pressure injury    Nutrition/Diet History    Patient Reported Diet/Restrictions/Preferences: general  Spiritual, Cultural Beliefs, Baptism Practices, Values that Affect Care: no    Anthropometrics    Height Method: Measured  Height: 5' 6" (167.6 cm)  Height (inches): 66 in  Weight Method: Bed Scale  Weight: 53 kg (116 lb 13.5 oz)  Weight (lb): 116.84 lb  Ideal Body Weight (IBW), Male: 142 lb  % Ideal Body Weight, Male (lb): 82.28 %  BMI (Calculated): 18.9  BMI Grade: 18.5-24.9 - normal  Usual Body Weight (UBW), k.4 kg  % Usual Body Weight: 70.44  % Weight Change From " Usual Weight: -29.71 %     Lab/Procedures/Meds    Pertinent Labs Reviewed: reviewed  Pertinent Labs Comments: Glucose 67, Phos 2.0  Pertinent Medications Reviewed: reviewed  Pertinent Medications Comments: Folic acid, Warfarin    Estimated/Assessed Needs    Weight Used For Calorie Calculations: 53 kg (116 lb 13.5 oz)  Energy Calorie Requirements (kcal): 2898-3772 kcals/day(30-35 kcal/kg Pressure Injury)  Energy Need Method: Kcal/kg  Protein Requirements: 66.25- 79.67 g/day(1.25-1.5 g/kg Pressure Injury)  Weight Used For Protein Calculations: 53 kg (116 lb 13.5 oz)  Fluid Requirements (mL): 1mL/kcal or per MD  Estimated Fluid Requirement Method: RDA Method  RDA Method (mL): 1590  CHO Requirement: 198 g CHO     Nutrition Prescription Ordered    Current Diet Order: Regular    Evaluation of Received Nutrient/Fluid Intake    Energy Calories Required: not meeting needs  Protein Required: not meeting needs  Fluid Required: meeting needs  Comments: LBM 2.12.20  % Intake of Estimated Energy Needs: 25 - 50 %  % Meal Intake: 25 - 50 %    Nutrition Risk    Level of Risk/Frequency of Follow-up: moderate - high     Assessment and Plan    Severe malnutrition  Malnutrition in the context of Chronic Illness/Injury     Related to (etiology):  Inadequate energy intake      Signs and Symptoms (as evidenced by):  Energy Intake: <75% of estimated energy requirement x 3 months  Body Fat Depletion: moderate depletion of orbitals, triceps and thoracic   Muscle Mass Depletion: severe depletion of clavicle region, scapular region and lower extremities  Weight Loss: greater than 20% in 1 year ( -29 % weight loss x 10 months)     Interventions/Recommendations (treatment strategy):  Collaboration with other providers  Commercial Beverage    Nutrition Diagnosis Status:  New     Monitor and Evaluation    Food and Nutrient Intake: food and beverage intake  Food and Nutrient Adminstration: diet order  Knowledge/Beliefs/Attitudes: food and nutrition  knowledge/skill  Physical Activity and Function: nutrition-related ADLs and IADLs  Anthropometric Measurements: weight  Biochemical Data, Medical Tests and Procedures: lipid profile, glucose/endocrine profile  Nutrition-Focused Physical Findings: overall appearance     Malnutrition Assessment    Dani Score: 13  Malnutrition Type: chronic illness  Skin (Micronutrient): cracked, dry   Weight Loss (Malnutrition): greater than 20% in 1 year  Energy Intake (Malnutrition): less than 75% for greater than or equal to 3 months  Subcutaneous Fat (Malnutrition): moderate depletion  Muscle Mass (Malnutrition): severe depletion   Orbital Region (Subcutaneous Fat Loss): moderate depletion  Upper Arm Region (Subcutaneous Fat Loss): moderate depletion  Thoracic and Lumbar Region: moderate depletion   Jehovah's witness Region (Muscle Loss): moderate depletion  Clavicle Bone Region (Muscle Loss): severe depletion  Clavicle and Acromion Bone Region (Muscle Loss): severe depletion  Scapular Bone Region (Muscle Loss): severe depletion  Dorsal Hand (Muscle Loss): moderate depletion  Patellar Region (Muscle Loss): severe depletion  Anterior Thigh Region (Muscle Loss): severe depletion  Posterior Calf Region (Muscle Loss): severe depletion   Edema (Fluid Accumulation): 0-->no edema present     Nutrition Follow-Up    RD Follow-up?: Yes

## 2020-02-13 NOTE — CONSULTS
Consult Note  Palliative Care      Consult Requested By: Darrel Stein MD  Reason for Consult:GOC and AD    SUBJECTIVE:     History of Present Illness:  Chief Complaint   Patient presents with    Diarrhea       diarrhea since 5 am. 79/48 with EMS. received 1L of LR      Patient is a 88-year-old male history of AFib on Coumadin, hypertension, hyperlipidemia, diabetes with bedsore to the left hip who presents via EMS with complaints of multiple episodes of diarrhea and low blood sugar.  Patient's daughter is his primary caregiver and she reports that patient was in his usual state of health but at 5:00 a.m. this morning he started with very loose watery stools.  She reports he has had a bowel movement approximately every 0.5 hr.  There has been no obvious blood but patient does report that there has been unusual smell did stool.  There is no known sick contacts.  Patient has not tolerated any food or fluids today but this is because of poor appetite.  Patient has no vomiting.  There is no cough or cold symptoms or fever.  Patient's daughter reports home health nurse came to evaluate the patient and determined that his blood sugar was low so she gave the patient a Coke to drink.  The patient's primary provider was consulted via phone by the home health nurse and the patient was instructed to come straight to the emergency department via EMS.  EMS blood sugar was noted to be 125 on arrival patient had hypotension with 70 systolic and received 1 L of lactated Ringer's in route.  Patient adamantly denies any abdominal pain, chest pain, shortness of breath, bleeding, headache, dizziness or confusion.  Patient's daughter does cooperate no complaints and normal affect. Daughter is present throughout entire interview and exam.  Initial Vitals [02/11/20 1810]   BP Pulse Resp Temp SpO2   (!) 105/58 71 18 97.8 °F (36.6 °C) 98 %     2/11/20 Admission Weight:  Weight: 53 kg (116 lb 13.5 oz)  BMI: 18.86 kg/m².    1/22/20  Weight Noted in Chart Review:  Weight: 70.3kg (155 lb)  BMI: 22.89 kg/m².      CBC W/ AUTO DIFFERENTIAL - Abnormal; Notable for the following components:       Result Value      RBC 4.05 (*)       Hemoglobin 11.3 (*)       Hematocrit 33.5 (*)       RDW 17.1 (*)       All other components within normal limits   COMPREHENSIVE METABOLIC PANEL - Abnormal; Notable for the following components:     Albumin 2.8 (*)       Total Bilirubin 1.9 (*)       ALT 7 (*)       Anion Gap 7 (*)       All other components within normal limits   PROTIME-INR - Abnormal; Notable for the following components:     Prothrombin Time 12.7 (*)       All other components within normal limits   URINALYSIS - Abnormal; Notable for the following components:     Protein, UA Trace (*)       Ketones, UA 1+ (*)       Bilirubin (UA) 1+ (*)       Occult Blood UA Trace (*)       Nitrite, UA Positive (*)       Urobilinogen, UA 2.0-3.0 (*)       Leukocytes, UA 1+ (*)       All other components within normal limits   URINALYSIS MICROSCOPIC - Abnormal; Notable for the following components:     WBC, UA 11 (*)       Bacteria Moderate (*)       Yeast, UA Few (*)       All other components within normal limits   PHOSPHORUS - Abnormal; Notable for the following components:     Phosphorus 2.4 (*)       All other components within normal limits     2/11/20 XR CHEST AP PORTABLE  Cardiac silhouette is normal in size.  Lungs are symmetrically expanded.  Minimal ground-glass attenuation is seen within the left mid lung, although less pronounced compared to previous radiograph.  No evidence of new focal consolidation.  No pneumothorax or significant pleural effusion.  No acute osseous abnormality identified.    2/11/20 TTE Results:  · Left ventricular systolic function. The estimated ejection fraction is 45%.  · Mild global hypokinetic wall motion.  · Atrial fibrillation observed.  · Moderate right ventricular enlargement.  · Mildly reduced right ventricular systolic  function.  · Moderate right atrial enlargement.  · Mild mitral sclerosis.  · Mild mitral regurgitation.  · Mild tricuspid regurgitation.  · The estimated PA systolic pressure is 15 mmHg.  · Normal central venous pressure (3 mmHg).    5/1/14 TTE Results:    1 - Mild left atrial enlargement.     2 - Mildly to moderately depressed left ventricular systolic function (EF 40-45%).     3 - Normal left ventricular diastolic function.     4 - Normal right ventricular systolic function .     5 - Mild mitral regurgitation.     6 - Intermediate central venous pressure.     <<<2/11/20 Blood Cultures - Prelim, no growth to date>>>     <<< Influenza A & B pending>>>>    <<< Last discharge on 1/25/20, recommendation for PT/OT & HH>>>      Past Medical History:   Diagnosis Date    *Atrial fibrillation     Diabetes mellitus type II     Glaucoma     Hyperlipidemia     Hypertension     Kyphosis      Past Surgical History:   Procedure Laterality Date    CHOLECYSTECTOMY       Family History   Problem Relation Age of Onset    Melanoma Neg Hx      Social History     Tobacco Use    Smoking status: Former Smoker     Start date: 4/16/1984    Smokeless tobacco: Former User   Substance Use Topics    Alcohol use: No    Drug use: No       Mental Status: Oriented x3, somnolence noted.    ECOG Performance Status Grade: 4 - Completely disabled. Daughter reports Pt is incontent of bladder and bowel. He does not feel sensation for urination or bowel movement.         OBJECTIVE:     Pain Assessment: No pain reported at this time    Decision-Making Capacity: Patient answered questions, Family answered questions. Pt is blind, daughter Ronal is main caregiver and signs documents for pt.    Advanced Directives:  Living Will: No  Do Not Resuscitate Status: No  Medical Power of : No  Registered Organ Donor: No    Living Arrangements: Lives with family, Lives in home. Lives with daughter Ronal. She is able to be with Pt full time at  home as primary caregiver.    Psychosocial, Spiritual, Cultural:  Patient's most important priorities:  1. Return home  2. Not be placed in a facility  3. Comfort    Patient's biggest concerns/fears:  1. Being placed  In a facility    Previous death/end of life care history:  1. Loss of wife    Patient's goals/hopes:  1. Return home  2. Not be placed in a facility  3. Comfort      ASSESSMENT/PLAN:     Constitutional: Chronically ill appearing. NAD at present. Afebrile.  Eyes: Pt is blind. Daughter Ronal coordinates signing documents for Pt.  HENT: Normocephalic.  Cardiovascular: Hypotention noted. Irregularly irregular heart tones noted.  Pulmonary: Respirations even and unlabored on RA. Course BBS.  Abdominal: ABD soft and flat. Pt has had multiple episodes of diarrhea.   Musculoskeletal: Limited ROM in BUE and BLE. Pt has PI to sacrum. Nonblanchable redness to area.  Integumentary: PI to Left lateral Hip on admission.  Neurological: AAOx3. Pt was able to previously walk with walker.  Daughter states that Pt is now needing increased assistance with ADLs. Assist x 1 to 2 people. He   Psychiatric: Pt is calm. Thought content is WNL.    Symptom Assessment (ESAS 0-10 scale)   ESAS 0 1 2 3 4 5 6 7 8 9 10   Pain x             Dyspnea x             Anxiety    x          Nausea   x           Depression      x         Anorexia      x        Fatigue      x        Insomnia x             Restlessness  x             Agitation x             Constipation- last Bm noted on 2/12/20      Diarrhea  - yes           Bowel Management Plan (BMP):        Thank you for allowing Palliative Medicine to participate in Mr. Vinh miranda.  Shobha Tomlinson RN  Palliative Medicine  [facility]      Recommendations:  1. Order for stool sample for c-diff and occult blood. Last discharge on 1/22/20    2. Pt needs the following equipment: Jt lift, overlay for mattress, BST    3. Referral to Hospice of Pt and daughter's choosing.  Ora mukherjee in CM to give list of companies for daughter to choose from.    4. Consider starting anti-depressant for pt's depression.    Time Spent: 15 minute chart noted, 10 minute team update, 75 minute BS assessment and consultation, 15 minute update, 10 miinutes coordinating ACP documents, 15 minutes charting.

## 2020-02-13 NOTE — PLAN OF CARE
VSS on room air, alert and oriented X 4,  wound care done this shift. Purposeful rounding completed, pt free of falls or injury, bed lowed and in lock position, call light in reach. Will continue to monitor

## 2020-02-13 NOTE — SUBJECTIVE & OBJECTIVE
Interval History:  No acute events overnight.  Hypoglycemia.    Review of Systems   Constitutional: Negative for chills and fever.   Respiratory: Negative for shortness of breath.    Cardiovascular: Negative for chest pain.   Gastrointestinal: Negative for abdominal pain, constipation, diarrhea, nausea and vomiting.   Neurological: Positive for weakness.     Objective:     Vital Signs (Most Recent):  Temp: 97.9 °F (36.6 °C) (02/13/20 0737)  Pulse: 70 (02/13/20 1000)  Resp: 18 (02/13/20 0737)  BP: 100/61 (02/13/20 0737)  SpO2: 95 % (02/13/20 0737) Vital Signs (24h Range):  Temp:  [97 °F (36.1 °C)-97.9 °F (36.6 °C)] 97.9 °F (36.6 °C)  Pulse:  [] 70  Resp:  [16-22] 18  SpO2:  [94 %-100 %] 95 %  BP: ()/(48-66) 100/61     Weight: 53 kg (116 lb 13.5 oz)  Body mass index is 18.86 kg/m².    Intake/Output Summary (Last 24 hours) at 2/13/2020 1054  Last data filed at 2/13/2020 1032  Gross per 24 hour   Intake 2173 ml   Output 1850 ml   Net 323 ml      Physical Exam   Constitutional: He is oriented to person, place, and time.   Thin significant diffuse muscle wasting.   Eyes:   Blind   Cardiovascular: Normal rate, regular rhythm, normal heart sounds and intact distal pulses. Exam reveals no gallop and no friction rub.   No murmur heard.  Pulmonary/Chest: Effort normal and breath sounds normal. No respiratory distress. He has no wheezes. He has no rales.   Abdominal: Soft. Bowel sounds are normal. He exhibits no distension. There is no tenderness.   Musculoskeletal: Normal range of motion. He exhibits no edema or tenderness.   Neurological: He is alert and oriented to person, place, and time.   Skin: Skin is warm and dry. No rash noted. No erythema. No pallor.   Multiple areas of skin breakdown which do not appear acutely infected.  Dry skin.       Significant Labs: All pertinent labs within the past 24 hours have been reviewed.    Significant Imaging: I have reviewed all pertinent imaging results/findings within  the past 24 hours.

## 2020-02-13 NOTE — PLAN OF CARE
Problem: Malnutrition  Goal: Improved Nutritional Intake  Outcome: Ongoing, Progressing     Intervention: High kcal/high protein diet and commercial beverage    Recommendations    1. Recommend High kcal/ High protein diet and Giuseppe BID to assist with Pressure Injury.   2. Encourage PO intake of meals and commercial beverage.     Goals: 1.>75% of EEN, EPN by RD follow up.   Nutrition Goal Status: new

## 2020-02-13 NOTE — NURSING
Patient stable. IV fluids and suction set up. Telemetry monitor placed on patient. Turned to the left with wedge. Music playing. Bed alarm set.               Reported off to night nurseRyan.

## 2020-02-13 NOTE — ASSESSMENT & PLAN NOTE
Hypoglycemic on presentation.  Patient continues to have low capillary blood glucose measurements reading.  Will give intravenous dextrose encourage oral intake.  Will assess for adrenal insufficiency.

## 2020-02-13 NOTE — PT/OT/SLP PROGRESS
Physical Therapy Treatment    Patient Name:  Chris Asencio   MRN:  [id number]    Recommendations:     Discharge Recommendations:  home   Discharge Equipment Recommendations: (TBD- may need jam)   Barriers to discharge: None    Assessment:     Chris Asencio is a 88 y.o. male admitted with a medical diagnosis of Sepsis due to urinary tract infection.  He presents with the following impairments/functional limitations:  weakness, impaired functional mobilty, decreased safety awareness, impaired self care skills, impaired skin .    Supine<>sit Girish at trunk for lifting/lowering assist. Pt performed LAQ, HF & AP x10 while seated EOB with supervision. Pt performed rolling R to L in bed with Girish for changing pad under pt.    Rehab Prognosis: Good; patient would benefit from acute skilled PT services to address these deficits and reach maximum level of function.    Recent Surgery: * No surgery found *      Plan:     During this hospitalization, patient to be seen 5 x/week to address the identified rehab impairments via neuromuscular re-education, therapeutic exercises, therapeutic activities and progress toward the following goals:    · Plan of Care Expires:  03/12/20    Subjective     Chief Complaint: none stated  Patient/Family Comments/goals: none stated  Pain/Comfort:  · Pain Rating 1: 0/10  · Pain Rating Post-Intervention 1: 0/10      Objective:     Communicated with nurse Payan prior to session.  Patient found supine with Condom Catheter, peripheral IV upon PT entry to room.     General Precautions: Standard, fall   Orthopedic Precautions:N/A   Braces: N/A     Functional Mobility:  · Bed mobility- supine <>sit Girish for lifting assist. Pt performed rolling R to L in bed with Girish for changing pad under pt.  · Transfer- to be assessed by PT tomorrow.      AM-PAC 6 CLICK MOBILITY  Turning over in bed (including adjusting bedclothes, sheets and blankets)?: 3  Sitting down on and standing up from a chair with arms (e.g.,  wheelchair, bedside commode, etc.): 2  Moving from lying on back to sitting on the side of the bed?: 3  Moving to and from a bed to a chair (including a wheelchair)?: 2  Need to walk in hospital room?: 1  Climbing 3-5 steps with a railing?: 1  Basic Mobility Total Score: 12       Therapeutic Activities and Exercises:  Pt performed LAQ, HF & AP x10 while seated EOB with supervision.     Patient left supine with all lines intact, call button in reach and PCTs present to change condom cath.    GOALS:   Multidisciplinary Problems     Physical Therapy Goals        Problem: Physical Therapy Goal    Goal Priority Disciplines Outcome Goal Variances Interventions   Physical Therapy Goal     PT, PT/OT Ongoing, Progressing     Description:  Goals to be met by: 2020    Patient will increase functional independence with mobility by performin. Sit<>stand/squat with min A with no AD.  2. Supine<>sit with min A  3. Squat pivot EOB <>BSC with mod A                    Time Tracking:     PT Received On: 20  PT Start Time: 908     PT Stop Time: 923  PT Total Time (min): 15 min     Billable Minutes: Therapeutic Exercise 15    Treatment Type: Treatment  PT/PTA: PTA     PTA Visit Number: Chucho     Otilia Coffman PTA  2020

## 2020-02-13 NOTE — ASSESSMENT & PLAN NOTE
Clinically improved with empiric antibiotics and with volume resuscitation.  Hypotension resolved.  Blood in urine cultures no growth today.  Continue empiric antibiotics for now.

## 2020-02-13 NOTE — PLAN OF CARE
Problem: Physical Therapy Goal  Goal: Physical Therapy Goal  Description  Goals to be met by: 2020    Patient will increase functional independence with mobility by performin. Sit<>stand/squat with min A with no AD.  2. Supine<>sit with min A  3. Squat pivot EOB <>BSC with mod A   Outcome: Ongoing, Progressing   Pt displayed significant improvement in bed mobility & EOB sitting balance today. Supine<>sit Girish at trunk for lifting/lowering assist. Pt performed LAQ, HF & AP x10 while seated EOB with supervision. Pt performed rolling R to L in bed with Girish for changing pad under pt.

## 2020-02-14 PROBLEM — S91.109A WOUND, OPEN, TOE: Status: ACTIVE | Noted: 2020-02-14

## 2020-02-14 LAB
ALBUMIN SERPL BCP-MCNC: 2.2 G/DL (ref 3.5–5.2)
ALP SERPL-CCNC: 60 U/L (ref 55–135)
ALT SERPL W/O P-5'-P-CCNC: 5 U/L (ref 10–44)
ANION GAP SERPL CALC-SCNC: 7 MMOL/L (ref 8–16)
AST SERPL-CCNC: 11 U/L (ref 10–40)
BACTERIA UR CULT: ABNORMAL
BASOPHILS # BLD AUTO: 0.01 K/UL (ref 0–0.2)
BASOPHILS NFR BLD: 0.2 % (ref 0–1.9)
BILIRUB SERPL-MCNC: 0.4 MG/DL (ref 0.1–1)
BUN SERPL-MCNC: 7 MG/DL (ref 8–23)
CALCIUM SERPL-MCNC: 8.2 MG/DL (ref 8.7–10.5)
CHLORIDE SERPL-SCNC: 107 MMOL/L (ref 95–110)
CO2 SERPL-SCNC: 27 MMOL/L (ref 23–29)
CORTIS SERPL-MCNC: 10.7 UG/DL
CORTIS SERPL-MCNC: 20.1 UG/DL
CREAT SERPL-MCNC: 0.6 MG/DL (ref 0.5–1.4)
DIFFERENTIAL METHOD: ABNORMAL
EOSINOPHIL # BLD AUTO: 0.1 K/UL (ref 0–0.5)
EOSINOPHIL NFR BLD: 0.9 % (ref 0–8)
ERYTHROCYTE [DISTWIDTH] IN BLOOD BY AUTOMATED COUNT: 16.8 % (ref 11.5–14.5)
EST. GFR  (AFRICAN AMERICAN): >60 ML/MIN/1.73 M^2
EST. GFR  (NON AFRICAN AMERICAN): >60 ML/MIN/1.73 M^2
GLUCOSE SERPL-MCNC: 104 MG/DL (ref 70–110)
HCT VFR BLD AUTO: 31.5 % (ref 40–54)
HGB BLD-MCNC: 10.4 G/DL (ref 14–18)
IMM GRANULOCYTES # BLD AUTO: 0.01 K/UL (ref 0–0.04)
IMM GRANULOCYTES NFR BLD AUTO: 0.2 % (ref 0–0.5)
INR PPP: 1.1 (ref 0.8–1.2)
LYMPHOCYTES # BLD AUTO: 2 K/UL (ref 1–4.8)
LYMPHOCYTES NFR BLD: 35 % (ref 18–48)
MAGNESIUM SERPL-MCNC: 1.5 MG/DL (ref 1.6–2.6)
MCH RBC QN AUTO: 27.3 PG (ref 27–31)
MCHC RBC AUTO-ENTMCNC: 33 G/DL (ref 32–36)
MCV RBC AUTO: 83 FL (ref 82–98)
MONOCYTES # BLD AUTO: 0.5 K/UL (ref 0.3–1)
MONOCYTES NFR BLD: 9.2 % (ref 4–15)
NEUTROPHILS # BLD AUTO: 3 K/UL (ref 1.8–7.7)
NEUTROPHILS NFR BLD: 54.5 % (ref 38–73)
NRBC BLD-RTO: 0 /100 WBC
PHOSPHATE SERPL-MCNC: 1.9 MG/DL (ref 2.7–4.5)
PLATELET # BLD AUTO: 142 K/UL (ref 150–350)
PMV BLD AUTO: 10.2 FL (ref 9.2–12.9)
POCT GLUCOSE: 103 MG/DL (ref 70–110)
POCT GLUCOSE: 104 MG/DL (ref 70–110)
POCT GLUCOSE: 82 MG/DL (ref 70–110)
POCT GLUCOSE: 90 MG/DL (ref 70–110)
POTASSIUM SERPL-SCNC: 4.3 MMOL/L (ref 3.5–5.1)
PROT SERPL-MCNC: 5.4 G/DL (ref 6–8.4)
PROTHROMBIN TIME: 12.7 SEC (ref 9–12.5)
RBC # BLD AUTO: 3.81 M/UL (ref 4.6–6.2)
SODIUM SERPL-SCNC: 141 MMOL/L (ref 136–145)
WBC # BLD AUTO: 5.57 K/UL (ref 3.9–12.7)

## 2020-02-14 PROCEDURE — 85610 PROTHROMBIN TIME: CPT

## 2020-02-14 PROCEDURE — 99222 PR INITIAL HOSPITAL CARE,LEVL II: ICD-10-PCS | Mod: ,,, | Performed by: PODIATRIST

## 2020-02-14 PROCEDURE — 99233 SBSQ HOSP IP/OBS HIGH 50: CPT | Mod: ,,, | Performed by: HOSPITALIST

## 2020-02-14 PROCEDURE — 36415 COLL VENOUS BLD VENIPUNCTURE: CPT

## 2020-02-14 PROCEDURE — 99233 PR SUBSEQUENT HOSPITAL CARE,LEVL III: ICD-10-PCS | Mod: ,,, | Performed by: HOSPITALIST

## 2020-02-14 PROCEDURE — 84100 ASSAY OF PHOSPHORUS: CPT

## 2020-02-14 PROCEDURE — 85025 COMPLETE CBC W/AUTO DIFF WBC: CPT

## 2020-02-14 PROCEDURE — 83735 ASSAY OF MAGNESIUM: CPT

## 2020-02-14 PROCEDURE — 63600175 PHARM REV CODE 636 W HCPCS: Performed by: HOSPITALIST

## 2020-02-14 PROCEDURE — 25000003 PHARM REV CODE 250: Performed by: NURSE PRACTITIONER

## 2020-02-14 PROCEDURE — 80053 COMPREHEN METABOLIC PANEL: CPT

## 2020-02-14 PROCEDURE — 99222 1ST HOSP IP/OBS MODERATE 55: CPT | Mod: ,,, | Performed by: PODIATRIST

## 2020-02-14 PROCEDURE — 11000001 HC ACUTE MED/SURG PRIVATE ROOM

## 2020-02-14 RX ORDER — COSYNTROPIN 0.25 MG/ML
0.25 INJECTION, POWDER, FOR SOLUTION INTRAMUSCULAR; INTRAVENOUS ONCE
Status: COMPLETED | OUTPATIENT
Start: 2020-02-15 | End: 2020-02-15

## 2020-02-14 RX ADMIN — FOLIC ACID 1 MG: 1 TABLET ORAL at 10:02

## 2020-02-14 RX ADMIN — DEXTROSE, SODIUM CHLORIDE, SODIUM LACTATE, POTASSIUM CHLORIDE, AND CALCIUM CHLORIDE: 5; .6; .31; .03; .02 INJECTION, SOLUTION INTRAVENOUS at 10:02

## 2020-02-14 RX ADMIN — WARFARIN SODIUM 6 MG: 5 TABLET ORAL at 04:02

## 2020-02-14 RX ADMIN — CYANOCOBALAMIN TAB 1000 MCG 1000 MCG: 1000 TAB at 10:02

## 2020-02-14 RX ADMIN — DEXTROSE, SODIUM CHLORIDE, SODIUM LACTATE, POTASSIUM CHLORIDE, AND CALCIUM CHLORIDE: 5; .6; .31; .03; .02 INJECTION, SOLUTION INTRAVENOUS at 07:02

## 2020-02-14 NOTE — PROGRESS NOTES
Wound care provided per order to left hip and right 2nd toe wounds. Patient tolerated well, denies pain.

## 2020-02-14 NOTE — SUBJECTIVE & OBJECTIVE
Interval History:  No acute events overnight.  Hypoglycemia resolved however on continuous dextrose infusion.    Review of Systems   Constitutional: Negative for chills and fever.   Respiratory: Negative for shortness of breath.    Cardiovascular: Negative for chest pain.   Gastrointestinal: Negative for abdominal pain, constipation, diarrhea, nausea and vomiting.   Neurological: Positive for weakness.     Objective:     Vital Signs (Most Recent):  Temp: 98.1 °F (36.7 °C) (02/14/20 1148)  Pulse: 85 (02/14/20 1148)  Resp: 20 (02/14/20 1148)  BP: (!) 115/58 (02/14/20 1148)  SpO2: 99 % (02/14/20 1148) Vital Signs (24h Range):  Temp:  [97.6 °F (36.4 °C)-98.1 °F (36.7 °C)] 98.1 °F (36.7 °C)  Pulse:  [60-90] 85  Resp:  [18-20] 20  SpO2:  [90 %-100 %] 99 %  BP: ()/(50-61) 115/58     Weight: 53 kg (116 lb 13.5 oz)  Body mass index is 18.86 kg/m².    Intake/Output Summary (Last 24 hours) at 2/14/2020 1643  Last data filed at 2/14/2020 0745  Gross per 24 hour   Intake 870 ml   Output --   Net 870 ml      Physical Exam   Constitutional: He is oriented to person, place, and time.   Thin significant diffuse muscle wasting.   Eyes:   Blind   Cardiovascular: Normal rate, regular rhythm, normal heart sounds and intact distal pulses. Exam reveals no gallop and no friction rub.   No murmur heard.  Pulmonary/Chest: Effort normal and breath sounds normal. No respiratory distress. He has no wheezes. He has no rales.   Abdominal: Soft. Bowel sounds are normal. He exhibits no distension. There is no tenderness.   Musculoskeletal: Normal range of motion. He exhibits no edema or tenderness.   Neurological: He is alert and oriented to person, place, and time.   Skin: Skin is warm and dry. No rash noted. No erythema. No pallor.   Multiple areas of skin breakdown which do not appear acutely infected.  Dry skin.       Significant Labs: All pertinent labs within the past 24 hours have been reviewed.    Significant Imaging: I have reviewed  all pertinent imaging results/findings within the past 24 hours.

## 2020-02-14 NOTE — ASSESSMENT & PLAN NOTE
Clinically improved with empiric antibiotics and with volume resuscitation.  Hypotension resolved.  Blood cultures no growth.  Urine culture positive for coagulase-negative staph likely a contaminant.  Will repeat urinalysis and urine culture. Continue empiric antibiotics for now.

## 2020-02-14 NOTE — PLAN OF CARE
Assessed pt for spiritual distress - none presented nor reported.  Pt feels good about his care, and glad to be improving.   Pt requested a future spiritual care visit as time allows.  In the interim, follow-up care was offered upon request.

## 2020-02-14 NOTE — CONSULTS
[facility]  Podiatry  Consult Note    Patient Name: Chris Asencio  MRN: [id number]  Admission Date: 2/11/2020  Hospital Length of Stay: 2 days  Attending Physician: Darrel Stein MD  Primary Care Provider: Arleen Garcia MD     Inpatient consult to Podiatry  Consult performed by: Long Garcia DPM  Consult ordered by: Darrel Stein MD        Subjective:     History of Present Illness: wound right 2nd toe.  Unknown onset, aggravated by socks shoes, increased pressure..  No prior medical treatment. No self-treatment.  Denies signs of infection and previous trauma or surgery..  Consult performed 2/13/20, Note signed 2/14/20.    Scheduled Meds:   cefTRIAXone (ROCEPHIN) IVPB  1 g Intravenous Q24H    cyanocobalamin  1,000 mcg Oral Daily    folic acid  1 mg Oral Daily    warfarin  6 mg Oral Daily     Continuous Infusions:   dextrose 5% lactated ringers 100 mL/hr at 02/14/20 1009     PRN Meds:acetaminophen, dextrose 50%, dextrose 50%, glucagon (human recombinant), glucose, glucose, influenza, insulin aspart U-100, ondansetron, sodium chloride 0.9%, sodium chloride 0.9%    Review of patient's allergies indicates:  No Known Allergies     Past Medical History:   Diagnosis Date    *Atrial fibrillation     Diabetes mellitus type II     Glaucoma     Hyperlipidemia     Hypertension     Kyphosis      Past Surgical History:   Procedure Laterality Date    CHOLECYSTECTOMY         Family History     None        Tobacco Use    Smoking status: Former Smoker     Start date: 4/16/1984    Smokeless tobacco: Former User   Substance and Sexual Activity    Alcohol use: No    Drug use: No    Sexual activity: Not Currently     Partners: Female     Review of Systems  Objective:     Vital Signs (Most Recent):  Temp: 98.1 °F (36.7 °C) (02/14/20 1148)  Pulse: 85 (02/14/20 1148)  Resp: 20 (02/14/20 1148)  BP: (!) 115/58 (02/14/20 1148)  SpO2: 99 % (02/14/20 1148) Vital Signs (24h Range):  Temp:  [97.5  °F (36.4 °C)-98.1 °F (36.7 °C)] 98.1 °F (36.7 °C)  Pulse:  [60-90] 85  Resp:  [18-20] 20  SpO2:  [90 %-100 %] 99 %  BP: ()/(50-61) 115/58     Weight: 53 kg (116 lb 13.5 oz)  Body mass index is 18.86 kg/m².    Foot Exam    Laboratory:  A1C:   Recent Labs   Lab 01/21/20  2252 02/12/20  0443   HGBA1C 4.7 4.5     Blood Cultures: No results for input(s): LABBLOO in the last 48 hours.  BMP:   Recent Labs   Lab 02/14/20  0430         K 4.3      CO2 27   BUN 7*   CREATININE 0.6   CALCIUM 8.2*   MG 1.5*     CBC:   Recent Labs   Lab 02/14/20  0430   WBC 5.57   RBC 3.81*   HGB 10.4*   HCT 31.5*   *   MCV 83   MCH 27.3   MCHC 33.0     CMP:   Recent Labs   Lab 02/14/20  0430      CALCIUM 8.2*   ALBUMIN 2.2*   PROT 5.4*      K 4.3   CO2 27      BUN 7*   CREATININE 0.6   ALKPHOS 60   ALT 5*   AST 11   BILITOT 0.4     Coagulation:   Recent Labs   Lab 02/11/20  1908  02/14/20  0430   INR 1.1   < > 1.1   APTT 27.4  --   --     < > = values in this interval not displayed.     CRP: No results for input(s): CRP in the last 168 hours.  ESR: No results for input(s): SEDRATE in the last 168 hours.  Prealbumin: No results for input(s): PREALBUMIN in the last 48 hours.  Wound Cultures: No results for input(s): LABAERO in the last 4320 hours.  Microbiology Results (last 7 days)     Procedure Component Value Units Date/Time    Urine culture [[id number]]  (Abnormal) Collected:  02/12/20 0448    Order Status:  Completed Specimen:  Urine Updated:  02/14/20 1108     Urine Culture, Routine COAGULASE-NEGATIVE STAPHYLOCOCCUS SPECIES  50,000 - 99,999 cfu/ml  Susceptibility testing not routinely performed.      Narrative:       Preferred Collection Type->Urine, Clean Catch    Blood culture x two cultures. Draw prior to antibiotics. [[id number]] Collected:  02/11/20 2228    Order Status:  Completed Specimen:  Blood Updated:  02/13/20 1812     Blood Culture, Routine No Growth to date      No Growth to date     Narrative:       Aerobic and anaerobic    Blood culture x two cultures. Draw prior to antibiotics. [[id number]] Collected:  02/11/20 2159    Order Status:  Completed Specimen:  Blood from Peripheral, Antecubital, Right Updated:  02/13/20 1812     Blood Culture, Routine No Growth to date      No Growth to date    Narrative:       Aerobic and anaerobic    Influenza A & B by Molecular [[id number]] Collected:  02/11/20 2248    Order Status:  Sent Specimen:  Nasopharyngeal Swab Updated:  02/11/20 2249    Clostridium difficile EIA [[id number]]     Order Status:  Canceled Specimen:  Stool         Specimen (12h ago, onward)    None          Diagnostic Results:  None    Clinical Findings:  Wound:  Dorsum 2nd toe right    Size:    Pre:12x6x+3mm      Base:  Stable black eschar without drainage.  No pus, tracking, fluctuance, malodor, or cardinal signs infection.    Borders:  flat,blanchable skin edges without undermining.    Otherwise, Skin thin, shiny, atrophic, with decreased density and distribution of pedal hair bilateral, but without hyperpigmentation, josefina discoloration,  ulcers, masses, nodules or cords palpated bilateral feet and legs.    DP and PT pulses 1/4 bilateral, capillary refill 4 seconds all toes/distal feet, all toes/both feet warm to touch.  Negative lymphadenopathy bilateral popliteal fossa and tarsal tunnel.  Negavie lower extremity edema bilateral.     Diminished/loss of protective sensation all toes bilateral to 10 gram monofilament.    Decreased/absent vibratory sensation bilateral feet to 128Hz tuning fork.    Negative tinel sign to percussion sural, superficial peroneal, deep peroneal, saphenous, and posterior tibial nerves right and left ankles and feet.    Decreased MMT 4/5 all groups below knee bilateral.    Not ambulatory today.  All ten toes without clubbing, cyanosis, or signs of ischemia.  No pain to palpation bilateral lower extremities.  Range of motion, stability, muscle strength, and  muscle tone age and health appropriate normal bilateral feet and legs.       Assessment/Plan:     Active Diagnoses:    Diagnosis Date Noted POA    PRINCIPAL PROBLEM:  Sepsis due to urinary tract infection [A41.9, N39.0] 02/12/2020 Yes    Skin breakdown [L90.9] 02/13/2020 Yes    Unstageable pressure ulcer of hip [L89.200] 02/12/2020 Yes    Type II diabetes mellitus with complication [E11.8] 08/06/2014 Yes    Atrial fibrillation [I48.91] 04/09/2013 Yes      Problems Resolved During this Admission:    Diagnosis Date Noted Date Resolved POA    Urinary tract infection without hematuria [N39.0] 02/11/2020 02/13/2020 Yes    Hypotension [I95.9] 02/11/2020 02/13/2020 Yes       Discussed that we will work this patient in upon discharge for nailcare if desired.    Discussed conservative treatment with shoes of adequate dimensions, material, and style to alleviate symptoms and delay or prevent surgical intervention.    Recommend every other day dressings left 2nd toe with betadine swab and ligh gauze/tape to toe.    Recommend surgical shoe to protect toe in bed and during transfers.    Thank you for your consult. I will sign off. Please contact us if you have any additional questions.    Long Garcia DPM  Podiatry  [facility]

## 2020-02-14 NOTE — NURSING
1530 lab called to draw 2nd cortisol lab.  First phlebotomist unable to get blood, second tried also unable to get blood.  Blood was obtained and sent to lab at 1630.  Dr. Stein notified of delay.  Per verbal order third cortisol lab not needed due to delay in sample time.

## 2020-02-14 NOTE — PROGRESS NOTES
"[facility] Hospital Medicine  Progress Note    Patient Name: Chris Asencio  MRN: [id number]  Patient Class: IP- Inpatient   Admission Date: 2/11/2020  Length of Stay: 1 days  Attending Physician: Darrel Stein MD  Primary Care Provider: Arleen Garcia MD        Subjective:     Principal Problem:Sepsis due to urinary tract infection        HPI:  Per Yaya Mckee, NP:    "The patient is a 88-year-old male history of AFib on Coumadin, hypertension, hyperlipidemia, diabetes with bedsore to the left hip who presents via EMS with complaints of multiple episodes of diarrhea and low blood sugar.  Patient's daughter is his primary caregiver and she reports that patient was in his usual state of health but at 5:00 a.m. this morning he started with very loose watery stools.  She reports he has had a bowel movement approximately every 0.5 hr.  There has been no obvious blood but patient does report that there has been unusual smell with stool.  There is no known sick contacts.  Patient has not tolerated any food or fluids today but this is because of poor appetite.  Patient has no vomiting.  There is no cough or cold symptoms or fever.  Patient's daughter reports home health nurse came to evaluate the patient and determined that his blood sugar was low so she gave the patient a Coke to drink.  The patient's primary provider was consulted via phone by the home health nurse and the patient was instructed to come straight to the emergency department via EMS.  EMS blood sugar was noted to be 125 on arrival patient had hypotension with 70 systolic and received 1 L of lactated Ringer's in route.  Patient adamantly denies any abdominal pain, chest pain, shortness of breath, bleeding, headache, dizziness or confusion.  Patient's daughter does cooperate no complaints and normal affect. Daughter is present throughout entire interview and exam."    Overview/Hospital Course:  Patient is 80 year man who " presents with evidence sepsis secondary urinary tract infection with hypertension.  Patient started broad-spectrum antibiotics.  Patient volume resuscitated intravenous fluids.  Blood pressure improved.  Patient step-down to the floor.  Wound care service consulted for recommendations regarding his wound.  Patient had recurrent episodes of hypoglycemia.  Patient started intravenous dextrose.    Interval History:  No acute events overnight.  Hypoglycemia.    Review of Systems   Constitutional: Negative for chills and fever.   Respiratory: Negative for shortness of breath.    Cardiovascular: Negative for chest pain.   Gastrointestinal: Negative for abdominal pain, constipation, diarrhea, nausea and vomiting.   Neurological: Positive for weakness.     Objective:     Vital Signs (Most Recent):  Temp: 97.9 °F (36.6 °C) (02/13/20 0737)  Pulse: 70 (02/13/20 1000)  Resp: 18 (02/13/20 0737)  BP: 100/61 (02/13/20 0737)  SpO2: 95 % (02/13/20 0737) Vital Signs (24h Range):  Temp:  [97 °F (36.1 °C)-97.9 °F (36.6 °C)] 97.9 °F (36.6 °C)  Pulse:  [] 70  Resp:  [16-22] 18  SpO2:  [94 %-100 %] 95 %  BP: ()/(48-66) 100/61     Weight: 53 kg (116 lb 13.5 oz)  Body mass index is 18.86 kg/m².    Intake/Output Summary (Last 24 hours) at 2/13/2020 1054  Last data filed at 2/13/2020 1032  Gross per 24 hour   Intake 2173 ml   Output 1850 ml   Net 323 ml      Physical Exam   Constitutional: He is oriented to person, place, and time.   Thin significant diffuse muscle wasting.   Eyes:   Blind   Cardiovascular: Normal rate, regular rhythm, normal heart sounds and intact distal pulses. Exam reveals no gallop and no friction rub.   No murmur heard.  Pulmonary/Chest: Effort normal and breath sounds normal. No respiratory distress. He has no wheezes. He has no rales.   Abdominal: Soft. Bowel sounds are normal. He exhibits no distension. There is no tenderness.   Musculoskeletal: Normal range of motion. He exhibits no edema or tenderness.    Neurological: He is alert and oriented to person, place, and time.   Skin: Skin is warm and dry. No rash noted. No erythema. No pallor.   Multiple areas of skin breakdown which do not appear acutely infected.  Dry skin.       Significant Labs: All pertinent labs within the past 24 hours have been reviewed.    Significant Imaging: I have reviewed all pertinent imaging results/findings within the past 24 hours.      Assessment/Plan:      * Sepsis due to urinary tract infection  Clinically improved with empiric antibiotics and with volume resuscitation.  Hypotension resolved.  Blood in urine cultures no growth today.  Continue empiric antibiotics for now.    Skin breakdown  Continue with wound care.      Type II diabetes mellitus with complication  Hypoglycemic on presentation.  Patient continues to have low capillary blood glucose measurements reading.  Will give intravenous dextrose encourage oral intake.  Will assess for adrenal insufficiency.    Atrial fibrillation  Continue warfarin.        VTE Risk Mitigation (From admission, onward)         Ordered     warfarin (COUMADIN) tablet 6 mg  Daily      02/12/20 0009     Place NOVA hose  Until discontinued      02/12/20 0009     Place sequential compression device  Until discontinued      02/12/20 0009     IP VTE HIGH RISK PATIENT  Once      02/12/20 0009     Reason for No Pharmacological VTE Prophylaxis  Once     Question:  Reasons:  Answer:  Already adequately anticoagulated on oral Anticoagulants    02/12/20 0009                      Darrel Stein MD  Department of Hospital Medicine   [facility]

## 2020-02-14 NOTE — PROGRESS NOTES
"[facility] Hospital Medicine  Progress Note    Patient Name: Chris Asencio  MRN: [id number]  Patient Class: IP- Inpatient   Admission Date: 2/11/2020  Length of Stay: 2 days  Attending Physician: Darrel Stein MD  Primary Care Provider: Arleen Garcia MD        Subjective:     Principal Problem:Sepsis due to urinary tract infection        HPI:  Per Yaya Mckee, NP:    "The patient is a 88-year-old male history of AFib on Coumadin, hypertension, hyperlipidemia, diabetes with bedsore to the left hip who presents via EMS with complaints of multiple episodes of diarrhea and low blood sugar.  Patient's daughter is his primary caregiver and she reports that patient was in his usual state of health but at 5:00 a.m. this morning he started with very loose watery stools.  She reports he has had a bowel movement approximately every 0.5 hr.  There has been no obvious blood but patient does report that there has been unusual smell with stool.  There is no known sick contacts.  Patient has not tolerated any food or fluids today but this is because of poor appetite.  Patient has no vomiting.  There is no cough or cold symptoms or fever.  Patient's daughter reports home health nurse came to evaluate the patient and determined that his blood sugar was low so she gave the patient a Coke to drink.  The patient's primary provider was consulted via phone by the home health nurse and the patient was instructed to come straight to the emergency department via EMS.  EMS blood sugar was noted to be 125 on arrival patient had hypotension with 70 systolic and received 1 L of lactated Ringer's in route.  Patient adamantly denies any abdominal pain, chest pain, shortness of breath, bleeding, headache, dizziness or confusion.  Patient's daughter does cooperate no complaints and normal affect. Daughter is present throughout entire interview and exam."    Overview/Hospital Course:  Patient is 80 year man who " presents with evidence sepsis secondary urinary tract infection with hypertension.  Patient started broad-spectrum antibiotics.  Patient volume resuscitated intravenous fluids.  Blood pressure improved.  Patient step-down to the floor.  Wound care service consulted for recommendations regarding his wound.  Patient had recurrent episodes of hypoglycemia.  Patient started intravenous dextrose.    Interval History:  No acute events overnight.  Hypoglycemia resolved however on continuous dextrose infusion.    Review of Systems   Constitutional: Negative for chills and fever.   Respiratory: Negative for shortness of breath.    Cardiovascular: Negative for chest pain.   Gastrointestinal: Negative for abdominal pain, constipation, diarrhea, nausea and vomiting.   Neurological: Positive for weakness.     Objective:     Vital Signs (Most Recent):  Temp: 98.1 °F (36.7 °C) (02/14/20 1148)  Pulse: 85 (02/14/20 1148)  Resp: 20 (02/14/20 1148)  BP: (!) 115/58 (02/14/20 1148)  SpO2: 99 % (02/14/20 1148) Vital Signs (24h Range):  Temp:  [97.6 °F (36.4 °C)-98.1 °F (36.7 °C)] 98.1 °F (36.7 °C)  Pulse:  [60-90] 85  Resp:  [18-20] 20  SpO2:  [90 %-100 %] 99 %  BP: ()/(50-61) 115/58     Weight: 53 kg (116 lb 13.5 oz)  Body mass index is 18.86 kg/m².    Intake/Output Summary (Last 24 hours) at 2/14/2020 1643  Last data filed at 2/14/2020 0745  Gross per 24 hour   Intake 870 ml   Output --   Net 870 ml      Physical Exam   Constitutional: He is oriented to person, place, and time.   Thin significant diffuse muscle wasting.   Eyes:   Blind   Cardiovascular: Normal rate, regular rhythm, normal heart sounds and intact distal pulses. Exam reveals no gallop and no friction rub.   No murmur heard.  Pulmonary/Chest: Effort normal and breath sounds normal. No respiratory distress. He has no wheezes. He has no rales.   Abdominal: Soft. Bowel sounds are normal. He exhibits no distension. There is no tenderness.   Musculoskeletal: Normal range  of motion. He exhibits no edema or tenderness.   Neurological: He is alert and oriented to person, place, and time.   Skin: Skin is warm and dry. No rash noted. No erythema. No pallor.   Multiple areas of skin breakdown which do not appear acutely infected.  Dry skin.       Significant Labs: All pertinent labs within the past 24 hours have been reviewed.    Significant Imaging: I have reviewed all pertinent imaging results/findings within the past 24 hours.      Assessment/Plan:      * Sepsis due to urinary tract infection  Clinically improved with empiric antibiotics and with volume resuscitation.  Hypotension resolved.  Blood cultures no growth.  Urine culture positive for coagulase-negative staph likely a contaminant.  Will repeat urinalysis and urine culture. Continue empiric antibiotics for now.    Skin breakdown  Continue with wound care including recommendations by Podiatry.    Type II diabetes mellitus with complication  Hypoglycemic on presentation.  Patient continues to have low capillary blood glucose measurements reading.  Will give intravenous dextrose encourage oral intake.  Cortisol stimulation test not able to be performed correctly due to difficulty with phlebotomy.  Will repeat testing tomorrow morning.    Atrial fibrillation  Continue warfarin.    VTE Risk Mitigation (From admission, onward)         Ordered     warfarin (COUMADIN) tablet 6 mg  Daily      02/12/20 0009     Place NOVA hose  Until discontinued      02/12/20 0009     Place sequential compression device  Until discontinued      02/12/20 0009     IP VTE HIGH RISK PATIENT  Once      02/12/20 0009     Reason for No Pharmacological VTE Prophylaxis  Once     Question:  Reasons:  Answer:  Already adequately anticoagulated on oral Anticoagulants    02/12/20 0009                      Darrel Stein MD  Department of Hospital Medicine   [facility]

## 2020-02-14 NOTE — PLAN OF CARE
Plan of care reviewed with patient and daughter (via phone). Denies pain, discomfort. Turned and repositioned q 2 hours and PRN. Tolerated IV fluids and antibiotics via right forearm peripheral IV. Feeding assistance given for meals, appetite fair and tolerating Boost shakes well. BG monitoring continues per order. No s/s of hypo/hyperglycemia. Xray of right 2nd toe performed at bedside today. Wound care provided per order. Voiding via external catheter. Small BM noted this shift. Telemetry monitoring continues; pt continues in Afib with periods of bradycardia. MD aware. Safety precautions maintained, call bell in reach. Hourly rounding completed.

## 2020-02-14 NOTE — ASSESSMENT & PLAN NOTE
Hypoglycemic on presentation.  Patient continues to have low capillary blood glucose measurements reading.  Will give intravenous dextrose encourage oral intake.  Cortisol stimulation test not able to be performed correctly due to difficulty with phlebotomy.  Will repeat testing tomorrow morning.

## 2020-02-15 LAB
ALBUMIN SERPL BCP-MCNC: 2.2 G/DL (ref 3.5–5.2)
ALP SERPL-CCNC: 58 U/L (ref 55–135)
ALT SERPL W/O P-5'-P-CCNC: 6 U/L (ref 10–44)
ANION GAP SERPL CALC-SCNC: 6 MMOL/L (ref 8–16)
AST SERPL-CCNC: 12 U/L (ref 10–40)
BACTERIA #/AREA URNS HPF: ABNORMAL /HPF
BASOPHILS # BLD AUTO: 0.01 K/UL (ref 0–0.2)
BASOPHILS NFR BLD: 0.2 % (ref 0–1.9)
BILIRUB SERPL-MCNC: 0.4 MG/DL (ref 0.1–1)
BILIRUB UR QL STRIP: NEGATIVE
BUN SERPL-MCNC: 5 MG/DL (ref 8–23)
CALCIUM SERPL-MCNC: 8 MG/DL (ref 8.7–10.5)
CHLORIDE SERPL-SCNC: 109 MMOL/L (ref 95–110)
CLARITY UR: CLEAR
CO2 SERPL-SCNC: 26 MMOL/L (ref 23–29)
COLOR UR: YELLOW
CORTIS SERPL-MCNC: 17.2 UG/DL
CORTIS SERPL-MCNC: 18.7 UG/DL
CORTIS SERPL-MCNC: 23.6 UG/DL
CREAT SERPL-MCNC: 0.6 MG/DL (ref 0.5–1.4)
DIFFERENTIAL METHOD: ABNORMAL
EOSINOPHIL # BLD AUTO: 0.1 K/UL (ref 0–0.5)
EOSINOPHIL NFR BLD: 1.8 % (ref 0–8)
ERYTHROCYTE [DISTWIDTH] IN BLOOD BY AUTOMATED COUNT: 16.7 % (ref 11.5–14.5)
EST. GFR  (AFRICAN AMERICAN): >60 ML/MIN/1.73 M^2
EST. GFR  (NON AFRICAN AMERICAN): >60 ML/MIN/1.73 M^2
GLUCOSE SERPL-MCNC: 75 MG/DL (ref 70–110)
GLUCOSE UR QL STRIP: NEGATIVE
HCT VFR BLD AUTO: 29.7 % (ref 40–54)
HGB BLD-MCNC: 10 G/DL (ref 14–18)
HGB UR QL STRIP: NEGATIVE
IMM GRANULOCYTES # BLD AUTO: 0.02 K/UL (ref 0–0.04)
IMM GRANULOCYTES NFR BLD AUTO: 0.4 % (ref 0–0.5)
KETONES UR QL STRIP: NEGATIVE
LEUKOCYTE ESTERASE UR QL STRIP: ABNORMAL
LYMPHOCYTES # BLD AUTO: 2.2 K/UL (ref 1–4.8)
LYMPHOCYTES NFR BLD: 45.5 % (ref 18–48)
MAGNESIUM SERPL-MCNC: 1.5 MG/DL (ref 1.6–2.6)
MCH RBC QN AUTO: 27.4 PG (ref 27–31)
MCHC RBC AUTO-ENTMCNC: 33.7 G/DL (ref 32–36)
MCV RBC AUTO: 81 FL (ref 82–98)
MICROSCOPIC COMMENT: ABNORMAL
MONOCYTES # BLD AUTO: 0.4 K/UL (ref 0.3–1)
MONOCYTES NFR BLD: 8.4 % (ref 4–15)
NEUTROPHILS # BLD AUTO: 2.1 K/UL (ref 1.8–7.7)
NEUTROPHILS NFR BLD: 43.7 % (ref 38–73)
NITRITE UR QL STRIP: NEGATIVE
NRBC BLD-RTO: 0 /100 WBC
PH UR STRIP: 7 [PH] (ref 5–8)
PHOSPHATE SERPL-MCNC: 1.5 MG/DL (ref 2.7–4.5)
PLATELET # BLD AUTO: 132 K/UL (ref 150–350)
PMV BLD AUTO: 10.1 FL (ref 9.2–12.9)
POCT GLUCOSE: 102 MG/DL (ref 70–110)
POCT GLUCOSE: 136 MG/DL (ref 70–110)
POCT GLUCOSE: 172 MG/DL (ref 70–110)
POCT GLUCOSE: 88 MG/DL (ref 70–110)
POTASSIUM SERPL-SCNC: 3.7 MMOL/L (ref 3.5–5.1)
PROT SERPL-MCNC: 5.3 G/DL (ref 6–8.4)
PROT UR QL STRIP: NEGATIVE
RBC # BLD AUTO: 3.65 M/UL (ref 4.6–6.2)
RBC #/AREA URNS HPF: 0 /HPF (ref 0–4)
SODIUM SERPL-SCNC: 141 MMOL/L (ref 136–145)
SP GR UR STRIP: <=1.005 (ref 1–1.03)
SQUAMOUS #/AREA URNS HPF: 0 /HPF
URN SPEC COLLECT METH UR: ABNORMAL
UROBILINOGEN UR STRIP-ACNC: NEGATIVE EU/DL
WBC # BLD AUTO: 4.88 K/UL (ref 3.9–12.7)
WBC #/AREA URNS HPF: 3 /HPF (ref 0–5)
WBC CLUMPS URNS QL MICRO: ABNORMAL
YEAST URNS QL MICRO: ABNORMAL

## 2020-02-15 PROCEDURE — 25000003 PHARM REV CODE 250: Performed by: NURSE PRACTITIONER

## 2020-02-15 PROCEDURE — 99233 PR SUBSEQUENT HOSPITAL CARE,LEVL III: ICD-10-PCS | Mod: ,,, | Performed by: HOSPITALIST

## 2020-02-15 PROCEDURE — 63600175 PHARM REV CODE 636 W HCPCS: Performed by: HOSPITALIST

## 2020-02-15 PROCEDURE — 36415 COLL VENOUS BLD VENIPUNCTURE: CPT

## 2020-02-15 PROCEDURE — 11000001 HC ACUTE MED/SURG PRIVATE ROOM

## 2020-02-15 PROCEDURE — 99233 SBSQ HOSP IP/OBS HIGH 50: CPT | Mod: ,,, | Performed by: HOSPITALIST

## 2020-02-15 PROCEDURE — 84100 ASSAY OF PHOSPHORUS: CPT

## 2020-02-15 PROCEDURE — 81000 URINALYSIS NONAUTO W/SCOPE: CPT

## 2020-02-15 PROCEDURE — 85025 COMPLETE CBC W/AUTO DIFF WBC: CPT

## 2020-02-15 PROCEDURE — 25000003 PHARM REV CODE 250: Performed by: HOSPITALIST

## 2020-02-15 PROCEDURE — 63600175 PHARM REV CODE 636 W HCPCS: Performed by: NURSE PRACTITIONER

## 2020-02-15 PROCEDURE — 80053 COMPREHEN METABOLIC PANEL: CPT

## 2020-02-15 PROCEDURE — 83735 ASSAY OF MAGNESIUM: CPT

## 2020-02-15 PROCEDURE — 94761 N-INVAS EAR/PLS OXIMETRY MLT: CPT

## 2020-02-15 PROCEDURE — 82533 TOTAL CORTISOL: CPT | Mod: 91

## 2020-02-15 RX ORDER — MAGNESIUM SULFATE HEPTAHYDRATE 40 MG/ML
2 INJECTION, SOLUTION INTRAVENOUS ONCE
Status: COMPLETED | OUTPATIENT
Start: 2020-02-15 | End: 2020-02-15

## 2020-02-15 RX ORDER — SODIUM,POTASSIUM PHOSPHATES 280-250MG
2 POWDER IN PACKET (EA) ORAL
Status: DISCONTINUED | OUTPATIENT
Start: 2020-02-15 | End: 2020-02-18 | Stop reason: HOSPADM

## 2020-02-15 RX ORDER — COSYNTROPIN 0.25 MG/ML
0.25 INJECTION, POWDER, FOR SOLUTION INTRAMUSCULAR; INTRAVENOUS ONCE
Status: COMPLETED | OUTPATIENT
Start: 2020-02-15 | End: 2020-02-15

## 2020-02-15 RX ADMIN — FOLIC ACID 1 MG: 1 TABLET ORAL at 08:02

## 2020-02-15 RX ADMIN — POTASSIUM & SODIUM PHOSPHATES POWDER PACK 280-160-250 MG 2 PACKET: 280-160-250 PACK at 01:02

## 2020-02-15 RX ADMIN — POTASSIUM & SODIUM PHOSPHATES POWDER PACK 280-160-250 MG 2 PACKET: 280-160-250 PACK at 06:02

## 2020-02-15 RX ADMIN — MAGNESIUM SULFATE IN WATER 2 G: 40 INJECTION, SOLUTION INTRAVENOUS at 01:02

## 2020-02-15 RX ADMIN — CEFTRIAXONE 1 G: 1 INJECTION, SOLUTION INTRAVENOUS at 12:02

## 2020-02-15 RX ADMIN — WARFARIN SODIUM 6 MG: 5 TABLET ORAL at 06:02

## 2020-02-15 RX ADMIN — CYANOCOBALAMIN TAB 1000 MCG 1000 MCG: 1000 TAB at 08:02

## 2020-02-15 RX ADMIN — CEFTRIAXONE 1 G: 1 INJECTION, SOLUTION INTRAVENOUS at 09:02

## 2020-02-15 RX ADMIN — POTASSIUM & SODIUM PHOSPHATES POWDER PACK 280-160-250 MG 2 PACKET: 280-160-250 PACK at 09:02

## 2020-02-15 RX ADMIN — DEXTROSE, SODIUM CHLORIDE, SODIUM LACTATE, POTASSIUM CHLORIDE, AND CALCIUM CHLORIDE: 5; .6; .31; .03; .02 INJECTION, SOLUTION INTRAVENOUS at 06:02

## 2020-02-15 RX ADMIN — COSYNTROPIN 0.25 MG: 0.25 INJECTION, POWDER, LYOPHILIZED, FOR SOLUTION INTRAMUSCULAR; INTRAVENOUS at 08:02

## 2020-02-15 RX ADMIN — COSYNTROPIN 0.25 MG: 0.25 INJECTION, POWDER, LYOPHILIZED, FOR SOLUTION INTRAMUSCULAR; INTRAVENOUS at 02:02

## 2020-02-15 NOTE — PROGRESS NOTES
"[facility] Hospital Medicine  Progress Note    Patient Name: Chris Asencio  MRN: [id number]  Patient Class: IP- Inpatient   Admission Date: 2/11/2020  Length of Stay: 3 days  Attending Physician: Darrel Stein MD  Primary Care Provider: Arleen Garcia MD        Subjective:     Principal Problem:Sepsis due to urinary tract infection        HPI:  Per Yaya Mckee, NP:    "The patient is a 88-year-old male history of AFib on Coumadin, hypertension, hyperlipidemia, diabetes with bedsore to the left hip who presents via EMS with complaints of multiple episodes of diarrhea and low blood sugar.  Patient's daughter is his primary caregiver and she reports that patient was in his usual state of health but at 5:00 a.m. this morning he started with very loose watery stools.  She reports he has had a bowel movement approximately every 0.5 hr.  There has been no obvious blood but patient does report that there has been unusual smell with stool.  There is no known sick contacts.  Patient has not tolerated any food or fluids today but this is because of poor appetite.  Patient has no vomiting.  There is no cough or cold symptoms or fever.  Patient's daughter reports home health nurse came to evaluate the patient and determined that his blood sugar was low so she gave the patient a Coke to drink.  The patient's primary provider was consulted via phone by the home health nurse and the patient was instructed to come straight to the emergency department via EMS.  EMS blood sugar was noted to be 125 on arrival patient had hypotension with 70 systolic and received 1 L of lactated Ringer's in route.  Patient adamantly denies any abdominal pain, chest pain, shortness of breath, bleeding, headache, dizziness or confusion.  Patient's daughter does cooperate no complaints and normal affect. Daughter is present throughout entire interview and exam."    Overview/Hospital Course:  Patient is 80 year man who " presents with evidence sepsis secondary urinary tract infection with hypertension.  Patient started broad-spectrum antibiotics.  Patient volume resuscitated intravenous fluids.  Blood pressure improved.  Patient step-down to the floor.  Wound care service consulted for recommendations regarding his wound.  Patient had recurrent episodes of hypoglycemia.  Patient started intravenous dextrose.  Efforts at performing cortisol stimulation test complicated by difficulty with phlebotomy.    Interval History:  No acute events overnight.  Hypoglycemia resolved however on continuous dextrose infusion.    Review of Systems   Constitutional: Negative for chills and fever.   Respiratory: Negative for shortness of breath.    Cardiovascular: Negative for chest pain.   Gastrointestinal: Negative for abdominal pain, constipation, diarrhea, nausea and vomiting.   Neurological: Positive for weakness.     Objective:     Vital Signs (Most Recent):  Temp: 97.9 °F (36.6 °C) (02/15/20 1139)  Pulse: 91 (02/15/20 1139)  Resp: 19 (02/15/20 1139)  BP: (!) 88/54 (02/15/20 1139)  SpO2: (!) 93 % (02/15/20 1139) Vital Signs (24h Range):  Temp:  [96.9 °F (36.1 °C)-98.3 °F (36.8 °C)] 97.9 °F (36.6 °C)  Pulse:  [48-91] 91  Resp:  [16-20] 19  SpO2:  [93 %-99 %] 93 %  BP: ()/(54-65) 88/54     Weight: 59 kg (130 lb 1.1 oz)  Body mass index is 20.99 kg/m².    Intake/Output Summary (Last 24 hours) at 2/15/2020 1310  Last data filed at 2/15/2020 0830  Gross per 24 hour   Intake 3605 ml   Output 1700 ml   Net 1905 ml      Physical Exam   Constitutional: He is oriented to person, place, and time.   Thin significant diffuse muscle wasting.   Eyes:   Blind   Cardiovascular: Normal rate, regular rhythm, normal heart sounds and intact distal pulses. Exam reveals no gallop and no friction rub.   No murmur heard.  Pulmonary/Chest: Effort normal and breath sounds normal. No respiratory distress. He has no wheezes. He has no rales.   Abdominal: Soft. Bowel  sounds are normal. He exhibits no distension. There is no tenderness.   Musculoskeletal: Normal range of motion. He exhibits no edema or tenderness.   Neurological: He is alert and oriented to person, place, and time.   Skin: Skin is warm and dry. No rash noted. No erythema. No pallor.   Multiple areas of skin breakdown which do not appear acutely infected.  Dry skin.       Significant Labs: All pertinent labs within the past 24 hours have been reviewed.    Significant Imaging: I have reviewed all pertinent imaging results/findings within the past 24 hours.      Assessment/Plan:      * Sepsis due to urinary tract infection  Clinically improved with empiric antibiotics and with volume resuscitation.  Blood pressure improved.  Blood cultures no growth.  Urine culture positive for coagulase-negative staph likely a contaminant.  Repeating urinalysis and urine culture. Continue empiric antibiotics for now.    Skin breakdown  Continue with wound care including recommendations by Podiatry.    Type II diabetes mellitus with complication  Hypoglycemic on presentation.  Patient continues to have low capillary blood glucose measurements readings.  Will continue with intravenous dextrose encourage oral intake.  Cortisol stimulation test not able to be performed correctly due to difficulty with phlebotomy.  I spoke both lab personal and nursing.  Will attempt again this afternoon.    Atrial fibrillation  Continue warfarin.      VTE Risk Mitigation (From admission, onward)         Ordered     warfarin (COUMADIN) tablet 6 mg  Daily      02/12/20 0009     Place NOVA hose  Until discontinued      02/12/20 0009     Place sequential compression device  Until discontinued      02/12/20 0009     IP VTE HIGH RISK PATIENT  Once      02/12/20 0009     Reason for No Pharmacological VTE Prophylaxis  Once     Question:  Reasons:  Answer:  Already adequately anticoagulated on oral Anticoagulants    02/12/20 0009                      Darrel NAVA  MD Sarita  Department of Hospital Medicine   [facility]

## 2020-02-15 NOTE — ASSESSMENT & PLAN NOTE
Clinically improved with empiric antibiotics and with volume resuscitation.  Blood pressure improved.  Blood cultures no growth.  Urine culture positive for coagulase-negative staph likely a contaminant.  Repeating urinalysis and urine culture. Continue empiric antibiotics for now.

## 2020-02-15 NOTE — SUBJECTIVE & OBJECTIVE
Interval History:  No acute events overnight.  Hypoglycemia resolved however on continuous dextrose infusion.    Review of Systems   Constitutional: Negative for chills and fever.   Respiratory: Negative for shortness of breath.    Cardiovascular: Negative for chest pain.   Gastrointestinal: Negative for abdominal pain, constipation, diarrhea, nausea and vomiting.   Neurological: Positive for weakness.     Objective:     Vital Signs (Most Recent):  Temp: 97.9 °F (36.6 °C) (02/15/20 1139)  Pulse: 91 (02/15/20 1139)  Resp: 19 (02/15/20 1139)  BP: (!) 88/54 (02/15/20 1139)  SpO2: (!) 93 % (02/15/20 1139) Vital Signs (24h Range):  Temp:  [96.9 °F (36.1 °C)-98.3 °F (36.8 °C)] 97.9 °F (36.6 °C)  Pulse:  [48-91] 91  Resp:  [16-20] 19  SpO2:  [93 %-99 %] 93 %  BP: ()/(54-65) 88/54     Weight: 59 kg (130 lb 1.1 oz)  Body mass index is 20.99 kg/m².    Intake/Output Summary (Last 24 hours) at 2/15/2020 1310  Last data filed at 2/15/2020 0830  Gross per 24 hour   Intake 3605 ml   Output 1700 ml   Net 1905 ml      Physical Exam   Constitutional: He is oriented to person, place, and time.   Thin significant diffuse muscle wasting.   Eyes:   Blind   Cardiovascular: Normal rate, regular rhythm, normal heart sounds and intact distal pulses. Exam reveals no gallop and no friction rub.   No murmur heard.  Pulmonary/Chest: Effort normal and breath sounds normal. No respiratory distress. He has no wheezes. He has no rales.   Abdominal: Soft. Bowel sounds are normal. He exhibits no distension. There is no tenderness.   Musculoskeletal: Normal range of motion. He exhibits no edema or tenderness.   Neurological: He is alert and oriented to person, place, and time.   Skin: Skin is warm and dry. No rash noted. No erythema. No pallor.   Multiple areas of skin breakdown which do not appear acutely infected.  Dry skin.       Significant Labs: All pertinent labs within the past 24 hours have been reviewed.    Significant Imaging: I have  reviewed all pertinent imaging results/findings within the past 24 hours.

## 2020-02-15 NOTE — PLAN OF CARE
POC reviewed with pt who verbalized understanding. AAOx3. VSS on RA. Remains free of falls and injury. IVF infusing throughout shift. No complaints of pain or nausea. Tele monitored running afib. BG monitored at bedtime with no coverage needed per MAR. Condom cath in place. Left him and Right toe dressings in place. No acute events. No distress noted. Call light in reach. Frequent rounds made for safety. Will continue to monitor.

## 2020-02-15 NOTE — PLAN OF CARE
Plan of care reviewed with patient and daughter (via phone). Denies pain, discomfort. Turned and repositioned q 2 hours and PRN. Tolerated IV fluids and antibiotics via right forearm peripheral IV. Feeding assistance given for meals, appetite fair and tolerating Boost shakes well. BG monitoring continues per order. No s/s of hypo/hyperglycemia. Wound care provided per order. Voiding via external catheter. Telemetry monitoring continues; pt continues in Afib with periods of bradycardia. MD aware. Safety precautions maintained, call bell in reach. Hourly rounding completed.

## 2020-02-15 NOTE — ASSESSMENT & PLAN NOTE
Hypoglycemic on presentation.  Patient continues to have low capillary blood glucose measurements readings.  Will continue with intravenous dextrose encourage oral intake.  Cortisol stimulation test not able to be performed correctly due to difficulty with phlebotomy.  I spoke both lab personal and nursing.  Will attempt again this afternoon.

## 2020-02-16 LAB
ALBUMIN SERPL BCP-MCNC: 2.1 G/DL (ref 3.5–5.2)
ALP SERPL-CCNC: 54 U/L (ref 55–135)
ALT SERPL W/O P-5'-P-CCNC: 6 U/L (ref 10–44)
ANION GAP SERPL CALC-SCNC: 5 MMOL/L (ref 8–16)
AST SERPL-CCNC: 12 U/L (ref 10–40)
BASOPHILS # BLD AUTO: 0.02 K/UL (ref 0–0.2)
BASOPHILS NFR BLD: 0.3 % (ref 0–1.9)
BILIRUB SERPL-MCNC: 0.3 MG/DL (ref 0.1–1)
BUN SERPL-MCNC: 5 MG/DL (ref 8–23)
CALCIUM SERPL-MCNC: 8 MG/DL (ref 8.7–10.5)
CHLORIDE SERPL-SCNC: 109 MMOL/L (ref 95–110)
CO2 SERPL-SCNC: 27 MMOL/L (ref 23–29)
CREAT SERPL-MCNC: 0.6 MG/DL (ref 0.5–1.4)
DIFFERENTIAL METHOD: ABNORMAL
EOSINOPHIL # BLD AUTO: 0.1 K/UL (ref 0–0.5)
EOSINOPHIL NFR BLD: 1.3 % (ref 0–8)
ERYTHROCYTE [DISTWIDTH] IN BLOOD BY AUTOMATED COUNT: 16.7 % (ref 11.5–14.5)
EST. GFR  (AFRICAN AMERICAN): >60 ML/MIN/1.73 M^2
EST. GFR  (NON AFRICAN AMERICAN): >60 ML/MIN/1.73 M^2
GLUCOSE SERPL-MCNC: 94 MG/DL (ref 70–110)
HCT VFR BLD AUTO: 28 % (ref 40–54)
HGB BLD-MCNC: 9.1 G/DL (ref 14–18)
IMM GRANULOCYTES # BLD AUTO: 0.03 K/UL (ref 0–0.04)
IMM GRANULOCYTES NFR BLD AUTO: 0.5 % (ref 0–0.5)
INR PPP: 1.4 (ref 0.8–1.2)
LYMPHOCYTES # BLD AUTO: 2.4 K/UL (ref 1–4.8)
LYMPHOCYTES NFR BLD: 38.8 % (ref 18–48)
MAGNESIUM SERPL-MCNC: 1.8 MG/DL (ref 1.6–2.6)
MCH RBC QN AUTO: 26.6 PG (ref 27–31)
MCHC RBC AUTO-ENTMCNC: 32.5 G/DL (ref 32–36)
MCV RBC AUTO: 82 FL (ref 82–98)
MONOCYTES # BLD AUTO: 0.7 K/UL (ref 0.3–1)
MONOCYTES NFR BLD: 10.6 % (ref 4–15)
NEUTROPHILS # BLD AUTO: 3 K/UL (ref 1.8–7.7)
NEUTROPHILS NFR BLD: 48.5 % (ref 38–73)
NRBC BLD-RTO: 0 /100 WBC
PHOSPHATE SERPL-MCNC: 2 MG/DL (ref 2.7–4.5)
PLATELET # BLD AUTO: 151 K/UL (ref 150–350)
PMV BLD AUTO: 10.3 FL (ref 9.2–12.9)
POCT GLUCOSE: 120 MG/DL (ref 70–110)
POCT GLUCOSE: 91 MG/DL (ref 70–110)
POTASSIUM SERPL-SCNC: 3.5 MMOL/L (ref 3.5–5.1)
PROT SERPL-MCNC: 5 G/DL (ref 6–8.4)
PROTHROMBIN TIME: 15.3 SEC (ref 9–12.5)
RBC # BLD AUTO: 3.42 M/UL (ref 4.6–6.2)
SODIUM SERPL-SCNC: 141 MMOL/L (ref 136–145)
WBC # BLD AUTO: 6.11 K/UL (ref 3.9–12.7)

## 2020-02-16 PROCEDURE — 84100 ASSAY OF PHOSPHORUS: CPT

## 2020-02-16 PROCEDURE — 85025 COMPLETE CBC W/AUTO DIFF WBC: CPT

## 2020-02-16 PROCEDURE — 97530 THERAPEUTIC ACTIVITIES: CPT

## 2020-02-16 PROCEDURE — 36415 COLL VENOUS BLD VENIPUNCTURE: CPT

## 2020-02-16 PROCEDURE — 80053 COMPREHEN METABOLIC PANEL: CPT

## 2020-02-16 PROCEDURE — 99233 PR SUBSEQUENT HOSPITAL CARE,LEVL III: ICD-10-PCS | Mod: ,,, | Performed by: HOSPITALIST

## 2020-02-16 PROCEDURE — 63600175 PHARM REV CODE 636 W HCPCS: Performed by: HOSPITALIST

## 2020-02-16 PROCEDURE — 99233 SBSQ HOSP IP/OBS HIGH 50: CPT | Mod: ,,, | Performed by: HOSPITALIST

## 2020-02-16 PROCEDURE — 11000001 HC ACUTE MED/SURG PRIVATE ROOM

## 2020-02-16 PROCEDURE — 94761 N-INVAS EAR/PLS OXIMETRY MLT: CPT

## 2020-02-16 PROCEDURE — 83735 ASSAY OF MAGNESIUM: CPT

## 2020-02-16 PROCEDURE — 25000003 PHARM REV CODE 250: Performed by: NURSE PRACTITIONER

## 2020-02-16 PROCEDURE — 25000003 PHARM REV CODE 250: Performed by: HOSPITALIST

## 2020-02-16 PROCEDURE — 85610 PROTHROMBIN TIME: CPT

## 2020-02-16 RX ADMIN — DEXTROSE, SODIUM CHLORIDE, SODIUM LACTATE, POTASSIUM CHLORIDE, AND CALCIUM CHLORIDE: 5; .6; .31; .03; .02 INJECTION, SOLUTION INTRAVENOUS at 04:02

## 2020-02-16 RX ADMIN — POTASSIUM & SODIUM PHOSPHATES POWDER PACK 280-160-250 MG 2 PACKET: 280-160-250 PACK at 04:02

## 2020-02-16 RX ADMIN — FOLIC ACID 1 MG: 1 TABLET ORAL at 10:02

## 2020-02-16 RX ADMIN — WARFARIN SODIUM 6 MG: 5 TABLET ORAL at 04:02

## 2020-02-16 RX ADMIN — POTASSIUM & SODIUM PHOSPHATES POWDER PACK 280-160-250 MG 2 PACKET: 280-160-250 PACK at 01:02

## 2020-02-16 RX ADMIN — CYANOCOBALAMIN TAB 1000 MCG 1000 MCG: 1000 TAB at 10:02

## 2020-02-16 RX ADMIN — POTASSIUM & SODIUM PHOSPHATES POWDER PACK 280-160-250 MG 2 PACKET: 280-160-250 PACK at 08:02

## 2020-02-16 RX ADMIN — POTASSIUM & SODIUM PHOSPHATES POWDER PACK 280-160-250 MG 2 PACKET: 280-160-250 PACK at 10:02

## 2020-02-16 NOTE — ASSESSMENT & PLAN NOTE
Hypoglycemic on presentation. ACTH stimulation test resulted in serum concentration > 20 mcg/dL at 60 minutes speaking against adrenal insufficieny.  Will discontinue intravenous dextrose and monitor for recurrence of hypoglycemia.

## 2020-02-16 NOTE — PT/OT/SLP PROGRESS
Physical Therapy Treatment    Patient Name:  Chris Asencio   MRN:  [id number]    Recommendations:     Discharge Recommendations:  home   Discharge Equipment Recommendations: (TBD-discuss jam use with family)   Barriers to discharge: None    Assessment:     Chris Asencio is a 88 y.o. male admitted with a medical diagnosis of Sepsis due to urinary tract infection.  He presents with the following impairments/functional limitations:  weakness, impaired functional mobilty, decreased safety awareness, impaired self care skills, decreased lower extremity function, impaired muscle length, impaired joint extensibility, decreased ROM, impaired coordination, impaired fine motor, impaired balance, impaired skin .Pt  with functional mobility deficits and should benefit from  PT  to maximize I and safety decrease risk of further decline of injury.    Rehab Prognosis: Fair; patient would benefit from acute skilled PT services to address these deficits and reach maximum level of function.    Recent Surgery: * No surgery found *      Plan:     During this hospitalization, patient to be seen 3 x/week to address the identified rehab impairments via therapeutic activities, therapeutic exercises, neuromuscular re-education and progress toward the following goals:    · Plan of Care Expires:  03/12/20    Subjective     Chief Complaint: pt without c/o  Patient/Family Comments/goals: none stated  Pain/Comfort:  · Pain Rating 1: 0/10      Objective:     Communicated with nursing prior to session.  Patient found supine with Condom Catheter, peripheral IV upon PT entry to room.     General Precautions: Standard, fall   Orthopedic Precautions:N/A   Braces: N/A     Functional Mobility:  · Bed Mobility:     · Supine to Sit: minimum assistance  · Sit to Supine: minimum assistance  · Transfers:     · Sit to partial stand:  moderate assistance with no AD      AM-PAC 6 CLICK MOBILITY  Turning over in bed (including adjusting bedclothes, sheets and  blankets)?: 3  Sitting down on and standing up from a chair with arms (e.g., wheelchair, bedside commode, etc.): 2  Moving from lying on back to sitting on the side of the bed?: 3  Moving to and from a bed to a chair (including a wheelchair)?: 2  Need to walk in hospital room?: 1  Climbing 3-5 steps with a railing?: 1  Basic Mobility Total Score: 12       Therapeutic Activities and Exercises:   Pt presented supine in bed. Supine<> sit min A at trunk.   Pt performed LAQ, HF & AP x10 while seated EOB with supervision. 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  Pt return to supine, positioned for comfort and pressure relief    Patient left supine with all lines intact..    GOALS:   Multidisciplinary Problems     Physical Therapy Goals        Problem: Physical Therapy Goal    Goal Priority Disciplines Outcome Goal Variances Interventions   Physical Therapy Goal     PT, PT/OT Ongoing, Progressing     Description:  Goals to be met by: 2020    Patient will increase functional independence with mobility by performin. Sit<>stand/squat with min A with no AD.  2. Supine<>sit with min A.  met 2020  3. Squat pivot EOB <>BSC with mod A                     Time Tracking:     PT Received On: 20  PT Start Time: 837     PT Stop Time: 08  PT Total Time (min): 17 min     Billable Minutes: Therapeutic Activity 17    Treatment Type: Treatment  PT/PTA: PT     PTA Visit Number: Chucho     Vivek Schuster, SHANKAR  2020

## 2020-02-16 NOTE — PLAN OF CARE
Pt in bed, no noted acute distress, no complaints of pain, feeding assistance provided  condom cath in place and draining, BG monitored as ordered, no injuries or falls during shift, AAO x3, bed in low locked position, call light in reach, hourly rounds complete, will continue to assess

## 2020-02-16 NOTE — PLAN OF CARE
Problem: Physical Therapy Goal  Goal: Physical Therapy Goal  Description  Goals to be met by: 2020    Patient will increase functional independence with mobility by performin. Sit<>stand/squat with min A with no AD.  2. Supine<>sit with min A.  met 2020  3. Squat pivot EOB <>BSC with mod A    2020 0917 by Vivek Schuster, PT  Outcome: Ongoing, Progressing  2020 0916 by Vivek Schuster, PT  Outcome: Ongoing, Progressing   Pt presented supine in bed. Supine<> sit min A at trunk.   Pt performed LAQ, HF & AP x10 while seated EOB with supervision. 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  Pt return to supine, positioned for comfort and pressure relief

## 2020-02-16 NOTE — PLAN OF CARE
Plan of care reviewed with patient and daughter (via phone). Denies pain, discomfort. Turned and repositioned q 2 hours and PRN. Tolerated IV fluids via right forearm peripheral IV. Feeding assistance given for meals, appetite fair and tolerating Boost shakes well. BG monitoring continues per order. No s/s of hypo/hyperglycemia. Wound care provided per order. Voiding via external catheter. Telemetry monitoring continues; pt continues in Afib with periods of bradycardia. MD aware. Wound care performed today.  Safety precautions maintained, call bell in reach. Hourly rounding completed.

## 2020-02-16 NOTE — ASSESSMENT & PLAN NOTE
Clinically improved with empiric antibiotics and with volume resuscitation.  Blood pressure improved.  Blood cultures no growth.  Urine culture positive for coagulase-negative staph likely a contaminant.  Repeat urinalysis nitrate negative and with only 3 white blood cells.  Will discontinue further antibiotic therapy and monitor for signs/symptoms of infection.

## 2020-02-16 NOTE — SUBJECTIVE & OBJECTIVE
Interval History:  No acute events overnight.    Review of Systems   Constitutional: Negative for chills and fever.   Respiratory: Negative for shortness of breath.    Cardiovascular: Negative for chest pain.   Gastrointestinal: Negative for abdominal pain, constipation, diarrhea, nausea and vomiting.   Neurological: Positive for weakness.     Objective:     Vital Signs (Most Recent):  Temp: 98.2 °F (36.8 °C) (02/16/20 0816)  Pulse: 90 (02/16/20 1400)  Resp: 18 (02/16/20 0816)  BP: (!) 98/54 (02/16/20 0816)  SpO2: 99 % (02/16/20 0816) Vital Signs (24h Range):  Temp:  [97.7 °F (36.5 °C)-98.4 °F (36.9 °C)] 98.2 °F (36.8 °C)  Pulse:  [] 90  Resp:  [18-20] 18  SpO2:  [93 %-99 %] 99 %  BP: ()/(54-61) 98/54     Weight: 59 kg (130 lb 1.1 oz)  Body mass index is 20.99 kg/m².    Intake/Output Summary (Last 24 hours) at 2/16/2020 1403  Last data filed at 2/16/2020 0745  Gross per 24 hour   Intake 200 ml   Output 650 ml   Net -450 ml      Physical Exam   Constitutional: He is oriented to person, place, and time.   Thin significant diffuse muscle wasting.   Eyes:   Blind   Cardiovascular: Normal rate, regular rhythm, normal heart sounds and intact distal pulses. Exam reveals no gallop and no friction rub.   No murmur heard.  Pulmonary/Chest: Effort normal and breath sounds normal. No respiratory distress. He has no wheezes. He has no rales.   Abdominal: Soft. Bowel sounds are normal. He exhibits no distension. There is no tenderness.   Musculoskeletal: Normal range of motion. He exhibits no edema or tenderness.   Neurological: He is alert and oriented to person, place, and time.   Skin: Skin is warm and dry. No rash noted. No erythema. No pallor.   Multiple areas of skin breakdown which do not appear acutely infected.  Dry skin.       Significant Labs: All pertinent labs within the past 24 hours have been reviewed.    Significant Imaging: I have reviewed all pertinent imaging results/findings within the past 24  hours.

## 2020-02-16 NOTE — PROGRESS NOTES
"[facility] Hospital Medicine  Progress Note    Patient Name: Chris Asencio  MRN: [id number]  Patient Class: IP- Inpatient   Admission Date: 2/11/2020  Length of Stay: 4 days  Attending Physician: Darrel Stein MD  Primary Care Provider: Arleen Garcia MD        Subjective:     Principal Problem:Sepsis due to urinary tract infection        HPI:  Per Yaya Mckee, NP:    "The patient is a 88-year-old male history of AFib on Coumadin, hypertension, hyperlipidemia, diabetes with bedsore to the left hip who presents via EMS with complaints of multiple episodes of diarrhea and low blood sugar.  Patient's daughter is his primary caregiver and she reports that patient was in his usual state of health but at 5:00 a.m. this morning he started with very loose watery stools.  She reports he has had a bowel movement approximately every 0.5 hr.  There has been no obvious blood but patient does report that there has been unusual smell with stool.  There is no known sick contacts.  Patient has not tolerated any food or fluids today but this is because of poor appetite.  Patient has no vomiting.  There is no cough or cold symptoms or fever.  Patient's daughter reports home health nurse came to evaluate the patient and determined that his blood sugar was low so she gave the patient a Coke to drink.  The patient's primary provider was consulted via phone by the home health nurse and the patient was instructed to come straight to the emergency department via EMS.  EMS blood sugar was noted to be 125 on arrival patient had hypotension with 70 systolic and received 1 L of lactated Ringer's in route.  Patient adamantly denies any abdominal pain, chest pain, shortness of breath, bleeding, headache, dizziness or confusion.  Patient's daughter does cooperate no complaints and normal affect. Daughter is present throughout entire interview and exam."    Overview/Hospital Course:  Patient is 80 year man who " presents with evidence sepsis secondary urinary tract infection with hypertension.  Patient started broad-spectrum antibiotics.  Patient volume resuscitated intravenous fluids.  Blood pressure improved.  Patient step-down to the floor.  Wound care service consulted for recommendations regarding his wound.  Patient had recurrent episodes of hypoglycemia.  Patient started intravenous dextrose.  ACTH stimulation test resulted in serum concentration > 20 mcg/dL at 60 minutes speaking against adrenal insufficieny.     Interval History:  No acute events overnight.    Review of Systems   Constitutional: Negative for chills and fever.   Respiratory: Negative for shortness of breath.    Cardiovascular: Negative for chest pain.   Gastrointestinal: Negative for abdominal pain, constipation, diarrhea, nausea and vomiting.   Neurological: Positive for weakness.     Objective:     Vital Signs (Most Recent):  Temp: 98.2 °F (36.8 °C) (02/16/20 0816)  Pulse: 90 (02/16/20 1400)  Resp: 18 (02/16/20 0816)  BP: (!) 98/54 (02/16/20 0816)  SpO2: 99 % (02/16/20 0816) Vital Signs (24h Range):  Temp:  [97.7 °F (36.5 °C)-98.4 °F (36.9 °C)] 98.2 °F (36.8 °C)  Pulse:  [] 90  Resp:  [18-20] 18  SpO2:  [93 %-99 %] 99 %  BP: ()/(54-61) 98/54     Weight: 59 kg (130 lb 1.1 oz)  Body mass index is 20.99 kg/m².    Intake/Output Summary (Last 24 hours) at 2/16/2020 1403  Last data filed at 2/16/2020 0745  Gross per 24 hour   Intake 200 ml   Output 650 ml   Net -450 ml      Physical Exam   Constitutional: He is oriented to person, place, and time.   Thin significant diffuse muscle wasting.   Eyes:   Blind   Cardiovascular: Normal rate, regular rhythm, normal heart sounds and intact distal pulses. Exam reveals no gallop and no friction rub.   No murmur heard.  Pulmonary/Chest: Effort normal and breath sounds normal. No respiratory distress. He has no wheezes. He has no rales.   Abdominal: Soft. Bowel sounds are normal. He exhibits no  distension. There is no tenderness.   Musculoskeletal: Normal range of motion. He exhibits no edema or tenderness.   Neurological: He is alert and oriented to person, place, and time.   Skin: Skin is warm and dry. No rash noted. No erythema. No pallor.   Multiple areas of skin breakdown which do not appear acutely infected.  Dry skin.       Significant Labs: All pertinent labs within the past 24 hours have been reviewed.    Significant Imaging: I have reviewed all pertinent imaging results/findings within the past 24 hours.      Assessment/Plan:      * Sepsis due to urinary tract infection  Clinically improved with empiric antibiotics and with volume resuscitation.  Blood pressure improved.  Blood cultures no growth.  Urine culture positive for coagulase-negative staph likely a contaminant.  Repeat urinalysis nitrate negative and with only 3 white blood cells.  Will discontinue further antibiotic therapy and monitor for signs/symptoms of infection.    Skin breakdown  Continue with wound care including recommendations by Podiatry.    Type II diabetes mellitus with complication  Hypoglycemic on presentation. ACTH stimulation test resulted in serum concentration > 20 mcg/dL at 60 minutes speaking against adrenal insufficieny.  Will discontinue intravenous dextrose and monitor for recurrence of hypoglycemia.    Atrial fibrillation  Continue warfarin.      VTE Risk Mitigation (From admission, onward)         Ordered     warfarin (COUMADIN) tablet 6 mg  Daily      02/12/20 0009     Place NOVA hose  Until discontinued      02/12/20 0009     Place sequential compression device  Until discontinued      02/12/20 0009     IP VTE HIGH RISK PATIENT  Once      02/12/20 0009     Reason for No Pharmacological VTE Prophylaxis  Once     Question:  Reasons:  Answer:  Already adequately anticoagulated on oral Anticoagulants    02/12/20 0009                      Darrel Stein MD  Department of Hospital Medicine   [facility]

## 2020-02-17 PROBLEM — E16.2 HYPOGLYCEMIA: Status: ACTIVE | Noted: 2020-02-17

## 2020-02-17 LAB
BACTERIA BLD CULT: NORMAL
BACTERIA BLD CULT: NORMAL
INR PPP: 1.5 (ref 0.8–1.2)
POCT GLUCOSE: 111 MG/DL (ref 70–110)
POCT GLUCOSE: 71 MG/DL (ref 70–110)
POCT GLUCOSE: 81 MG/DL (ref 70–110)
POCT GLUCOSE: 97 MG/DL (ref 70–110)
PROTHROMBIN TIME: 16.2 SEC (ref 9–12.5)

## 2020-02-17 PROCEDURE — 25000003 PHARM REV CODE 250: Performed by: NURSE PRACTITIONER

## 2020-02-17 PROCEDURE — 99233 PR SUBSEQUENT HOSPITAL CARE,LEVL III: ICD-10-PCS | Mod: ,,, | Performed by: HOSPITALIST

## 2020-02-17 PROCEDURE — 94761 N-INVAS EAR/PLS OXIMETRY MLT: CPT

## 2020-02-17 PROCEDURE — 99233 SBSQ HOSP IP/OBS HIGH 50: CPT | Mod: ,,, | Performed by: HOSPITALIST

## 2020-02-17 PROCEDURE — 97803 MED NUTRITION INDIV SUBSEQ: CPT

## 2020-02-17 PROCEDURE — 85610 PROTHROMBIN TIME: CPT

## 2020-02-17 PROCEDURE — 11000001 HC ACUTE MED/SURG PRIVATE ROOM

## 2020-02-17 PROCEDURE — 25000003 PHARM REV CODE 250: Performed by: HOSPITALIST

## 2020-02-17 PROCEDURE — 36415 COLL VENOUS BLD VENIPUNCTURE: CPT

## 2020-02-17 RX ADMIN — WARFARIN SODIUM 6 MG: 5 TABLET ORAL at 06:02

## 2020-02-17 RX ADMIN — POTASSIUM & SODIUM PHOSPHATES POWDER PACK 280-160-250 MG 2 PACKET: 280-160-250 PACK at 01:02

## 2020-02-17 RX ADMIN — POTASSIUM & SODIUM PHOSPHATES POWDER PACK 280-160-250 MG 2 PACKET: 280-160-250 PACK at 10:02

## 2020-02-17 RX ADMIN — CYANOCOBALAMIN TAB 1000 MCG 1000 MCG: 1000 TAB at 08:02

## 2020-02-17 RX ADMIN — POTASSIUM & SODIUM PHOSPHATES POWDER PACK 280-160-250 MG 2 PACKET: 280-160-250 PACK at 08:02

## 2020-02-17 RX ADMIN — POTASSIUM & SODIUM PHOSPHATES POWDER PACK 280-160-250 MG 2 PACKET: 280-160-250 PACK at 06:02

## 2020-02-17 RX ADMIN — FOLIC ACID 1 MG: 1 TABLET ORAL at 08:02

## 2020-02-17 NOTE — PLAN OF CARE
Pt resting comfortably.  Pt slept most of shift.  Excellent appetite at mealtimes.  No complaints of pain.  Vital signs and CBG WNL.  No complaints of pain.  Wound care performed as ordered.  Safety measures in place.  Purposeful hourly rounding completed.  Will continue to monitor.

## 2020-02-17 NOTE — PROGRESS NOTES
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  Patient's daughter is his primary caregiver and she reports that patient was in his usual state of health but at 5:00 a.m. this morning he started with very loose watery stools.  She reports he has had a bowel movement approximately every 0.5 hr.  There has been no obvious blood but patient does report that there has been unusual smell with stool.  There is no known sick contacts.  Patient has not tolerated any food or fluids today but this is because of poor appetite.  Patient has no vomiting.  There is no cough or cold symptoms or fever.  Patient's daughter reports home health nurse came to evaluate the patient and determined that his blood sugar was low so she gave the patient a Coke to drink.  The patient's primary provider was consulted via phone by the home health nurse and the patient was instructed to come straight to the emergency department via EMS.  EMS blood sugar was noted to be 125 on arrival patient had hypotension with 70 systolic and received 1 L of lactated Ringer's in route.  Patient adamantly denies any abdominal pain, chest pain, shortness of breath, bleeding, headache, dizziness or confusion.  Patient's daughter does cooperate no complaints and normal affect. Daughter is present throughout entire interview and exam."    Overview/Hospital Course:  Patient is 80 year man who " presents with evidence sepsis secondary urinary tract infection with hypertension.  Patient started broad-spectrum antibiotics.  Patient volume resuscitated intravenous fluids.  Blood pressure improved.  Patient step-down to the floor.  Wound care service consulted for recommendations regarding his wound.  Patient had recurrent episodes of hypoglycemia.  Patient started intravenous dextrose.  ACTH stimulation test resulted in serum concentration > 20 mcg/dL at 60 minutes speaking against adrenal insufficieny.     Interval History:  Low capillary blood glucose measurement this morning with glucose of 71 mg/dL but otherwise not symptomatic.    Review of Systems   Constitutional: Negative for chills and fever.   Respiratory: Negative for shortness of breath.    Cardiovascular: Negative for chest pain.   Gastrointestinal: Negative for abdominal pain, constipation, diarrhea, nausea and vomiting.   Neurological: Positive for weakness.     Objective:     Vital Signs (Most Recent):  Temp: 98.3 °F (36.8 °C) (02/17/20 1209)  Pulse: 88 (02/17/20 1400)  Resp: 18 (02/17/20 1209)  BP: 110/64 (02/17/20 1209)  SpO2: 99 % (02/17/20 1209) Vital Signs (24h Range):  Temp:  [98 °F (36.7 °C)-98.4 °F (36.9 °C)] 98.3 °F (36.8 °C)  Pulse:  [] 88  Resp:  [16-19] 18  SpO2:  [96 %-99 %] 99 %  BP: ()/(55-74) 110/64     Weight: 59 kg (130 lb 1.1 oz)  Body mass index is 20.99 kg/m².    Intake/Output Summary (Last 24 hours) at 2/17/2020 1557  Last data filed at 2/17/2020 0600  Gross per 24 hour   Intake --   Output 1050 ml   Net -1050 ml      Physical Exam   Constitutional: He is oriented to person, place, and time.   Thin significant diffuse muscle wasting.   Eyes:   Blind   Cardiovascular: Normal rate, regular rhythm, normal heart sounds and intact distal pulses. Exam reveals no gallop and no friction rub.   No murmur heard.  Pulmonary/Chest: Effort normal and breath sounds normal. No respiratory distress. He has no wheezes. He has  no rales.   Abdominal: Soft. Bowel sounds are normal. He exhibits no distension. There is no tenderness.   Musculoskeletal: Normal range of motion. He exhibits no edema or tenderness.   Neurological: He is alert and oriented to person, place, and time.   Skin: Skin is warm and dry. No rash noted. No erythema. No pallor.   Multiple areas of skin breakdown which do not appear acutely infected.  Dry skin.       Significant Labs: All pertinent labs within the past 24 hours have been reviewed.    Significant Imaging: I have reviewed all pertinent imaging results/findings within the past 24 hours.      Assessment/Plan:      * Sepsis due to urinary tract infection  Clinically improved with empiric antibiotics and with volume resuscitation.  Blood pressure improved.  Blood cultures no growth.  Urine culture positive for coagulase-negative staph likely a contaminant.  Repeat urinalysis nitrate negative and with only 3 white blood cells.  Discontinued further antibiotic therapy and monitoring for signs/symptoms of infection.  Thus far without signs/symptoms of infection.    Hypoglycemia  Case discussed with Dr. Too Holcomb (endocrinology).  Reviewed results of cosyntropin stimulation testing and results are not consistent with adrenal insufficiency.  Dr. Holcomb suspects patient might have falsely low capillary glucose test results.  Recommendation is to monitor off any intravenous dextrose for evidence of recurrent hypoglycemia.  If patient has low capillary blood glucose level, plan to check serum glucose, insulin, c-ceptide, proinsulin, and beta-hydroxybutyrate.      Skin breakdown  Continue with wound care including recommendations by Podiatry.    Type II diabetes mellitus with complication  Discontinue intravenous dextrose and monitoring for episodes of hypoglycemia.    Atrial fibrillation  INR trending up.  INR 1.5 today.  Continue warfarin.      VTE Risk Mitigation (From admission, onward)         Ordered      warfarin (COUMADIN) tablet 6 mg  Daily      02/12/20 0009     Place NOVA hose  Until discontinued      02/12/20 0009     Place sequential compression device  Until discontinued      02/12/20 0009     IP VTE HIGH RISK PATIENT  Once      02/12/20 0009     Reason for No Pharmacological VTE Prophylaxis  Once     Question:  Reasons:  Answer:  Already adequately anticoagulated on oral Anticoagulants    02/12/20 0009              Darrel Stein MD  Department of Hospital Medicine   [facility]

## 2020-02-17 NOTE — PROGRESS NOTES
Wound care performed as ordered.  Hip wound dressing removed.  Wound cleaned with wound cleanser and patted dry.  Wound coated with Medihoney and Mepilex applied.  Bordered gauze dressing removed from right foot, second toe.  Toe coated with betadine.  Bordered gauze dressing applied.  Pt tolerated wound care well.

## 2020-02-17 NOTE — PHYSICIAN QUERY
PT Name: Chris Asencio  MR #: [id number]    Physician Query Form - Consultant Diagnosis Clarification     CDS: Magdalene Deal RN, CCDS         Contact information :[phone]  [email or web address]     This form is a permanent document in the medical record.     Query Date: February 17, 2020      By submitting this query, we are merely seeking further clarification of documentation.  Please utilize your independent clinical judgment when addressing the question(s) below.      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        Pt has decreased appetite x 3 months. Severe change in weight, -50 lbs x 10 months. -29 % weight loss x 10 months. NFPE 2.13.20- severe depletion of L/E's and clavicle. No edema noted.    Signs and Symptoms (as evidenced by):  Energy Intake: <75% of estimated energy requirement x 3 months  Body Fat Depletion: moderate depletion of orbitals, triceps and thoracic   Muscle Mass Depletion: severe depletion of clavicle region, scapular region and lower extremities  Weight Loss: greater than 20% in 1 year ( -29 % weight loss x 10 months)      RD consult 2/17/20      Wound care Consult 2/12/20          Hospital Medicine PN 2/13/20      RD consult 2/17/20         Do you agree with the Consultants diagnosis of Severe malnutrition in the context of chronic illness ?    [ X  ] Yes   [   ] No   [   ] Other/Clarification of findings:   [  ] Clinically undetermined

## 2020-02-17 NOTE — SUBJECTIVE & OBJECTIVE
Interval History:  Low capillary blood glucose measurement this morning with glucose of 71 mg/dL but otherwise not symptomatic.    Review of Systems   Constitutional: Negative for chills and fever.   Respiratory: Negative for shortness of breath.    Cardiovascular: Negative for chest pain.   Gastrointestinal: Negative for abdominal pain, constipation, diarrhea, nausea and vomiting.   Neurological: Positive for weakness.     Objective:     Vital Signs (Most Recent):  Temp: 98.3 °F (36.8 °C) (02/17/20 1209)  Pulse: 88 (02/17/20 1400)  Resp: 18 (02/17/20 1209)  BP: 110/64 (02/17/20 1209)  SpO2: 99 % (02/17/20 1209) Vital Signs (24h Range):  Temp:  [98 °F (36.7 °C)-98.4 °F (36.9 °C)] 98.3 °F (36.8 °C)  Pulse:  [] 88  Resp:  [16-19] 18  SpO2:  [96 %-99 %] 99 %  BP: ()/(55-74) 110/64     Weight: 59 kg (130 lb 1.1 oz)  Body mass index is 20.99 kg/m².    Intake/Output Summary (Last 24 hours) at 2/17/2020 1557  Last data filed at 2/17/2020 0600  Gross per 24 hour   Intake --   Output 1050 ml   Net -1050 ml      Physical Exam   Constitutional: He is oriented to person, place, and time.   Thin significant diffuse muscle wasting.   Eyes:   Blind   Cardiovascular: Normal rate, regular rhythm, normal heart sounds and intact distal pulses. Exam reveals no gallop and no friction rub.   No murmur heard.  Pulmonary/Chest: Effort normal and breath sounds normal. No respiratory distress. He has no wheezes. He has no rales.   Abdominal: Soft. Bowel sounds are normal. He exhibits no distension. There is no tenderness.   Musculoskeletal: Normal range of motion. He exhibits no edema or tenderness.   Neurological: He is alert and oriented to person, place, and time.   Skin: Skin is warm and dry. No rash noted. No erythema. No pallor.   Multiple areas of skin breakdown which do not appear acutely infected.  Dry skin.       Significant Labs: All pertinent labs within the past 24 hours have been reviewed.    Significant Imaging: I  have reviewed all pertinent imaging results/findings within the past 24 hours.

## 2020-02-17 NOTE — PLAN OF CARE
"MADINA received a call from Donnie at [facility]. He states that the patients daughter said "the house is a mess, I have to clean up" and will not meet with them until late this evening or tomorrow morning.        02/17/20 1418   Post-Acute Status   Post-Acute Authorization Home Health/Hospice   Home Health/Hospice Status Family Barriers     "

## 2020-02-17 NOTE — PLAN OF CARE
Problem: Malnutrition  Goal: Improved Nutritional Intake  Outcome: Ongoing, Progressing     Intervention: High kcal/high protein diet and commercial beverage    Recommendations    1. Continue High kcal/high protein diet.   2. Daily Weights.     Goals: 1.>75% of EEN, EPN by RD follow up.   Nutrition Goal Status: goal met

## 2020-02-17 NOTE — PROGRESS NOTES
"[facility]  Adult Nutrition  Progress Note    SUMMARY     Intervention: High kcal/high protein diet and commercial beverage    Recommendations    1. Continue High kcal/high protein diet.   2. Daily Weights.     Goals: 1.>75% of EEN, EPN by RD follow up.   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 Po intake 50% of meals, pt needs assisstance with meals. Pt educated on drinking boost. If 3 boost are consumed/day pt will meet 62% EEN and 68% EPN. Pt has decreased appetite x 3 months. Severe change in weight, -50 lbs x 10 months. -29 % weight loss x 10 months. NFPE 2.- severe depletion of L/E's and clavicle. No edema noted. Severe malnutrition in the context of chronic illness.  20- Pt PO intake 50-75% of meals. Wounds: abrasion 3rd tow and unstageable PI to left hip. +13lbs weight gain since last note.   Nutrition Discharge Planning: Adequate nutrition to meet needs via high kcal/high protein diet.     Nutrition Risk Screen    Nutrition Risk Screen: large or nonhealing wound, burn or pressure injury    Nutrition/Diet History    Patient Reported Diet/Restrictions/Preferences: general  Spiritual, Cultural Beliefs, Episcopal Practices, Values that Affect Care: no    Anthropometrics    Height Method: Measured  Height: 5' 6" (167.6 cm)  Height (inches): 66 in  Weight Method: Bed Scale  Weight: 59 kg (130 lb 1.1 oz)  Weight (lb): 130.07 lb  Ideal Body Weight (IBW), Male: 142 lb  % Ideal Body Weight, Male (lb): 91.6 %  BMI (Calculated): 21  BMI Grade: 18.5-24.9 - normal  Usual Body Weight (UBW), k.4 kg  % " Usual Body Weight: 70.44  % Weight Change From Usual Weight: -29.71 %     Lab/Procedures/Meds    Pertinent Labs Reviewed: reviewed  Pertinent Labs Comments: BUN 5, Ca 8, Phos 2.0, Albumin 2.1  Pertinent Medications Reviewed: reviewed  Pertinent Medications Comments: Folic Acid, Warfain    Estimated/Assessed Needs    Weight Used For Calorie Calculations: 59 kg (130 lb 1.1 oz)  Energy Calorie Requirements (kcal): 1770- 2065 kcals/day(30-35 kcal/kg Pressure Injury)  Energy Need Method: Kcal/kg  Protein Requirements: 73.7- 88.6 g/day(1.25-1.5 g/kg Pressure Injury)  Weight Used For Protein Calculations: 59 kg (130 lb 1.1 oz)  Fluid Requirements (mL): 1mL/kcal or per MD  Estimated Fluid Requirement Method: RDA Method  RDA Method (mL): 1770  CHO Requirement: 198 g CHO     Nutrition Prescription Ordered    Current Diet Order: Regular    Evaluation of Received Nutrient/Fluid Intake    Energy Calories Required: meeting needs  Protein Required: meeting needs  Fluid Required: meeting needs  Comments: LBM 2.16.20  % Intake of Estimated Energy Needs: 50 - 75 %  % Meal Intake: 50 - 75 %    Nutrition Risk    Level of Risk/Frequency of Follow-up: low     Assessment and Plan    Severe malnutrition  Malnutrition in the context of Chronic Illness/Injury     Related to (etiology):  Inadequate energy intake      Signs and Symptoms (as evidenced by):  Energy Intake: <75% of estimated energy requirement x 3 months  Body Fat Depletion: moderate depletion of orbitals, triceps and thoracic   Muscle Mass Depletion: severe depletion of clavicle region, scapular region and lower extremities  Weight Loss: greater than 20% in 1 year ( -29 % weight loss x 10 months)     Interventions/Recommendations (treatment strategy):  Collaboration with other providers  Commercial Beverage     Nutrition Diagnosis Status:  Ongoing     Monitor and Evaluation    Food and Nutrient Intake: food and beverage intake  Food and Nutrient Adminstration: diet  order  Knowledge/Beliefs/Attitudes: food and nutrition knowledge/skill  Physical Activity and Function: nutrition-related ADLs and IADLs  Anthropometric Measurements: weight  Biochemical Data, Medical Tests and Procedures: lipid profile, glucose/endocrine profile  Nutrition-Focused Physical Findings: overall appearance     Malnutrition Assessment    Dani Score: 14  Malnutrition Type: chronic illness  Skin (Micronutrient): cracked, dry   Weight Loss (Malnutrition): greater than 20% in 1 year  Energy Intake (Malnutrition): less than 75% for greater than or equal to 3 months  Subcutaneous Fat (Malnutrition): moderate depletion  Muscle Mass (Malnutrition): severe depletion   Orbital Region (Subcutaneous Fat Loss): moderate depletion  Upper Arm Region (Subcutaneous Fat Loss): moderate depletion  Thoracic and Lumbar Region: moderate depletion   [place] Region (Muscle Loss): moderate depletion  Clavicle Bone Region (Muscle Loss): severe depletion  Clavicle and Acromion Bone Region (Muscle Loss): severe depletion  Scapular Bone Region (Muscle Loss): severe depletion  Dorsal Hand (Muscle Loss): moderate depletion  Patellar Region (Muscle Loss): severe depletion  Anterior Thigh Region (Muscle Loss): severe depletion  Posterior Calf Region (Muscle Loss): severe depletion   Edema (Fluid Accumulation): 0-->no edema present       Nutrition Follow-Up    RD Follow-up?: Yes

## 2020-02-17 NOTE — ASSESSMENT & PLAN NOTE
Case discussed with Dr. Too Holcomb (endocrinology).  Reviewed results of cosyntropin stimulation testing and results are not consistent with adrenal insufficiency.  Dr. Holcomb suspects patient might have falsely low capillary glucose test results.  Recommendation is to monitor off any intravenous dextrose for evidence of recurrent hypoglycemia.  If patient has low capillary blood glucose level, plan to check serum glucose, insulin, c-ceptide, proinsulin, and beta-hydroxybutyrate.

## 2020-02-17 NOTE — ASSESSMENT & PLAN NOTE
Clinically improved with empiric antibiotics and with volume resuscitation.  Blood pressure improved.  Blood cultures no growth.  Urine culture positive for coagulase-negative staph likely a contaminant.  Repeat urinalysis nitrate negative and with only 3 white blood cells.  Discontinued further antibiotic therapy and monitoring for signs/symptoms of infection.  Thus far without signs/symptoms of infection.

## 2020-02-17 NOTE — PLAN OF CARE
Pt's daughter meeting with [facility] today, daughter, Ronal, to notify CM after meeting.  CM to check in with Matthews.   02/17/20 0950   Post-Acute Status   Post-Acute Authorization Home Health/Hospice   Home Health/Hospice Status Pending Payor Review

## 2020-02-18 VITALS
TEMPERATURE: 98 F | DIASTOLIC BLOOD PRESSURE: 62 MMHG | RESPIRATION RATE: 18 BRPM | WEIGHT: 130.06 LBS | SYSTOLIC BLOOD PRESSURE: 103 MMHG | BODY MASS INDEX: 20.9 KG/M2 | OXYGEN SATURATION: 96 % | HEIGHT: 66 IN | HEART RATE: 68 BPM

## 2020-02-18 PROBLEM — Z74.01 BEDBOUND: Status: ACTIVE | Noted: 2020-02-18

## 2020-02-18 PROBLEM — R54 FRAILTY SYNDROME IN GERIATRIC PATIENT: Status: ACTIVE | Noted: 2020-02-18

## 2020-02-18 PROBLEM — I50.22 CHRONIC SYSTOLIC CHF (CONGESTIVE HEART FAILURE): Status: ACTIVE | Noted: 2020-02-18

## 2020-02-18 PROBLEM — E44.0 MALNUTRITION OF MODERATE DEGREE: Status: ACTIVE | Noted: 2020-02-18

## 2020-02-18 LAB
B-OH-BUTYR BLD STRIP-SCNC: 0.1 MMOL/L (ref 0–0.5)
C PEPTIDE SERPL-MCNC: 2.72 NG/ML (ref 0.78–5.19)
GLUCOSE SERPL-MCNC: 78 MG/DL (ref 70–110)
INR PPP: 1.4 (ref 0.8–1.2)
POCT GLUCOSE: 62 MG/DL (ref 70–110)
POCT GLUCOSE: 93 MG/DL (ref 70–110)
PROTHROMBIN TIME: 15.5 SEC (ref 9–12.5)

## 2020-02-18 PROCEDURE — 84681 ASSAY OF C-PEPTIDE: CPT

## 2020-02-18 PROCEDURE — 85610 PROTHROMBIN TIME: CPT

## 2020-02-18 PROCEDURE — 82010 KETONE BODYS QUAN: CPT

## 2020-02-18 PROCEDURE — 82947 ASSAY GLUCOSE BLOOD QUANT: CPT

## 2020-02-18 PROCEDURE — 97110 THERAPEUTIC EXERCISES: CPT | Mod: CQ

## 2020-02-18 PROCEDURE — 99239 PR HOSPITAL DISCHARGE DAY,>30 MIN: ICD-10-PCS | Mod: ,,, | Performed by: HOSPITALIST

## 2020-02-18 PROCEDURE — 25000003 PHARM REV CODE 250: Performed by: HOSPITALIST

## 2020-02-18 PROCEDURE — 36415 COLL VENOUS BLD VENIPUNCTURE: CPT

## 2020-02-18 PROCEDURE — 84206 ASSAY OF PROINSULIN: CPT

## 2020-02-18 PROCEDURE — 25000003 PHARM REV CODE 250: Performed by: NURSE PRACTITIONER

## 2020-02-18 PROCEDURE — 99239 HOSP IP/OBS DSCHRG MGMT >30: CPT | Mod: ,,, | Performed by: HOSPITALIST

## 2020-02-18 RX ADMIN — POTASSIUM & SODIUM PHOSPHATES POWDER PACK 280-160-250 MG 2 PACKET: 280-160-250 PACK at 08:02

## 2020-02-18 RX ADMIN — CYANOCOBALAMIN TAB 1000 MCG 1000 MCG: 1000 TAB at 08:02

## 2020-02-18 RX ADMIN — FOLIC ACID 1 MG: 1 TABLET ORAL at 08:02

## 2020-02-18 RX ADMIN — WARFARIN SODIUM 6 MG: 5 TABLET ORAL at 04:02

## 2020-02-18 RX ADMIN — POTASSIUM & SODIUM PHOSPHATES POWDER PACK 280-160-250 MG 2 PACKET: 280-160-250 PACK at 04:02

## 2020-02-18 RX ADMIN — POTASSIUM & SODIUM PHOSPHATES POWDER PACK 280-160-250 MG 2 PACKET: 280-160-250 PACK at 12:02

## 2020-02-18 NOTE — PLAN OF CARE
[facility]  Department of Hospital Medicine  [address] [place] [address]  [phone] after hours  [phone] fax    HOSPICE  ORDERS    02/18/2020    Admit to Hospice:  Home Service     Diagnoses:   Active Hospital Problems    Diagnosis  POA    *Sepsis due to urinary tract infection [A41.9, N39.0]  Yes    Hypoglycemia [E16.2]  Yes    Wound, open, toe [S91.109A]  Yes    Skin breakdown [L90.9]  Yes    Unstageable pressure ulcer of hip [L89.200]  Yes    Type II diabetes mellitus with complication [E11.8]  Yes    Atrial fibrillation [I48.91]  Yes      Resolved Hospital Problems    Diagnosis Date Resolved POA    Urinary tract infection without hematuria [N39.0] 02/13/2020 Yes    Hypotension [I95.9] 02/13/2020 Yes       Hospice Qualifying Diagnoses:        Patient has a life expectancy < 6 months due to:  1) Primary Hospice Diagnosis: Chronic systolic chf, Bedridden state, frequent falls, malnutrition, severe frailty,   2) Comorbid Conditions Contributing to Decline:  atrial fibrillation, hypertension, hyperlipidemia    Vital Signs: Routine per Hospice Protocol.    Code Status: full code    Allergies: Review of patient's allergies indicates:  No Known Allergies    Diet: cardiac    Activities: As tolerated    Nursing: Per Hospice Routine.    Routine Skin for Bedridden Patients: Apply moisture barrier cream to all skin folds and wet areas in perineal area daily and after baths and all bowel movements.    Wound Care:   Left lateral Hip -Unstageable Pressure Injury - Clean with wound cleanser, pat dry. Apply Medihoney to wound base, cover with a silicone bordered foam dressing. Change daily.    Right 2nd toe: clean toe wound with betadine and apply bordered gauze dressing every other day.    LABS:  Check INR weekly    Oxygen: as needed for comfort.    Medications:    Chris Asencio   Home Medication Instructions ANGELLA:[id number]    Printed on:02/18/20 8622    Medication Information                      blood sugar diagnostic Strp  1 strip by Misc.(Non-Drug; Combo Route) route once daily.             blood-glucose meter kit  Use as instructed             cyanocobalamin (VITAMIN B-12) 1000 MCG tablet  Take 1 tablet (1,000 mcg total) by mouth once daily.             econazole nitrate 1 % cream  AAA bid to feet             folic acid (FOLVITE) 1 MG tablet  Take 1 tablet (1 mg total) by mouth once daily.             HYDROcodone-acetaminophen (NORCO) 7.5-325 mg per tablet  Take 1 tablet by mouth every 6 (six) hours as needed for Pain.             lancets Misc  1 Units by Misc.(Non-Drug; Combo Route) route once daily.             warfarin (COUMADIN) 1 MG tablet  TAKE 1 TABLET BY MOUTH EVERY DAY             warfarin (COUMADIN) 5 MG tablet  take 1 tablet by mouth once daily               DIABETES CARE:    Fingerstick blood sugar a.m. and p.m.  Give orange juice prn blood sugar less than 70.    Future Orders:  Hospice Medical Director may dictate new orders for comfortable care measures & sign death certificate.        _________________________________  Ahsan Petersen MD  02/18/2020

## 2020-02-18 NOTE — ASSESSMENT & PLAN NOTE
-At home he was previously on glipizide but this was discontinued by his pcp  -He had multiple episodes of hypoglycemia, which appear primarily due to diminished appetite  -There was no evidence of adrenal insufficiency or hyperinsulinism.  -Have ordered for his blood sugars to be checked twice daily at home as long as he desires and if sugars are low to give him orange juice.  He is entering into hospice care.

## 2020-02-18 NOTE — ASSESSMENT & PLAN NOTE
-Case discussed by Dr. Stein with Dr. Too Holcomb (endocrinology).  They reviewed results of cosyntropin stimulation testing and results are not consistent with adrenal insufficiency.    -Dr. Holcomb suspects patient might have falsely low capillary glucose test results.    -On day of discharge AM blood sugar was 62 and he had no symptoms.  Serum glucose was 78, C-peptide was normal at 2.72 and beta-hydroxybutyrate was also normal.  -Continue to encourage better oral intake  -Stop all medical treatment of diabetes  -Treat hypoglycemia as above.  -Patient is entering into hospice care at discharge.

## 2020-02-18 NOTE — DISCHARGE SUMMARY
"[facility] Hospital Medicine  Discharge Summary      Patient Name: Chris Asencio  MRN: [id number]  Admission Date: 2/11/2020  Hospital Length of Stay: 6 days  Discharge Date and Time: No discharge date for patient encounter.  Attending Physician: Ahsan Petersen MD   Discharging Provider: Ahsan Petersen MD  Primary Care Provider: Arleen Garcia MD      HPI:   Per Yaya Mckee, NP:    "The patient is a 88-year-old male history of AFib on Coumadin, hypertension, hyperlipidemia, diabetes with bedsore to the left hip who presents via EMS with complaints of multiple episodes of diarrhea and low blood sugar.  Patient's daughter is his primary caregiver and she reports that patient was in his usual state of health but at 5:00 a.m. this morning he started with very loose watery stools.  She reports he has had a bowel movement approximately every 0.5 hr.  There has been no obvious blood but patient does report that there has been unusual smell with stool.  There is no known sick contacts.  Patient has not tolerated any food or fluids today but this is because of poor appetite.  Patient has no vomiting.  There is no cough or cold symptoms or fever.  Patient's daughter reports home health nurse came to evaluate the patient and determined that his blood sugar was low so she gave the patient a Coke to drink.  The patient's primary provider was consulted via phone by the home health nurse and the patient was instructed to come straight to the emergency department via EMS.  EMS blood sugar was noted to be 125 on arrival patient had hypotension with 70 systolic and received 1 L of lactated Ringer's in route.  Patient adamantly denies any abdominal pain, chest pain, shortness of breath, bleeding, headache, dizziness or confusion.  Patient's daughter does cooperate no complaints and normal affect. Daughter is present throughout entire interview and exam."    Consults:   Consults (From admission, onward)    "     Status Ordering Provider     Inpatient consult to Palliative Care  Once     Provider:  Elnaa Tomlinson, RN    Completed YEMI STEIN     Inpatient consult to Podiatry  Once     Provider:  Long Garcia DPM    Completed YEMI STEIN     Inpatient consult to Registered Dietitian/Nutritionist  Once     Provider:  (Not yet assigned)    Completed YEMI STEIN     Inpatient consult to Social Work/Case Management  Once     Provider:  (Not yet assigned)    Completed GIO FLORES     IP consult to case management  Once     Provider:  (Not yet assigned)    Completed GIO FLORES        Hospital Course By Problem:   * Sepsis due to urinary tract infection  -Mr. Asencio was admitted to inpatient status.  -He was diagnosed with sepsis due to UTI.  -Blood cultures were negative.  Urine culture positive for coagulase-negative staph   -He was treated with four days of rocephin and repeat urinalysis nitrate negative and with only 3 white blood cells so antibiotics were discontinued.  -He remained afebrile with normal wbc.  -He is medically cleared for discharge today.  His family has opted for hospice care at home.    Type II diabetes mellitus with complication  -At home he was previously on glipizide but this was discontinued by his pcp  -He had multiple episodes of hypoglycemia, which appear primarily due to diminished appetite  -There was no evidence of adrenal insufficiency or hyperinsulinism.  -Have ordered for his blood sugars to be checked twice daily at home as long as he desires and if sugars are low to give him orange juice.  He is entering into hospice care.    Hypoglycemia  -Case discussed by Dr. Stein with Dr. Too Holcomb (endocrinology).  They reviewed results of cosyntropin stimulation testing and results are not consistent with adrenal insufficiency.    -Dr. Holcomb suspects patient might have falsely low capillary glucose test results.    -On day of discharge AM blood  sugar was 62 and he had no symptoms.  Serum glucose was 78, C-peptide was normal at 2.72 and beta-hydroxybutyrate was also normal.  -Continue to encourage better oral intake  -Stop all medical treatment of diabetes  -Treat hypoglycemia as above.  -Patient is entering into hospice care at discharge.    Bedbound  -Chronic bedbound status with pressure injury, blindness, severe frailty with malnutrition noted  -His life expectancy is six months or less and his overall condition is terminal and irreversible.  -Palliative care team consulted.  Family wishes to continue with full code for now pending further discussion, but they do wish to transition to comfort care with hospice at home.  -Hospital bed and hoya lift delivered today.    Malnutrition of moderate degree  -Continue to encourage oral intake, but focus on comfort.    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    Chronic systolic CHF (congestive heart failure)  -EF noted to be 45%  -Discontinuing medical treatment at this time due to low normal blood pressure  -Recommend cardiac diet at home.    Wound, open, toe  -Wound care and podiatry consulted  -Continue wound care per their recommendations.    Skin breakdown  -Wound care and podiatry consulted  -Continue wound care per their recommendations.    Unstageable pressure ulcer of hip  -Wound care consulted  -Continue wound care per their recommendations.    Atrial fibrillation  -At home was on metoprolol and warfarin  -Metoprolol will not be resumed due to low normal blood pressure  -Continue warfarin as at home.  I discussed at length with his daughter and although he is entering into hospice care she wishes to continue this medication.  I have asked his hospice company to check INR weekly.    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 [R54] 02/18/2020 Yes    Chronic systolic CHF (congestive heart failure) [I50.22] 02/18/2020 Yes    Wound, open, toe [S91.109A] 02/14/2020 Yes    Skin breakdown [L90.9] 02/13/2020 Yes    Unstageable pressure ulcer of hip [L89.200] 02/12/2020 Yes    Atrial fibrillation [I48.91] 04/09/2013 Yes      Problems Resolved During this Admission:    Diagnosis Date Noted Date Resolved POA    Urinary tract infection without hematuria [N39.0] 02/11/2020 02/13/2020 Yes    Hypotension [I95.9] 02/11/2020 02/13/2020 Yes       Discharged Condition: poor    Disposition: Hospice/Home    Follow Up:  Follow-up Information     Hospice providers.    Why:  as needed               Patient Instructions:      Diet Cardiac     Notify your health care provider if you experience any of the following:  increased confusion or weakness     Notify your health care provider if you experience any of the following:  persistent dizziness, light-headedness, or visual disturbances     Notify your health care provider if you experience any of the following:  worsening rash     Notify your health care provider if you experience any of the following:  severe persistent headache     Notify your health care provider if you experience any of the following:  difficulty breathing or increased cough     Notify your health care provider if you experience any of the following:  severe uncontrolled pain     Notify your health care provider if you experience any of the following:  persistent nausea and vomiting or diarrhea     Notify your health care provider if you experience any of the following:  temperature >100.4     Activity as tolerated       Significant Diagnostic Studies: Labs:   BMP:   Recent Labs   Lab 02/18/20  0923   GLU 78   , CMP   Recent Labs    Lab 02/18/20 0923   GLU 78    and CBC No results for input(s): WBC, HGB, HCT, PLT in the last 48 hours.    Pending Diagnostic Studies:     Procedure Component Value Units Date/Time    Proinsulin [[id number]] Collected:  02/18/20 0923    Order Status:  Sent Lab Status:  In process Updated:  02/18/20 1111    Specimen:  Blood          Medications:  Reconciled Home Medications:      Medication List      CONTINUE taking these medications    blood sugar diagnostic Strp  1 strip by Misc.(Non-Drug; Combo Route) route once daily.     blood-glucose meter kit  Use as instructed     cyanocobalamin 1000 MCG tablet  Commonly known as:  VITAMIN B-12  Take 1 tablet (1,000 mcg total) by mouth once daily.     econazole nitrate 1 % cream  AAA bid to feet     folic acid 1 MG tablet  Commonly known as:  FOLVITE  Take 1 tablet (1 mg total) by mouth once daily.     HYDROcodone-acetaminophen 7.5-325 mg per tablet  Commonly known as:  NORCO  Take 1 tablet by mouth every 6 (six) hours as needed for Pain.     lancets Misc  1 Units by Misc.(Non-Drug; Combo Route) route once daily.     * warfarin 5 MG tablet  Commonly known as:  COUMADIN  take 1 tablet by mouth once daily     * warfarin 1 MG tablet  Commonly known as:  COUMADIN  TAKE 1 TABLET BY MOUTH EVERY DAY         * This list has 2 medication(s) that are the same as other medications prescribed for you. Read the directions carefully, and ask your doctor or other care provider to review them with you.            STOP taking these medications    metoprolol tartrate 25 MG tablet  Commonly known as:  LOPRESSOR            Indwelling Lines/Drains at time of discharge:   Lines/Drains/Airways     Drain            Male External Urinary Catheter 02/12/20 0100 6 days          Pressure Ulcer                 Pressure Injury 02/12/20 0100 Left lateral Hip Unstageable 6 days                Time spent on the discharge of patient: 35 minutes  Patient was seen and examined on the date of discharge and  determined to be suitable for discharge.         Ahsan Petersen MD  Department of Hospital Medicine  [facility]

## 2020-02-18 NOTE — PT/OT/SLP PROGRESS
Physical Therapy Treatment    Patient Name:  Chris Asencio   MRN:  [id number]    Recommendations:     Discharge Recommendations:  home   Discharge Equipment Recommendations: other (see comments)(TBD)   Barriers to discharge: None    Assessment:     Chris Asencio is a 88 y.o. male admitted with a medical diagnosis of Sepsis due to urinary tract infection.  He presents with the following impairments/functional limitations:  weakness, impaired self care skills, impaired endurance, impaired functional mobilty, visual deficits, decreased lower extremity function, decreased ROM, impaired skin, impaired balance, impaired coordination ,  Pt presented supine upon arrival of PT. Pt agreeable to PT. Pt sup<> sit w/ Shawna. Pt sat EOB w/ SBA..    Rehab Prognosis: Fair; patient would benefit from acute skilled PT services to address these deficits and reach maximum level of function.    Recent Surgery: * No surgery found *      Plan:     During this hospitalization, patient to be seen 3 x/week to address the identified rehab impairments via therapeutic activities, therapeutic exercises, neuromuscular re-education and progress toward the following goals:    · Plan of Care Expires:  03/12/20    Subjective     Chief Complaint: none stated  Patient/Family Comments/goals: none stated  Pain/Comfort:  · Pain Rating 1: 0/10  · Pain Rating Post-Intervention 1: 0/10      Objective:     Communicated with ns(Katt) prior to session.  Patient found supine with Condom Catheter, peripheral IV upon PT entry to room.     General Precautions: Standard, fall   Orthopedic Precautions:N/A   Braces: N/A     Functional Mobility:  · Bed Mobility:     · Rolling Left:  minimum assistance  · Rolling Right: minimum assistance  · Supine to Sit: minimum assistance  · Sit to Supine: minimum assistance      AM-PAC 6 CLICK MOBILITY  Turning over in bed (including adjusting bedclothes, sheets and blankets)?: 3  Sitting down on and standing up from a chair with  arms (e.g., wheelchair, bedside commode, etc.): 2  Moving from lying on back to sitting on the side of the bed?: 3  Moving to and from a bed to a chair (including a wheelchair)?: 2  Need to walk in hospital room?: 1  Climbing 3-5 steps with a railing?: 1  Basic Mobility Total Score: 12       Therapeutic Activities and Exercises:   Pt performed seated therapeutic exercises including hip flexion, AP and LAQs x 10 reps with verbal and tactile cues.      Patient left supine with all lines intact, call button in reach and ns notified..    GOALS:   Multidisciplinary Problems     Physical Therapy Goals        Problem: Physical Therapy Goal    Goal Priority Disciplines Outcome Goal Variances Interventions   Physical Therapy Goal     PT, PT/OT Ongoing, Progressing     Description:  Goals to be met by: 2020    Patient will increase functional independence with mobility by performin. Sit<>stand/squat with min A with no AD.  2. Supine<>sit with min A.  met 2020  3. Squat pivot EOB <>BSC with mod A                     Time Tracking:     PT Received On: 20  PT Start Time: 1025     PT Stop Time: 1043  PT Total Time (min): 18 min     Billable Minutes: Therapeutic Exercise 18    Treatment Type: Treatment  PT/PTA: PTA     PTA Visit Number: 2     Satinder Rios, IRASEMA  2020

## 2020-02-18 NOTE — NURSING
Patient discharge instructions given (LA post in discharge folder).  IV removed.  Telemetry box returned.  Stretcher transport here to bring patient home.  Patient denies needs at this time.

## 2020-02-18 NOTE — PLAN OF CARE
See comment     02/18/20 6617   Medicare Message   Important Message from Medicare regarding Discharge Appeal Rights   (Pt blind, unable to sign,  daughter not available, but gave permission to sign)   Date IMM was signed 02/17/20   Time IMM was signed 7050

## 2020-02-18 NOTE — ASSESSMENT & PLAN NOTE
-Chronic bedbound status with pressure injury, blindness, severe frailty with malnutrition noted  -His life expectancy is six months or less and his overall condition is terminal and irreversible.  -Palliative care team consulted.  Family wishes to continue with full code for now pending further discussion, but they do wish to transition to comfort care with hospice at home.  -Hospital bed and hoya lift delivered today.

## 2020-02-18 NOTE — ASSESSMENT & PLAN NOTE
-EF noted to be 45%  -Discontinuing medical treatment at this time due to low normal blood pressure  -Recommend cardiac diet at home.

## 2020-02-18 NOTE — PLAN OF CARE
Family unable to meet with [facility] until this morning. Daughter, Ronal [phone], is home with pt 24/7, and has decided on home hospice.    CM to follow for plans and arrangements. Currently pt will need jam, bedside table and an overlay for his hospital bed. Del to determine additional needs.

## 2020-02-18 NOTE — PLAN OF CARE
Problem: Physical Therapy Goal  Goal: Physical Therapy Goal  Description  Goals to be met by: 2020    Patient will increase functional independence with mobility by performin. Sit<>stand/squat with min A with no AD.  2. Supine<>sit with min A.  met 2020  3. Squat pivot EOB <>BSC with mod A    Outcome: Ongoing, Progressing   Pt presented supine upon arrival of PT. Pt agreeable to PT. Pt sup<> sit w/ Shawna. Pt sat EOB w/ SBA.

## 2020-02-18 NOTE — PLAN OF CARE
Pt discharging today with [facility] hospice. Transportation to be arranged thru [employer] for wheelchair van.    Daughter and pt informed and are in agreement with plan.    E[place]rett informed of discharge today,    No additional CM needs or barriers for discharge.     02/18/20 1503   Final Note   Assessment Type Final Discharge Note   Anticipated Discharge Disposition HospiceHome   What phone number can be called within the next 1-3 days to see how you are doing after discharge? [phone]   Hospital Follow Up  Appt(s) scheduled? Yes   Discharge plans and expectations educations in teach back method with documentation complete? Yes   Right Care Referral Info   Post Acute Recommendation Other  (home hospice)

## 2020-02-18 NOTE — PLAN OF CARE
Pt in bed, no noted acute distress, no complaints of pain, feeding assistance provided  condom cath in place and draining, BG monitored as ordered, no injuries or falls during shift, AAO x3, pt appeared to sleep between nursing care, bed in low locked position, call light in reach, hourly rounds complete, will continue to assess

## 2020-02-18 NOTE — ASSESSMENT & PLAN NOTE
-At home was on metoprolol and warfarin  -Metoprolol will not be resumed due to low normal blood pressure  -Continue warfarin as at home.  I discussed at length with his daughter and although he is entering into hospice care she wishes to continue this medication.  I have asked his hospice company to check INR weekly.

## 2020-02-18 NOTE — ASSESSMENT & PLAN NOTE
-Mr. Asencio was admitted to inpatient status.  -He was diagnosed with sepsis due to UTI.  -Blood cultures were negative.  Urine culture positive for coagulase-negative staph   -He was treated with four days of rocephin and repeat urinalysis nitrate negative and with only 3 white blood cells so antibiotics were discontinued.  -He remained afebrile with normal wbc.  -He is medically cleared for discharge today.  His family has opted for hospice care at home.

## 2020-02-19 ENCOUNTER — TELEPHONE (OUTPATIENT)
Dept: INTERNAL MEDICINE | Facility: CLINIC | Age: 85
End: 2020-02-19

## 2020-02-19 NOTE — TELEPHONE ENCOUNTER
----- Message from Divina Gr sent at 2/19/2020 12:44 PM CST -----  Contact: [phone]  Patient's daughter would like to speak to the nurse in regards to a personal matter. Please advise.

## 2020-02-19 NOTE — TELEPHONE ENCOUNTER
Ronal advised that the patient has been placed on Hospice now with [facility] she gave a # [phone]) as they need an order from PCP to come and get the equipment       Called [employer] and they advised the bed can not be resent to there office she states the insurance company may have covered the bed and if they did the equipment belongs to the family termed the call     Called and spoke with Ronal and advised of the information from [employer] she had no further questions and or concerns and termed the call

## 2020-02-24 DIAGNOSIS — E44.0 MALNUTRITION OF MODERATE DEGREE: Primary | ICD-10-CM

## 2020-02-24 RX ORDER — FOLIC ACID 1 MG/1
TABLET ORAL
Qty: 30 TABLET | Refills: 3 | Status: SHIPPED | OUTPATIENT
Start: 2020-02-24

## 2020-02-24 RX ORDER — FOLIC ACID 1 MG/1
1 TABLET ORAL DAILY
Qty: 90 TABLET | Refills: 1 | Status: SHIPPED | OUTPATIENT
Start: 2020-02-24 | End: 2021-02-23

## 2020-02-25 LAB — PROINSULIN SERPL-SCNC: 14 PMOL/L (ref 3.6–22)

## 2020-02-26 ENCOUNTER — TELEPHONE (OUTPATIENT)
Dept: HOME HEALTH SERVICES | Facility: HOSPITAL | Age: 85
End: 2020-02-26

## 2020-03-05 ENCOUNTER — EXTERNAL HOME HEALTH (OUTPATIENT)
Dept: HOME HEALTH SERVICES | Facility: HOSPITAL | Age: 85
End: 2020-03-05
Payer: MEDICARE

## 2021-01-15 NOTE — ASSESSMENT & PLAN NOTE
- Mr. Chris Asencio is admitted to inpatient status  - he has left lingular and lower lobe pneumonia  - status post Rocephin, azithromycin in the ED, continue  - initial lactic acid not elevated, monitor recheck  - procalcitonin pending  - pneumonia studies ordered  - p.r.n. medications for cough ordered  - port score: 88    
- anticoagulated on 6 mg q.d. of Coumadin q.d.  - INR pending  - monitor on tele  - continue metoprolol for rate control    
- influenza A positive  - droplet precautions ordered  - initial doses Tamiflu administered in ED  - renally dose Tamiflu ordered    
- last A1C:   Lab Results   Component Value Date    HGBA1C 4.9 07/29/2019    - A1C pending for AM   - hold oral antidiabetic meds   - Diabetic diet   - SSI with accuchekcs AC/HS     
-hypotensive at present, with preserved map  - starting NS infusion  - monitor  - hold oral blood pressure medications     
no concerns

## 2022-10-28 NOTE — ASSESSMENT & PLAN NOTE
Call to pt-advised of dr waldrop's recommendations noted below. Instructed to continue monitoring BPs and call us w readings in one wk. Advised if any urgent questions over wkend, call and speak w our ofc provider on call. Instructed how to reach on call provider. Patient voices understanding/agrees with plan/no further questions and confirms pharmacy. Med order placed. U/A positive for nitrites, leukocytes. Afebrile, no elevation in WBC, lactic 1    Rocephin in ER  Blood cultures pending  Urine culture pending

## 2023-04-10 NOTE — PROGRESS NOTES
1/31/18 chart reviewed In epic  Called to pt and his niece Ronal answered.  States she is not testing blood sugars because she does not have a monitor.  States someone was suppose to send one out but she has not received.  She states the [facility] nurse checks it when she come.  avg that she remember is 90 to 180.    States his appetite is improving and he is now able to ambulate to the bathroom on his own.  Advised on fall safety- making sure paths are clear and she states they are.  Denies any falls  Wounds on legs are improving and no swelling to legs          Plan:   Continue education on diabetic ulcers and safety precautions  Pt needs glucometer     In an effort to ensure that our patients [employer], a Team Member has reviewed your chart and identified an opportunity to provide the best care possible. An attempt was made to discuss or schedule overdue Preventive or Disease Management screening.     The Outcome was Contact was not made, no answer/busy If you have any questions or need help with scheduling, contact our Health Outreach Team at [phone]. Care Gaps include Colorectal Cancer Screening and Immunizations.

## 2024-05-14 NOTE — PLAN OF CARE
CM called pt's daughter, Ronal, [phone], to inquire about home hospice choices. Dtr states she will look at list this morning and let me know. CM stressed to dtr that time was important to arrange visits.     1500   Pt's daughter selected [facility] for home hospice. REBECCA sent referral thru [facility].   cough

## 2025-01-08 NOTE — ED NOTES
Appearance: Pt awake, alert & oriented to person, place & time. Pt in no acute distress at present time. Pt is clean and well groomed with clothes appropriately fastened.   Skin: Skin warm. Lower extremities dry with cracked skin. Color consistent with ethnicity. Mucous membranes dry. Pt with wound to L buttock/hip region. Wound is flat, irregular shaped with light green center with surrounding pink color.   Musculoskeletal: Patient moving all extremities well, no obvious swelling or deformities noted.   Respiratory: Respirations spontaneous, even, and non-labored. Visible chest rise noted. Airway is open and patent. No accessory muscle use noted.   Neurologic: Sensation is intact. Speech is clear and appropriate. Eyes open spontaneously, behavior appropriate to situation, follows commands, purposeful motor response noted.   Cardiac: All peripheral pulses present. No Bilateral lower extremity edema. Cap refill is <3 seconds. Pt denies active chest pains, SOB, dizziness, blurred vision, weakness or fatigue at this time.   Abdomen: Abdomen soft, non-tender to palpation. Pt denies active abd pains, cramping or discomfort, No nausea or vomiting. Stool is yellow brown, soft.          
Daughter, Ronal, at BS speaking with ERIK Schuster PA-C.   
Pt cleansed with warm wipes, pt with large amount of stool noted to backside, pt cleansed and dried.   
Pt to ED via EMS for diarrhea x onset today with several episodes, denies ABD pain. Pt is awake, alert, oriented to person, place, acting appropriately. Respirations even and unlabored. No acute distress noted. Awaiting further orders. Pt updated on POC. Bed is locked and in lowest position with side rails up x2. Call bell within reach and pt oriented to use of call bell. Pt continuous pulse ox, and continuous BP cuff. Will continue to monitor. Daughter at BS.     
mepilex applied to ulcer on L hip  
Patent